# Patient Record
Sex: FEMALE | Race: WHITE | NOT HISPANIC OR LATINO | Employment: OTHER | URBAN - METROPOLITAN AREA
[De-identification: names, ages, dates, MRNs, and addresses within clinical notes are randomized per-mention and may not be internally consistent; named-entity substitution may affect disease eponyms.]

---

## 2017-03-13 ENCOUNTER — OFFICE VISIT (OUTPATIENT)
Dept: FAMILY MEDICINE | Facility: CLINIC | Age: 47
End: 2017-03-13
Payer: COMMERCIAL

## 2017-03-13 VITALS
SYSTOLIC BLOOD PRESSURE: 114 MMHG | HEIGHT: 66 IN | DIASTOLIC BLOOD PRESSURE: 68 MMHG | WEIGHT: 192.5 LBS | HEART RATE: 77 BPM | RESPIRATION RATE: 20 BRPM | BODY MASS INDEX: 30.94 KG/M2 | OXYGEN SATURATION: 98 % | TEMPERATURE: 98.5 F

## 2017-03-13 DIAGNOSIS — R10.13 ABDOMINAL PAIN, EPIGASTRIC: Primary | ICD-10-CM

## 2017-03-13 DIAGNOSIS — R14.2 FLATULENCE, ERUCTATION, AND GAS PAIN: ICD-10-CM

## 2017-03-13 DIAGNOSIS — R14.1 FLATULENCE, ERUCTATION, AND GAS PAIN: ICD-10-CM

## 2017-03-13 DIAGNOSIS — R14.3 FLATULENCE, ERUCTATION, AND GAS PAIN: ICD-10-CM

## 2017-03-13 LAB
BASOPHILS # BLD AUTO: 0 10E9/L (ref 0–0.2)
BASOPHILS NFR BLD AUTO: 0.3 %
DIFFERENTIAL METHOD BLD: NORMAL
EOSINOPHIL # BLD AUTO: 0.2 10E9/L (ref 0–0.7)
EOSINOPHIL NFR BLD AUTO: 2.3 %
ERYTHROCYTE [DISTWIDTH] IN BLOOD BY AUTOMATED COUNT: 12.8 % (ref 10–15)
HCT VFR BLD AUTO: 39.6 % (ref 35–47)
HGB BLD-MCNC: 13 G/DL (ref 11.7–15.7)
LYMPHOCYTES # BLD AUTO: 2.2 10E9/L (ref 0.8–5.3)
LYMPHOCYTES NFR BLD AUTO: 24.2 %
MCH RBC QN AUTO: 30.1 PG (ref 26.5–33)
MCHC RBC AUTO-ENTMCNC: 32.8 G/DL (ref 31.5–36.5)
MCV RBC AUTO: 92 FL (ref 78–100)
MONOCYTES # BLD AUTO: 0.9 10E9/L (ref 0–1.3)
MONOCYTES NFR BLD AUTO: 9.8 %
NEUTROPHILS # BLD AUTO: 5.7 10E9/L (ref 1.6–8.3)
NEUTROPHILS NFR BLD AUTO: 63.4 %
PLATELET # BLD AUTO: 270 10E9/L (ref 150–450)
RBC # BLD AUTO: 4.32 10E12/L (ref 3.8–5.2)
WBC # BLD AUTO: 9 10E9/L (ref 4–11)

## 2017-03-13 PROCEDURE — 85025 COMPLETE CBC W/AUTO DIFF WBC: CPT | Performed by: PHYSICIAN ASSISTANT

## 2017-03-13 PROCEDURE — 36415 COLL VENOUS BLD VENIPUNCTURE: CPT | Performed by: PHYSICIAN ASSISTANT

## 2017-03-13 PROCEDURE — 99213 OFFICE O/P EST LOW 20 MIN: CPT | Performed by: PHYSICIAN ASSISTANT

## 2017-03-13 PROCEDURE — 80053 COMPREHEN METABOLIC PANEL: CPT | Performed by: PHYSICIAN ASSISTANT

## 2017-03-13 RX ORDER — OMEPRAZOLE 40 MG/1
40 CAPSULE, DELAYED RELEASE ORAL DAILY
Qty: 30 CAPSULE | Refills: 1 | Status: SHIPPED | OUTPATIENT
Start: 2017-03-13 | End: 2017-11-08

## 2017-03-13 ASSESSMENT — ANXIETY QUESTIONNAIRES
7. FEELING AFRAID AS IF SOMETHING AWFUL MIGHT HAPPEN: SEVERAL DAYS
2. NOT BEING ABLE TO STOP OR CONTROL WORRYING: NEARLY EVERY DAY
3. WORRYING TOO MUCH ABOUT DIFFERENT THINGS: NEARLY EVERY DAY
GAD7 TOTAL SCORE: 8
6. BECOMING EASILY ANNOYED OR IRRITABLE: SEVERAL DAYS
5. BEING SO RESTLESS THAT IT IS HARD TO SIT STILL: NOT AT ALL
1. FEELING NERVOUS, ANXIOUS, OR ON EDGE: NOT AT ALL
IF YOU CHECKED OFF ANY PROBLEMS ON THIS QUESTIONNAIRE, HOW DIFFICULT HAVE THESE PROBLEMS MADE IT FOR YOU TO DO YOUR WORK, TAKE CARE OF THINGS AT HOME, OR GET ALONG WITH OTHER PEOPLE: SOMEWHAT DIFFICULT

## 2017-03-13 ASSESSMENT — PATIENT HEALTH QUESTIONNAIRE - PHQ9: 5. POOR APPETITE OR OVEREATING: NOT AT ALL

## 2017-03-13 NOTE — PATIENT INSTRUCTIONS
The 'BRAT' diet is suggested: Bread, rice, applesauce, toast, ie, bland carbohydrates.  Avoid high protein, high fat (ie, meat, cheese, dairy products) fried foods and spicy foods until you are able to tolerate carbohydrates.  Frequent, small amounts; don't overdo, go slowly! Progress diet as tolerated as symptoms alyssa.   Drink plenty of fluids!!!  Over the counter cold remedies that have zinc and Vitamin C may help you feel better sooner.    Recommend trial of a daily probiotic agent; some common options include: Align, Culturelle, Digestive Advantage, Florastor, Activia yogurt, or Kefir.  Choose one agent (or a comparable generic OTC formulation) that is affordable/palatable and use it daily for at least 3-6 months to determine its baseline effect.  Encouraged 20 billion units per day for probiotic dosage (5-10 billion units for children).  May use over the counter peptobismal if needed, don't be alarmed if it turns your stool black.    Trial of zantac (ranitidine), Prilosec (omeprazole), Nexium (esomeprazole) or other acid reflex type medicines encouraged as well.    Call if bloody stools, persistent diarrhea, vomiting, fever or abdominal pain.    Consider Gastroenterology referral for possible EGD (endoscopy) if no improvement with above or any worsening/changes in symptoms.

## 2017-03-13 NOTE — MR AVS SNAPSHOT
After Visit Summary   3/13/2017    Abi Mcnair    MRN: [id number]           Patient Information     Date Of Birth          1970        Visit Information        Provider Department      3/13/2017 2:40 PM Ree Guerrero PA-C [facility]        Today's Diagnoses     Abdominal pain, epigastric    -  1    Flatulence, eructation, and gas pain          Care Instructions    The 'BRAT' diet is suggested: Bread, rice, applesauce, toast, ie, bland carbohydrates.  Avoid high protein, high fat (ie, meat, cheese, dairy products) fried foods and spicy foods until you are able to tolerate carbohydrates.  Frequent, small amounts; don't overdo, go slowly! Progress diet as tolerated as symptoms alyssa.   Drink plenty of fluids!!!  Over the counter cold remedies that have zinc and Vitamin C may help you feel better sooner.    Recommend trial of a daily probiotic agent; some common options include: Align, Culturelle, Digestive Advantage, Florastor, Activia yogurt, or Kefir.  Choose one agent (or a comparable generic OTC formulation) that is affordable/palatable and use it daily for at least 3-6 months to determine its baseline effect.  Encouraged 20 billion units per day for probiotic dosage (5-10 billion units for children).  May use over the counter peptobismal if needed, don't be alarmed if it turns your stool black.    Trial of zantac (ranitidine), Prilosec (omeprazole), Nexium (esomeprazole) or other acid reflex type medicines encouraged as well.    Call if bloody stools, persistent diarrhea, vomiting, fever or abdominal pain.    Consider Gastroenterology referral for possible EGD (endoscopy) if no improvement with above or any worsening/changes in symptoms.          Follow-ups after your visit        Who to contact     If you have questions or need follow up information about today's clinic visit or your schedule please contact [facility] directly at  "[phone].  Normal or non-critical lab and imaging results will be communicated to you by MyChart, letter or phone within 4 business days after the clinic has received the results. If you do not hear from us within 7 days, please contact the clinic through [facility] or phone. If you have a critical or abnormal lab result, we will notify you by phone as soon as possible.  Submit refill requests through [facility] or call your pharmacy and they will forward the refill request to us. Please allow 3 business days for your refill to be completed.          Additional Information About Your Visit        [facility] Information     [facility] lets you send messages to your doctor, view your test results, renew your prescriptions, schedule appointments and more. To sign up, go to [email or web address] . Click on \"Log in\" on the left side of the screen, which will take you to the Welcome page. Then click on \"Sign up Now\" on the right side of the page.     You will be asked to enter the access code listed below, as well as some personal information. Please follow the directions to create your username and password.     Your access code is: [email or web address]  Expires: 2017  2:55 PM     Your access code will  in 90 days. If you need help or a new code, please call your [facility] clinic or [phone].        Care EveryWhere ID     This is your Care EveryWhere ID. This could be used by other organizations to access your [facility] medical records  [id number]        Your Vitals Were     Pulse Temperature Respirations Height Pulse Oximetry BMI (Body Mass Index)    77 98.5  F (36.9  C) (Oral) 20 5' 5.5\" (1.664 m) 98% 31.55 kg/m2       Blood Pressure from Last 3 Encounters:   17 114/68   10/26/15 120/70   03/01/15 121/82    Weight from Last 3 Encounters:   17 192 lb 8 oz (87.3 kg)   10/26/15 194 lb (88 kg)   03/01/15 185 lb (83.9 kg)              We Performed the Following     CBC with platelets differential     " Comprehensive metabolic panel     DEPRESSION ACTION PLAN (DAP)          Today's Medication Changes          These changes are accurate as of: 3/13/17  2:56 PM.  If you have any questions, ask your nurse or doctor.               Start taking these medicines.        Dose/Directions    omeprazole 40 MG capsule   Commonly known as:  priLOSEC   Used for:  Abdominal pain, epigastric, Flatulence, eructation, and gas pain   Started by:  Ree Guerrero PA-C        Dose:  40 mg   Take 1 capsule (40 mg) by mouth daily Take 30-60 minutes before a meal.   Quantity:  30 capsule   Refills:  1            Where to get your medicines      These medications were sent to [facility] [address] [place] [address] AT [address] [address]    Hours:  24-hours Phone:  [phone]     omeprazole 40 MG capsule                Primary Care Provider Office Phone # Fax #    Ree Guerrero PA-C [phone]       [address] [facility] [address]        Thank you!     Thank you for choosing [facility]  for your care. Our goal is always to provide you with excellent care. Hearing back from our patients is one way we can continue to improve our services. Please take a few minutes to complete the written survey that you may receive in the mail after your visit with us. Thank you!             Your Updated Medication List - Protect others around you: Learn how to safely use, store and throw away your medicines at www.disposemymeds.org.          This list is accurate as of: 3/13/17  2:56 PM.  Always use your most recent med list.                   Brand Name Dispense Instructions for use    ABILIFY PO      Reported on 3/13/2017       clonazePAM 1 MG tablet    klonoPIN    30 tablet    Take 0.5-1 tablets by mouth daily as needed for anxiety. Take only for severe panic attacks       omeprazole 40 MG capsule     priLOSEC    30 capsule    Take 1 capsule (40 mg) by mouth daily Take 30-60 minutes before a meal.       TRAZODONE HCL PO      Reported on 3/13/2017       venlafaxine 225 MG Tb24 24 hr tablet    EFFEXOR-ER     Reported on 3/13/2017

## 2017-03-13 NOTE — NURSING NOTE
"Chief Complaint   Patient presents with     Abdominal Pain       Initial /68 (BP Location: Left arm, Patient Position: Chair, Cuff Size: Adult Regular)  Pulse 77  Temp 98.5  F (36.9  C) (Oral)  Resp 20  Ht 5' 5.5\" (1.664 m)  Wt 192 lb 8 oz (87.3 kg)  SpO2 98%  BMI 31.55 kg/m2 Estimated body mass index is 31.55 kg/(m^2) as calculated from the following:    Height as of this encounter: 5' 5.5\" (1.664 m).    Weight as of this encounter: 192 lb 8 oz (87.3 kg).  Medication Reconciliation: complete     Mook Boss CMA  "

## 2017-03-13 NOTE — PROGRESS NOTES
"  SUBJECTIVE:                                                    Abi Mcnair is a 47 year old female who presents to clinic today for the following health issues:      Abdominal Pain      Duration: more noticable this past week     Description (location/character/radiation): top middle abdominal       Associated flank pain: None    Intensity:  moderate    Accompanying signs and symptoms:        Fever/Chills: no        Gas/Bloating: YES- bloating and burping feeling       Nausea/vomitting: YES- nausea       Diarrhea: no        Dysuria or Hematuria: no     History (previous similar pain/trauma/previous testing): none    Precipitating or alleviating factors:       Pain worse with eating/BM/urination: no       Pain relieved by BM: no     Therapies tried and outcome: antacid pill-helps a lilttle    LMP:  2 months ago      Felt like had a stomach flu with vomiting 2 months ago. Occurred again last month as well.  Now a few weeks ago, felt more full and gasey with stomach \"churning\" at night especially. Nausea noted but no vomiting.  Symptoms occurred a couple times a day for a few days.  Decreased appetite and gets full quickly.  Some tenderness and fullness in epigastric area.  No actually pain, but occasionally achey/crampy.    Regular bowel movements, daily, no diarrhea. NO blood in stools.  NO changes in diet, overall healthy.      Problem list and histories reviewed & adjusted, as indicated.  Additional history: as documented    Patient Active Problem List   Diagnosis     Insomnia     CARDIOVASCULAR SCREENING; LDL GOAL LESS THAN 160     Menorrhagia     Anxiety     Depression with anxiety     Past Surgical History   Procedure Laterality Date     Hc excis primary ganglion wrist       right ganglionic cyst removal-wrist       Social History   Substance Use Topics     Smoking status: Never Smoker     Smokeless tobacco: Never Used      Comment: tried it when she was younger     Alcohol use Yes      Comment: 0-5 " "per month     Family History   Problem Relation Age of Onset     Circulatory Mother      varicose veins     OSTEOPOROSIS Mother      osteopenia     Arthritis Father      Lipids Father      Depression Maternal Grandfather      Alcohol/Drug Paternal Grandfather      alcoholism     C.A.D. Other      paternal cousin, heart attack     DIABETES Paternal Uncle      Hypertension Paternal Uncle      Cancer - colorectal Paternal Aunt      Allergies Other      seasonal allergies, benadryl, mold, dust, self ,improves as she gets older     Lipids Sister      Neurologic Disorder Sister      seizures     Respiratory Maternal Uncle      chf, was on oxygen     Thyroid Disease Other      paternal cousin, thyroid cancer     CANCER No family hx of      skin cancer         Current Outpatient Prescriptions   Medication Sig Dispense Refill     omeprazole (PRILOSEC) 40 MG capsule Take 1 capsule (40 mg) by mouth daily Take 30-60 minutes before a meal. 30 capsule 1     Allergies   Allergen Reactions     Diphenhydramine Hcl      Throat started to close up       Reviewed and updated as needed this visit by clinical staff  Tobacco  Allergies  Med Hx  Surg Hx  Fam Hx  Soc Hx      Reviewed and updated as needed this visit by Provider         ROS:  Constitutional, HEENT, cardiovascular, pulmonary, gi and gu systems are negative, except as otherwise noted.    OBJECTIVE:                                                    /68 (BP Location: Left arm, Patient Position: Chair, Cuff Size: Adult Regular)  Pulse 77  Temp 98.5  F (36.9  C) (Oral)  Resp 20  Ht 5' 5.5\" (1.664 m)  Wt 192 lb 8 oz (87.3 kg)  SpO2 98%  BMI 31.55 kg/m2  Body mass index is 31.55 kg/(m^2).  GENERAL: healthy, alert and no distress  RESP: lungs clear to auscultation - no rales, rhonchi or wheezes  CV: regular rate and rhythm, normal S1 S2, no S3 or S4, no murmur, click or rub, no peripheral edema and peripheral pulses strong  ABDOMEN: soft, nontender, no hepatosplenomegaly, " no masses and bowel sounds normal  PSYCH: mentation appears normal, affect normal/bright    Diagnostic Test Results:  Pending at time of note     ASSESSMENT/PLAN:                                                        ICD-10-CM    1. Abdominal pain, epigastric R10.13 Comprehensive metabolic panel     CBC with platelets differential     omeprazole (PRILOSEC) 40 MG capsule   2. Flatulence, eructation, and gas pain R14.3 Comprehensive metabolic panel    R14.1 CBC with platelets differential    R14.2 omeprazole (PRILOSEC) 40 MG capsule       Patient Instructions   The 'BRAT' diet is suggested: Bread, rice, applesauce, toast, ie, bland carbohydrates.  Avoid high protein, high fat (ie, meat, cheese, dairy products) fried foods and spicy foods until you are able to tolerate carbohydrates.  Frequent, small amounts; don't overdo, go slowly! Progress diet as tolerated as symptoms alyssa.   Drink plenty of fluids!!!  Over the counter cold remedies that have zinc and Vitamin C may help you feel better sooner.    Recommend trial of a daily probiotic agent; some common options include: Align, Culturelle, Digestive Advantage, Florastor, Activia yogurt, or Kefir.  Choose one agent (or a comparable generic OTC formulation) that is affordable/palatable and use it daily for at least 3-6 months to determine its baseline effect.  Encouraged 20 billion units per day for probiotic dosage (5-10 billion units for children).  May use over the counter peptobismal if needed, don't be alarmed if it turns your stool black.    Trial of zantac (ranitidine), Prilosec (omeprazole), Nexium (esomeprazole) or other acid reflex type medicines encouraged as well.    Call if bloody stools, persistent diarrhea, vomiting, fever or abdominal pain.    Consider Gastroenterology referral for possible EGD (endoscopy) if no improvement with above or any worsening/changes in symptoms.    Ree Guerrero PA-C  [facility]

## 2017-03-13 NOTE — LETTER
My Depression Action Plan  Name: Abi Mcnair   Date of Birth 1970  Date: 3/13/2017    My doctor: Ree Guerrero   My clinic: [address] [address] [phone]          GREEN    ZONE   Good Control    What it looks like:     Things are going generally well. You have normal up s and down s. You may even feel depressed from time to time, but bad moods usually last less than a day.   What you need to do:  1. Continue to care for yourself (see self care plan)  2. Check your depression survival kit and update it as needed  3. Follow your physician s recommendations including any medication.  4. Do not stop taking medication unless you consult with your physician first.           YELLOW         ZONE Getting Worse    What it looks like:     Depression is starting to interfere with your life.     It may be hard to get out of bed; you may be starting to isolate yourself from others.    Symptoms of depression are starting to last most all day and this has happened for several days.     You may have suicidal thoughts but they are not constant.   What you need to do:     1. Call your care team, your response to treatment will improve if you keep your care team informed of your progress. Yellow periods are signs an adjustment may need to be made.     2. Continue your self-care, even if you have to fake it!    3. Talk to someone in your support network    4. Open up your depression survival kit           RED    ZONE Medical Alert - Get Help    What it looks like:     Depression is seriously interfering with your life.     You may experience these or other symptoms: You can t get out of bed most days, can t work or engage in other necessary activities, you have trouble taking care of basic hygiene, or basic responsibilities, thoughts of suicide or death that will not go away, self-injurious behavior.     What you need to do:  1. Call your  care team and request a same-day appointment. If they are not available (weekends or after hours) call your local crisis line, emergency room or 911.      Electronically signed by: Mook Boss, March 13, 2017    Depression Self Care Plan / Survival Kit    Self-Care for Depression  Here s the deal. Your body and mind are really not as separate as most people think.  What you do and think affects how you feel and how you feel influences what you do and think. This means if you do things that people who feel good do, it will help you feel better.  Sometimes this is all it takes.  There is also a place for medication and therapy depending on how severe your depression is, so be sure to consult with your medical provider and/ or Behavioral Health Consultant if your symptoms are worsening or not improving.     In order to better manage my stress, I will:    Exercise  Get some form of exercise, every day. This will help reduce pain and release endorphins, the  feel good  chemicals in your brain. This is almost as good as taking antidepressants!  This is not the same as joining a gym and then never going! (they count on that by the way ) It can be as simple as just going for a walk or doing some gardening, anything that will get you moving.      Hygiene   Maintain good hygiene (Get out of bed in the morning, Make your bed, Brush your teeth, Take a shower, and Get dressed like you were going to work, even if you are unemployed).  If your clothes don't fit try to get ones that do.    Diet  I will strive to eat foods that are good for me, drink plenty of water, and avoid excessive sugar, caffeine, alcohol, and other mood-altering substances.  Some foods that are helpful in depression are: complex carbohydrates, B vitamins, flaxseed, fish or fish oil, fresh fruits and vegetables.    Psychotherapy  I agree to participate in Individual Therapy (if recommended).    Medication  If prescribed medications, I agree to take them.   Missing doses can result in serious side effects.  I understand that drinking alcohol, or other illicit drug use, may cause potential side effects.  I will not stop my medication abruptly without first discussing it with my provider.    Staying Connected With Others  I will stay in touch with my friends, family members, and my primary care provider/team.    Use your imagination  Be creative.  We all have a creative side; it doesn t matter if it s oil painting, sand castles, or mud pies! This will also kick up the endorphins.    Witness Beauty  (AKA stop and smell the roses) Take a look outside, even in mid-winter. Notice colors, textures. Watch the squirrels and birds.     Service to others  Be of service to others.  There is always someone else in need.  By helping others we can  get out of ourselves  and remember the really important things.  This also provides opportunities for practicing all the other parts of the program.    Humor  Laugh and be silly!  Adjust your TV habits for less news and crime-drama and more comedy.    Control your stress  Try breathing deep, massage therapy, biofeedback, and meditation. Find time to relax each day.     My support system    Clinic Contact:  Phone number:    Contact 1:  Phone number:    Contact 2:  Phone number:    Anabaptism/:  Phone number:    Therapist:  Phone number:    Local crisis center:    Phone number:    Other community support:  Phone number:

## 2017-03-14 LAB
ALBUMIN SERPL-MCNC: 3.8 G/DL (ref 3.4–5)
ALP SERPL-CCNC: 109 U/L (ref 40–150)
ALT SERPL W P-5'-P-CCNC: 67 U/L (ref 0–50)
ANION GAP SERPL CALCULATED.3IONS-SCNC: 9 MMOL/L (ref 3–14)
AST SERPL W P-5'-P-CCNC: ABNORMAL U/L (ref 0–45)
BILIRUB SERPL-MCNC: 0.5 MG/DL (ref 0.2–1.3)
BUN SERPL-MCNC: 17 MG/DL (ref 7–30)
CALCIUM SERPL-MCNC: 9 MG/DL (ref 8.5–10.1)
CHLORIDE SERPL-SCNC: 107 MMOL/L (ref 94–109)
CO2 SERPL-SCNC: 21 MMOL/L (ref 20–32)
CREAT SERPL-MCNC: 0.72 MG/DL (ref 0.52–1.04)
GFR SERPL CREATININE-BSD FRML MDRD: 86 ML/MIN/1.7M2
GLUCOSE SERPL-MCNC: 95 MG/DL (ref 70–99)
POTASSIUM SERPL-SCNC: ABNORMAL MMOL/L (ref 3.4–5.3)
PROT SERPL-MCNC: 7.8 G/DL (ref 6.8–8.8)
SODIUM SERPL-SCNC: 137 MMOL/L (ref 133–144)

## 2017-03-14 ASSESSMENT — PATIENT HEALTH QUESTIONNAIRE - PHQ9: SUM OF ALL RESPONSES TO PHQ QUESTIONS 1-9: 0

## 2017-03-14 ASSESSMENT — ANXIETY QUESTIONNAIRES: GAD7 TOTAL SCORE: 8

## 2017-03-14 NOTE — PROGRESS NOTES
"Please send patient letter with the following:  This letter is sent to inform you of recent test results and to provide you with personal records of health information.    Your labs are overall within normal limits.  There is still an elevated liver enzyme (ALT).  This can be from dehydration, increased NSAIDS (ibuprofen, advil, aleve type products) or alcohol use. Please monitor the intake of these things and we can repeat these levels in 3-6 months. If they remain elevated you may need a ultrasound of your liver.     Thank you for allowing me to participate in your care. If you have any further questions or problems, please contact me at [phone].    Ree Figueroa \"Gopal\" BLANCA Guerrero"

## 2017-11-08 ENCOUNTER — HOSPITAL ENCOUNTER (INPATIENT)
Facility: CLINIC | Age: 47
LOS: 8 days | Discharge: HOME | DRG: 885 | End: 2017-11-16
Attending: EMERGENCY MEDICINE | Admitting: PSYCHIATRY & NEUROLOGY
Payer: COMMERCIAL

## 2017-11-08 DIAGNOSIS — R44.3 HALLUCINATIONS: ICD-10-CM

## 2017-11-08 DIAGNOSIS — R45.851 SUICIDAL THOUGHTS: ICD-10-CM

## 2017-11-08 DIAGNOSIS — F41.8 DEPRESSION WITH ANXIETY: Primary | ICD-10-CM

## 2017-11-08 PROBLEM — F29 PSYCHOSIS (H): Status: ACTIVE | Noted: 2017-11-08

## 2017-11-08 LAB
AMPHETAMINES UR QL SCN: NEGATIVE
BARBITURATES UR QL: NEGATIVE
BENZODIAZ UR QL: NEGATIVE
CANNABINOIDS UR QL SCN: NEGATIVE
COCAINE UR QL: NEGATIVE
OPIATES UR QL SCN: NEGATIVE
PCP UR QL SCN: NEGATIVE

## 2017-11-08 PROCEDURE — 12400006 ZZH R&B MH INTERMEDIATE

## 2017-11-08 PROCEDURE — 90791 PSYCH DIAGNOSTIC EVALUATION: CPT

## 2017-11-08 PROCEDURE — 80307 DRUG TEST PRSMV CHEM ANLYZR: CPT | Performed by: EMERGENCY MEDICINE

## 2017-11-08 PROCEDURE — 99285 EMERGENCY DEPT VISIT HI MDM: CPT | Mod: 25

## 2017-11-08 RX ORDER — OLANZAPINE 5 MG/1
5-10 TABLET, ORALLY DISINTEGRATING ORAL EVERY 6 HOURS PRN
Status: DISCONTINUED | OUTPATIENT
Start: 2017-11-08 | End: 2017-11-16 | Stop reason: HOSPADM

## 2017-11-08 RX ORDER — HYDROXYZINE HYDROCHLORIDE 25 MG/1
25-50 TABLET, FILM COATED ORAL EVERY 4 HOURS PRN
Status: DISCONTINUED | OUTPATIENT
Start: 2017-11-08 | End: 2017-11-08

## 2017-11-08 RX ORDER — CLONAZEPAM 0.5 MG/1
0.5 TABLET ORAL 3 TIMES DAILY
Status: DISCONTINUED | OUTPATIENT
Start: 2017-11-08 | End: 2017-11-16 | Stop reason: HOSPADM

## 2017-11-08 RX ORDER — OLANZAPINE 5 MG/1
5 TABLET ORAL AT BEDTIME
Status: DISCONTINUED | OUTPATIENT
Start: 2017-11-08 | End: 2017-11-09

## 2017-11-08 NOTE — IP AVS SNAPSHOT
[place] [address] [place] [address]    Phone:  [phone]                                       After Visit Summary   11/8/2017    Abi Mcnair    MRN: [id number]           After Visit Summary Signature Page     I have received my discharge instructions, and my questions have been answered. I have discussed any challenges I see with this plan with the nurse or doctor.    ..........................................................................................................................................  Patient/Patient Representative Signature      ..........................................................................................................................................  Patient Representative Print Name and Relationship to Patient    ..................................................               ................................................  Date                                            Time    ..........................................................................................................................................  Reviewed by Signature/Title    ...................................................              ..............................................  Date                                                            Time

## 2017-11-08 NOTE — IP AVS SNAPSHOT
MRN:[id number]                      After Visit Summary   11/8/2017    Abi Mcnair    MRN: [id number]           Thank you!     Thank you for choosing [facility] for your care. Our goal is always to provide you with excellent care.        Patient Information     Date Of Birth          1970        Designated Caregiver       Most Recent Value    Caregiver    Will someone help with your care after discharge? yes    Name of designated caregiver mom    Phone number of caregiver see FRANCISCO JAVIER    Caregiver address ask pt.      About your hospital stay     You were admitted on:  November 8, 2017 You last received care in the:  [facility]    You were discharged on:  November 16, 2017       Who to Call     For medical emergencies, please call 911.  For non-urgent questions about your medical care, please call your primary care provider or clinic, [phone]          Attending Provider     Provider Specialty    Tabitha Sanchez MD Emergency Medicine    [facility]       Primary Care Provider Office Phone # Fax #    Ree Guerrero PA-C [phone]      Further instructions from your care team       Behavioral Discharge Planning and Instructions    Summary: Admitted to hospital due to hearing voices plotting against her.    Main Diagnosis: Major depressive disorder;Anxiety NOS; eating disorder, inactive     Major Treatments, Procedures and Findings: psychiatric assessment; medication adjustment    Symptoms to Report: feeling more aggressive, increased confusion or mood getting worse    Lifestyle Adjustment: Follow up with therapy and medication management as recommended.    Psychiatry Follow-up:     Appointment with Smita Mchugh, medication management, on Thursday 11/30/17 @ 11:40 am.  DBT intake with Jamison Rinaldi on Wednesday 11/22/17 @ 2 pm- 2 hour appointment- at [facility] office@[address] in  [place], [place]  "[address] . Phone # [phone]  [facility]                                                                                            [facility] [facility] [id number]  [place]  [address]  Phone:  [phone]  Fax:  [phone]    Resources:   [facility] Service- [phone]  Crisis Intervention: 214.134.5139 or 118-059-9873 (TTY: [phone]).  Call anytime for help.  National Gates on Mental Illness (www.mn.bandar.org): 966.437.7537 or 019-475-2251.  National Suicide Prevention Line (www.mentalhealthmn.org): 904-259-BVDF (9311)    General Medication Instructions:   See your medication sheet(s) for instructions.   Take all medicines as directed.  Make no changes unless your doctor suggests them.   Go to all your doctor visits.  Be sure to have all your required lab tests. This way, your medicines can be refilled on time.  Do not use any drugs not prescribed by your doctor.  Avoid alcohol.      Pending Results     No orders found from 11/6/2017 to 11/9/2017.            Statement of Approval     Ordered          11/16/17 1404  I have reviewed and agree with all the recommendations and orders detailed in this document.  EFFECTIVE NOW     Approved and electronically signed by:  Mauri Fleming MD             Admission Information     Date & Time Department Dept. Phone    11/8/2017 [facility] [phone]      Your Vitals Were     Blood Pressure Pulse Temperature Respirations Height Weight    113/83 126 98  F (36.7  C) (Oral) 16 1.676 m (5' 5.98\") 85.9 kg (189 lb 4.8 oz)    Pulse Oximetry BMI (Body Mass Index)                98% 30.57 kg/m2          MyChart Information     [employer] gives you secure access to your electronic health record. If you see a primary care provider, you can also send messages to your care team and make appointments. If you have questions, please call your primary care clinic.  If you do not have a primary care provider, please call " [phone] and they will assist you.        Care EveryWhere ID     This is your Care EveryWhere ID. This could be used by other organizations to access your [place] medical records  [id number]        Equal Access to Services     ALEX PATEL AH: Hadii aad ku hadbrandinmelo Bharti, waaxda luqadaha, qaybta kaalmada adebhaskarda, angélica lancelaverne morrisonloreto miller karly ricardo. So [facility] [phone].    ATENCIÓN: Si habla español, tiene a rodrigues disposición servicios gratuitos de asistencia lingüística. Llame al [phone].    We comply with applicable federal civil rights laws and [place] laws. We do not discriminate on the basis of race, color, national origin, age, disability, sex, sexual orientation, or gender identity.               Review of your medicines      START taking        Dose / Directions    ARIPiprazole 10 MG tablet   Commonly known as:  ABILIFY   Used for:  Depression with anxiety        Dose:  10 mg   Start taking on:  11/17/2017   Take 1 tablet (10 mg) by mouth daily   Quantity:  30 tablet   Refills:  0       * QUEtiapine 400 MG tablet   Commonly known as:  SEROquel   Used for:  Depression with anxiety        Dose:  400 mg   Take 1 tablet (400 mg) by mouth At Bedtime   Quantity:  30 tablet   Refills:  0       * QUEtiapine 100 MG tablet   Commonly known as:  SEROquel   Used for:  Depression with anxiety        Dose:  100 mg   Take 1 tablet (100 mg) by mouth every 2 hours as needed (Anxiety, depression)   Quantity:  60 tablet   Refills:  0       * Notice:  This list has 2 medication(s) that are the same as other medications prescribed for you. Read the directions carefully, and ask your doctor or other care provider to review them with you.      CONTINUE these medicines which may have CHANGED, or have new prescriptions. If we are uncertain of the size of tablets/capsules you have at home, strength may be listed as something that might have changed.        Dose / Directions    clonazePAM 0.5 MG tablet   Commonly  known as:  klonoPIN   This may have changed:    - medication strength  - how much to take   Used for:  Depression with anxiety        Dose:  0.5 mg   Take 1 tablet (0.5 mg) by mouth 3 times daily   Quantity:  90 tablet   Refills:  0       OLANZapine zydis 10 MG ODT tab   Commonly known as:  zyPREXA   This may have changed:    - medication strength  - how much to take  - when to take this  - reasons to take this   Used for:  Depression with anxiety        Dose:  10 mg   Take 1 tablet (10 mg) by mouth every 6 hours as needed for agitation   Quantity:  60 tablet   Refills:  0       venlafaxine 150 MG 24 hr capsule   Commonly known as:  EFFEXOR-XR   This may have changed:    - medication strength  - how much to take  - additional instructions   Used for:  Depression with anxiety        Dose:  150 mg   Start taking on:  11/17/2017   Take 1 capsule (150 mg) by mouth every morning   Quantity:  30 capsule   Refills:  0         CONTINUE these medicines which have NOT CHANGED        Dose / Directions    HYDROXYZINE HCL PO        Dose:  25 mg   Take 25 mg by mouth 2 times daily as needed (sleep, anxiety)   Refills:  0       VITAMIN D (CHOLECALCIFEROL) PO        Dose:  1000 Units   Take 1,000 Units by mouth daily   Refills:  0            Where to get your medicines      These medications were sent to [facility] [address] [place] [address] AT [address] [address]    Hours:  24-hours Phone:  [phone]     ARIPiprazole 10 MG tablet    OLANZapine zydis 10 MG ODT tab    QUEtiapine 100 MG tablet    QUEtiapine 400 MG tablet    venlafaxine 150 MG 24 hr capsule         Some of these will need a paper prescription and others can be bought over the counter. Ask your nurse if you have questions.     You don't need a prescription for these medications     clonazePAM 0.5 MG tablet                Protect others around you: Learn how to safely use, store and throw away your medicines  at www.disposemymeds.org.             Medication List: This is a list of all your medications and when to take them. Check marks below indicate your daily home schedule. Keep this list as a reference.      Medications           Morning Afternoon Evening Bedtime As Needed    ARIPiprazole 10 MG tablet   Commonly known as:  ABILIFY   Take 1 tablet (10 mg) by mouth daily   Start taking on:  11/17/2017   Last time this was given:  10 mg on 11/16/2017  7:48 AM                                   clonazePAM 0.5 MG tablet   Commonly known as:  klonoPIN   Take 1 tablet (0.5 mg) by mouth 3 times daily   Last time this was given:  0.5 mg on 11/16/2017  7:48 AM                                         HYDROXYZINE HCL PO   Take 25 mg by mouth 2 times daily as needed (sleep, anxiety)                                   OLANZapine zydis 10 MG ODT tab   Commonly known as:  zyPREXA   Take 1 tablet (10 mg) by mouth every 6 hours as needed for agitation   Last time this was given:  10 mg on 11/16/2017 11:08 AM                                   * QUEtiapine 400 MG tablet   Commonly known as:  SEROquel   Take 1 tablet (400 mg) by mouth At Bedtime   Last time this was given:  400 mg on 11/15/2017  9:53 PM                                   * QUEtiapine 100 MG tablet   Commonly known as:  SEROquel   Take 1 tablet (100 mg) by mouth every 2 hours as needed (Anxiety, depression)   Last time this was given:  400 mg on 11/15/2017  9:53 PM                                   venlafaxine 150 MG 24 hr capsule   Commonly known as:  EFFEXOR-XR   Take 1 capsule (150 mg) by mouth every morning   Start taking on:  11/17/2017   Last time this was given:  150 mg on 11/16/2017  7:48 AM                                   VITAMIN D (CHOLECALCIFEROL) PO   Take 1,000 Units by mouth daily   Last time this was given:  1,000 Units on 11/16/2017  7:48 AM                                   * Notice:  This list has 2 medication(s) that are the same as other medications  prescribed for you. Read the directions carefully, and ask your doctor or other care provider to review them with you.

## 2017-11-09 LAB
ANION GAP SERPL CALCULATED.3IONS-SCNC: 7 MMOL/L (ref 3–14)
BUN SERPL-MCNC: 14 MG/DL (ref 7–30)
CALCIUM SERPL-MCNC: 9.1 MG/DL (ref 8.5–10.1)
CHLORIDE SERPL-SCNC: 107 MMOL/L (ref 94–109)
CO2 SERPL-SCNC: 28 MMOL/L (ref 20–32)
CREAT SERPL-MCNC: 0.7 MG/DL (ref 0.52–1.04)
ERYTHROCYTE [DISTWIDTH] IN BLOOD BY AUTOMATED COUNT: 13 % (ref 10–15)
GFR SERPL CREATININE-BSD FRML MDRD: 89 ML/MIN/1.7M2
GLUCOSE SERPL-MCNC: 136 MG/DL (ref 70–99)
HCT VFR BLD AUTO: 42.7 % (ref 35–47)
HGB BLD-MCNC: 14.5 G/DL (ref 11.7–15.7)
MCH RBC QN AUTO: 30.7 PG (ref 26.5–33)
MCHC RBC AUTO-ENTMCNC: 34 G/DL (ref 31.5–36.5)
MCV RBC AUTO: 90 FL (ref 78–100)
PLATELET # BLD AUTO: 294 10E9/L (ref 150–450)
POTASSIUM SERPL-SCNC: 3.8 MMOL/L (ref 3.4–5.3)
RBC # BLD AUTO: 4.73 10E12/L (ref 3.8–5.2)
SODIUM SERPL-SCNC: 142 MMOL/L (ref 133–144)
TSH SERPL DL<=0.005 MIU/L-ACNC: 1.37 MU/L (ref 0.4–4)
WBC # BLD AUTO: 5.9 10E9/L (ref 4–11)

## 2017-11-09 PROCEDURE — 99207 ZZC CDG-MDM COMPONENT: MEETS LOW - DOWN CODED: CPT | Performed by: NURSE PRACTITIONER

## 2017-11-09 PROCEDURE — 84443 ASSAY THYROID STIM HORMONE: CPT | Performed by: PSYCHIATRY & NEUROLOGY

## 2017-11-09 PROCEDURE — 85027 COMPLETE CBC AUTOMATED: CPT | Performed by: PSYCHIATRY & NEUROLOGY

## 2017-11-09 PROCEDURE — 99222 1ST HOSP IP/OBS MODERATE 55: CPT | Performed by: NURSE PRACTITIONER

## 2017-11-09 PROCEDURE — 80048 BASIC METABOLIC PNL TOTAL CA: CPT | Performed by: PSYCHIATRY & NEUROLOGY

## 2017-11-09 PROCEDURE — 25000132 ZZH RX MED GY IP 250 OP 250 PS 637: Performed by: PSYCHIATRY & NEUROLOGY

## 2017-11-09 PROCEDURE — 90853 GROUP PSYCHOTHERAPY: CPT

## 2017-11-09 PROCEDURE — 12400006 ZZH R&B MH INTERMEDIATE

## 2017-11-09 PROCEDURE — 36415 COLL VENOUS BLD VENIPUNCTURE: CPT | Performed by: PSYCHIATRY & NEUROLOGY

## 2017-11-09 PROCEDURE — 97150 GROUP THERAPEUTIC PROCEDURES: CPT | Mod: GO

## 2017-11-09 RX ORDER — QUETIAPINE FUMARATE 25 MG/1
25 TABLET, FILM COATED ORAL
Status: DISCONTINUED | OUTPATIENT
Start: 2017-11-09 | End: 2017-11-10

## 2017-11-09 RX ORDER — ARIPIPRAZOLE 5 MG/1
5 TABLET ORAL DAILY
Status: DISCONTINUED | OUTPATIENT
Start: 2017-11-09 | End: 2017-11-14

## 2017-11-09 RX ADMIN — VITAMIN D, TAB 1000IU (100/BT) 1000 UNITS: 25 TAB at 08:21

## 2017-11-09 RX ADMIN — CLONAZEPAM 0.5 MG: 0.5 TABLET ORAL at 08:21

## 2017-11-09 RX ADMIN — VENLAFAXINE HYDROCHLORIDE 225 MG: 75 CAPSULE, EXTENDED RELEASE ORAL at 12:06

## 2017-11-09 RX ADMIN — OLANZAPINE 5 MG: 5 TABLET, FILM COATED ORAL at 00:16

## 2017-11-09 RX ADMIN — ARIPIPRAZOLE 5 MG: 5 TABLET ORAL at 13:55

## 2017-11-09 RX ADMIN — QUETIAPINE FUMARATE 25 MG: 25 TABLET, FILM COATED ORAL at 20:12

## 2017-11-09 RX ADMIN — QUETIAPINE FUMARATE 25 MG: 25 TABLET, FILM COATED ORAL at 16:46

## 2017-11-09 RX ADMIN — CLONAZEPAM 0.5 MG: 0.5 TABLET ORAL at 00:16

## 2017-11-09 RX ADMIN — OLANZAPINE 5 MG: 5 TABLET, ORALLY DISINTEGRATING ORAL at 06:15

## 2017-11-09 RX ADMIN — CLONAZEPAM 0.5 MG: 0.5 TABLET ORAL at 20:11

## 2017-11-09 ASSESSMENT — ACTIVITIES OF DAILY LIVING (ADL)
DRESS: STREET CLOTHES
ORAL_HYGIENE: INDEPENDENT
GROOMING: INDEPENDENT
GROOMING: INDEPENDENT
ORAL_HYGIENE: INDEPENDENT
DRESS: INDEPENDENT

## 2017-11-09 ASSESSMENT — ENCOUNTER SYMPTOMS
DYSPHORIC MOOD: 1
HYPERACTIVE: 1
SLEEP DISTURBANCE: 1
HALLUCINATIONS: 1
NERVOUS/ANXIOUS: 1

## 2017-11-09 NOTE — H&P
[facility]    History and Physical  Hospitalist       Date of Admission:  11/8/2017    Assessment & Plan   Abi Mcnair is a 47 year old female who presented to [facility] Emergency Department for evaluation and treatment of progressively worsening auditory hallucinations, increasing paranoia, and thoughts of self-harm. She was admitted to Adult Mental Health for further evaluation. She is currently in ITC and has been medicated for continued auditory hallucinations.    Auditory hallucinations  Self-harm ideation  Ms. Mcnair presented to [facility] on 11/6/2017 for evaluation of above symptoms and then [facility] ED on 11/8/2017 for same. She was admitted to inpatient Adult Mental Health yesterday for worsening of auditory hallucinations and increasing paranoia. She has a history of cutting and had strong urges to cut, but did not. Urine drug screen was negative.  - full plan of care per Dr. Fleming    Personal history of bulimia   She endorses history of bulimia, however feels this is not an issue at this time.   - will follow electrolytes     DVT Prophylaxis: Ambulate every shift  Code Status: Full Code    Disposition: Per Dr. Fleming    The above assessment and plan has been discussed with Dr. Leung, who is in agreement with the above assessment and plan.     A CBC, BMP, and T4 have been ordered for this morning. I will review the results and sign-off if they are within expected limits. Please call us back if you have additional questions or concerns.    NESSA Levin, CNP  Hospitalist Service, House Officer  [facility]     Text Page  Pager: [phone]    Primary Care Physician   No current PCP    Chief Complaint   Auditory hallucinations, paranoia, thoughts of self-harm    History is obtained from the patient    History of Present Illness   Abi Mcnair is a 47 year old female who presented to [facility] Emergency Department for evaluation and treatment of progressively  worsening auditory hallucinations, increasing paranoia, and thoughts of self-injurious behavior. She relates the hallucinations and paranoia to recent medication changes. A urine drug screen was negative. She was admitted to Adult Mental Health for further evaluation. She is currently in [facility] and has been medicated for continued auditory hallucinations.    Past Medical History    I personally reviewed history with patient:  - depression, treated  - anxiety, treated   - self-harm, cutting  - bulimia  - schizotypal personality disorder with magical thinking     Past Surgical History   I personally reviewed history with patient:  - cyst removal right wrist  - wisdom teeth extraction    Prior to Admission Medications   Prior to Admission Medications   Prescriptions Last Dose Informant Patient Reported? Taking?   CLONAZEPAM PO 11/8/2017 at 0.5mg-AM  Yes Yes   Sig: Take 1 mg by mouth 3 times daily    HYDROXYZINE HCL PO 11/8/2017 at am  Yes Yes   Sig: Take 25 mg by mouth 2 times daily as needed (sleep, anxiety)    OLANZAPINE PO 11/7/2017 at 2.5mg  Yes Yes   Sig: Take 5 mg by mouth At Bedtime    VENLAFAXINE HCL ER PO 11/8/2017 at am  Yes Yes   Sig: Take 225 mg by mouth every morning 150mg capsule + 75mg capsule for total 225mg   VITAMIN D, CHOLECALCIFEROL, PO 11/8/2017 at am  Yes Yes   Sig: Take 1,000 Units by mouth daily      Facility-Administered Medications: None     Allergies   Allergies   Allergen Reactions     Diphenhydramine Hcl      Throat started to close up       Social History   I personally reviewed history with patient:  - lives locally, lives alone  - is currently employed  - does not currently smoke  - rarely uses alcohol  - denies other drugs    Family History   I personally reviewed history with patient:  - denies significant family history    Review of Systems   The 10 point Review of Systems is negative other than noted in the HPI or here.     Physical Exam   Temp: 97.8  F (36.6  C) Temp src: Oral BP: (!)  134/92   Heart Rate: 93 Resp: 18 SpO2: 98 % O2 Device: None (Room air)    Vital Signs with Ranges  Temp:  [97.1  F (36.2  C)-97.8  F (36.6  C)] 97.8  F (36.6  C)  Heart Rate:  [] 93  Resp:  [18] 18  BP: (134-137)/(82-92) 134/92  SpO2:  [98 %] 98 %  184 lbs 0 oz    General: Appears stated age, no acute distress.  Skin:  Warm, dry. No rashes or lesions on exposed skin.  HEENT:  Normocephalic, atraumatic.  Chest:  Bilateral anterior and posterior lung fields clear to auscultation. No increased work of breathing. Does not require supplemental oxygen.  Cardiovascular:  Regular rate and rhythm, without murmur, rub, or gallop. Bilateral upper and lower distal pulses palpable.   Abdomen:  Soft, non-tender, non-distended. Bowel sounds present.   Musculoskeletal:  Moves all four extremities.   Neurological:  Alert and oriented x 4. Cranial nerves II-XII grossly intact.   Psychiatric:  Pleasant and cooperative. Answers all questions asked. No obvious hallucinations.    Data   Data reviewed today:  I personally reviewed no images or EKG's today.  No lab results found in last 7 days.    Imaging:  No results found for this or any previous visit (from the past 24 hour(s)).

## 2017-11-09 NOTE — PLAN OF CARE
Problem: General Plan of Care (Inpatient Behavioral)  Goal: Discharge Planning  Patient attended Process Group and participated appropriately. Patient demonstrated good insight into the topic of discussion and was able to identify appropriate coping skills relevant to the group discussion.

## 2017-11-09 NOTE — ED PROVIDER NOTES
"  History     Chief Complaint:  Psychiatric Evaluation    HPI   Abi Mcnair is a 47 year old female with a history of depression who presents to the emergency department today for evaluation of psychiatric evaluation. The patient reports the voices in her head have progressively worsened and are now plotting against her with increased paranoia and fear she may hurt herself by going too far in cutting herself with a knife. The patient reports she has a \"strong urge\" to hurt herself to stop the voices in her head, but she is not suicidal and she came in for fear that she would kill herself. The patient has high anxiety and reports the voices in her head are also getting louder and the chatter will not stop, although she is unable to distinguish what the voices are saying. The patient went to [facility] [place] who prescribed her 2.5 mg of Zyprexa with no relief in auditory hallucinations and only made her sleepy. The patient reports Zyprexa has worsened the voices and recently when she went to get her medications filled, she believed the people at the pharmacy could hear the voices and the people started saying mean things about her. The patient's appetite is normal, but she has been having \"crazy dreams\" and has not been sleeping well for the past week. The patient has seen a therapist only 3 times in the past, but stopped going because she did not \"connect\" with them. The patient also had a lapse of medication (225 mg of Effexor, 0.5 mg of Klonopin twice daily, 25-50 mg of Vistaril daily, and 2.5 mg of Zyprexa), from January to June of 2017 due to believing she was okay and due to insurance. On June she was placed on a new medication that did not work so she was put back on 225 mg of Effexor and 25-50 mg of Vistaril daily as needed. Had zyprexa increased today. Denies ETOH or drug use.  The patient reports history of self-harm, but not in the last 10-12 years.    Allergies:  Diphenhydramine Hcl    Medications:  " "  Medications reviewed. No current medications.     Past Medical History:    Allergic rhinitis   Anxiety   Depression with anxiety   Schizotypal personality disorder with magical thinking  Insomnia    Past Surgical History:    Surgical history reviewed. No pertinent surgical history.     Family History:    Mother: Varicose veins, Osteoporosis  Father: Arthritis, Lipids  Paternal Grandfather: Alcoholism   Maternal Grandfather: Depression  Sister: Lipids, Seizures     Social History:  The patient was accompanied to the ED by mother and sister.  Smoking Status: Never Smoker  Smokeless Tobacco: Never Used  Alcohol Use: Positive  Marital Status:  Single [1]     Review of Systems   Psychiatric/Behavioral: Positive for dysphoric mood, hallucinations (auditory), self-injury (thoughts of self harm), sleep disturbance and suicidal ideas (with plan to cut herself with knife). The patient is nervous/anxious and is hyperactive.    All other systems reviewed and are negative.    Physical Exam     Patient Vitals for the past 24 hrs:   BP Temp Temp src Heart Rate Resp SpO2 Height Weight   11/08/17 2306 (!) 134/92 97.8  F (36.6  C) Oral 93 18 - 1.676 m (5' 5.98\") 83.5 kg (184 lb)   11/08/17 2044 137/82 97.1  F (36.2  C) Temporal 103 18 98 % 1.676 m (5' 6\") 81.6 kg (180 lb)     Physical Exam  General: Sitting up in bed  Eyes:  The pupils are equal and round  ENT:    Moist mucous membranes  Neck:  Normal range of motion  CV:  Tachycardic rate and rhythm    Skin warm and well perfused   Resp:  Lungs are clear    Non-labored    No rales    No wheezing   MS:  Normal muscular tone  Skin:  No rash or acute skin lesions noted  Neuro:   Awake, alert.      Speech is normal and fluent.    Face is symmetric.     Moves all extremities  Psych: Good eye contact, Pressured speech, laughing at inappropriate times.  Appropriate interactions.      Emergency Department Course     Laboratory:  Laboratory findings were communicated with the family who " voiced understanding of the findings.    Drug abuse screen 77 urine: Negative     Emergency Department Course:    2044 Nursing notes and vitals reviewed.    2140 I spoke with DEC.    2148 I performed an exam of the patient as documented above.     2214 I discussed the treatment plan with the patient. They expressed understanding of this plan and consented to admission. I personally reviewed the examination results with the family and answered all related questions prior to admission.    2240 I spoke with DEC.    2245 Dr. Fleming will admit the patient to a monitored bed for further evaluation and treatment.    Impression & Plan      Medical Decision Making:  Abi Mcnair is a 47 year old female who presents to the emergency department today for psychiatric evaluation. Vital signs normal. She appears hypomanic, laughing at inappropriate times with pressured speech. Endorsing hallucinations and intense thoughts of wanting to cut herself to relieve these voices. I discussed patient with DEC and DEC evaluated the patient. We both agree that the patient requires psychiatric stabilization. There is a bed available here in the psychiatric unit and will be admitted to Dr. Fleming.Doubt medical cause as the cause of her symptoms today. The patient is agreeable with this plan.     Diagnosis:    ICD-10-CM    1. Hallucinations R44.3 Drug abuse screen urine   2. Suicidal thoughts R45.851      Disposition:   The patient is admitted into the care of Dr. Fleming.    Scribe Disclosure:  I, Vincenzo Deluca, am serving as a scribe at 9:48 PM on 11/8/2017 to document services personally performed by Tabitha Sanchez MD based on my observations and the provider's statements to me.     EMERGENCY DEPARTMENT       Tabitha Sanchez MD  11/09/17 0235

## 2017-11-09 NOTE — PROGRESS NOTES
"Pt came to staff at 0550 stating that her voices were loud and continuous.  She said she could now understand what they were saying.  The voices were saying \"you are not good enough.\"  And she said they were getting \"progressively more evil.\"  Zydis 5mg was given and it gave pt some relief.   "

## 2017-11-09 NOTE — H&P
"[facility] Psychiatric H&P Note       Initial History     The patient's care was discussed with the treatment team and chart notes were reviewed.     Patient examined for psychiatric admission.     IDENTIFICATION    Abi Mcnair is a 47 year old female with a history of depression, anxiety, and an eating disorder who presented to [facility] ED for evaluation and treatment of progressively worsening auditory hallucinations, paranoia, and thoughts of self-harm. Pt sees PCP Dr. Guerrero, Ree Krueger. Pt's psychiatrist is Dr. Smita Mchugh at [facility].     HISTORY OF PRESENT ILLNESS    Pt has been depressed since she was ~16 years old, however she states her current depression feels different that it has in the past. Pt has been hearing whispering voices for the last several weeks. On admission she was not sure what the voices were saying but she knew \"they are evil.\" She felt like cutting or killing herself to escape the stress. Pt reports now she can hear what what the voices are saying, and they are asking her \"why are you still alive? You are a loser. You should have killed yourself when you had the chance. I can't believe you are still here... I am stronger than you, you won't be able to get any help.\" She reports she does not feel depressed currently, her main complaint is the voices. Pt states she did go to [facility] [place] for the above complaints and she states she was given olanzapine and discharged from the ED there. She states the medication makes her \"foggy\" and tired. She states that is has not helped with the voices. She had an episode like this ~12 years ago. She went on a medication that caused the voices to stay away, but she does not recall what it was. At some point she went on Effexor for depression, but it caused the voices to come back. She reports she is an anxious person, a worrier. Pt endorses consistent, pervasive sense of anxiety and worry. Pt finds this anxiety difficult to " control, often resulting in restlessness, feeling on edge, irritability, and sleep disturbance. Discussed her past use of Klonopin. Dr. Fleming discussed the immediate negative effects of benzodiazapine use including increased fall risk and lowered IQ. Discussed addiction risk. Also mentioned the long-term detrimental effects including increased risk of dementia. Pt verbalized understanding. Plan to begin Abilify 5mg qd, begin Seroquel 25mg [id number] PRN, and discontinue Zyprexa. She reports her family is supportive and they are aware of her hospitalization. Asked her to sign a release for her mother and sister so that we may call them for collateral history, she agreed.     Mother was called and confirmed the history of the present illness. She added that the anxiety started when the patient was in middle school. She also stated that the pt has a good support system with family and is very transparent with her mental health diagnoses with the family. Mother also added that the Pt had been off of her medications for about 8 weeks this summer but has been doing well since being back on them. She also confirms that there is no chemical dependency issues with Pt.     CHEMICAL DEPENDENCY HISTORY    Pt denies the use of illicit substances and tobacco. She admits to using alcohol once or twice a month.     PAST  PSYCHIATRIC HISTORY    Pt has used Abilify in the past but she does not recall if it helped. She has also been taking Klonopin for some time, she claims she was not aware that it was addictive. She is currently on Effexor ER 225mg qAM, she reports no side effects but endorses symptoms of discontinuation syndrome if she misses a dose. She has a history of an eating disorder but she is not currently practicing. She used to diet and exercise excessively, she does not recall why she stopped.     FAMILY HISTORY    Pt's family history is positive for depression in her in her maternal grandfather, brother and sister.  "There is a history of alcohol dependence in her paternal grandfather and her father.     SOCIAL HISTORY    Pt grew up in [place] and is the youngest of four children. She was raised by both parents and they were supportive. 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 She also works at [employer] as an , and does some \"alternative healing.\"       Hospital Course     On 11/9 started Abilify 5mg qd, started Seroquel 25mg [id number] PRN, and discontinued Zyprexa. Planned to call her mother and sister for collateral history.      Medications     Prescriptions Prior to Admission   Medication Sig Dispense Refill Last Dose     OLANZAPINE PO Take 5 mg by mouth At Bedtime    11/7/2017 at 2.5mg     HYDROXYZINE HCL PO Take 25 mg by mouth 2 times daily as needed (sleep, anxiety)    11/8/2017 at am     CLONAZEPAM PO Take 1 mg by mouth 3 times daily    11/8/2017 at 0.5mg-AM     VENLAFAXINE HCL ER PO Take 225 mg by mouth every morning 150mg capsule + 75mg capsule for total 225mg   11/8/2017 at am     VITAMIN D, CHOLECALCIFEROL, PO Take 1,000 Units by mouth daily   11/8/2017 at am       Scheduled Medications:    cholecalciferol  1,000 Units Oral Daily     venlafaxine  225 mg Oral QAM     OLANZapine (zyPREXA) tablet 5 mg  5 mg Oral At Bedtime     clonazePAM  0.5 mg Oral TID     PRNs:  OLANZapine zydis      Allergies      Allergies   Allergen Reactions     Diphenhydramine Hcl      Throat started to close up        Previous Medical History     Past Medical History:   Diagnosis Date     Allergic rhinitis      Anxiety      CARDIOVASCULAR SCREENING; LDL GOAL LESS THAN 160 5/9/2010     Depression with anxiety      Insomnia 5/8/2009        Medical Review of Systems     BP (!) 148/98  Temp 98  F (36.7  C) (Oral)  Resp 16  Ht 1.676 m (5' 5.98\")  Wt 83.5 kg (184 lb)  SpO2 98%  BMI 29.71 kg/m2  Body mass index is 29.71 " kg/(m^2).    Previous 10-point ROS completed by Tabitha Sanchez MD on 11/8/2017 reviewed by Mauri Fleming MD on November 9, 2017 and is unchanged except for those problems mentioned within the HPI.     Mental Status Examination     Appearance Sitting in chair, dressed in scrubs. Appears stated age.   Attitude Cooperative   Orientation Oriented to person, place, time   Eye Contact Poor   Speech Regular rate, rhythm, volume and tone   Language Normal   Psychomotor Behavior Normal   Mood Anxious   Affect Fair range   Thought Process Goal-Oriented, Intact   Associations Intact   Thought Content Patient is currently negative for suicide ideation, negative for plan or intent, able to contract no self harm and identify barriers to suicide. Positive for obsessions, compulsions or psychosis.      Fund of Knowledge Appropriate   Insight Fair   Judgement Impaired   Attention Span & Concentration Alert   Recent & Remote Memory Intact   Gait Normal   Muscle Tone Intact      Labs     Labs reviewed.  Recent Results (from the past 24 hour(s))   Drug abuse screen urine    Collection Time: 11/08/17 10:35 PM   Result Value Ref Range    Amphetamine Qual Urine Negative NEG^Negative    Barbiturates Qual Urine Negative NEG^Negative    Benzodiazepine Qual Urine Negative NEG^Negative    Cannabinoids Qual Urine Negative NEG^Negative    Cocaine Qual Urine Negative NEG^Negative    Opiates Qualitative Urine Negative NEG^Negative    PCP Qual Urine Negative NEG^Negative   CBC with platelets    Collection Time: 11/09/17  9:13 AM   Result Value Ref Range    WBC 5.9 4.0 - 11.0 10e9/L    RBC Count 4.73 3.8 - 5.2 10e12/L    Hemoglobin 14.5 11.7 - 15.7 g/dL    Hematocrit 42.7 35.0 - 47.0 %    MCV 90 78 - 100 fl    MCH 30.7 26.5 - 33.0 pg    MCHC 34.0 31.5 - 36.5 g/dL    RDW 13.0 10.0 - 15.0 %    Platelet Count 294 150 - 450 10e9/L   Basic metabolic panel    Collection Time: 11/09/17  9:13 AM   Result Value Ref Range    Sodium 142 133 - 144 mmol/L     Potassium 3.8 3.4 - 5.3 mmol/L    Chloride 107 94 - 109 mmol/L    Carbon Dioxide 28 20 - 32 mmol/L    Anion Gap 7 3 - 14 mmol/L    Glucose 136 (H) 70 - 99 mg/dL    Urea Nitrogen 14 7 - 30 mg/dL    Creatinine 0.70 0.52 - 1.04 mg/dL    GFR Estimate 89 >60 mL/min/1.7m2    GFR Estimate If Black >90 >60 mL/min/1.7m2    Calcium 9.1 8.5 - 10.1 mg/dL   TSH with free T4 reflex and/or T3 as indicated    Collection Time: 11/09/17  9:13 AM   Result Value Ref Range    TSH 1.37 0.40 - 4.00 mU/L          Impression     Abi Mcnair is a 47 year old female with a history of depression, anxiety, and an eating disorder who presented to [facility] ED for evaluation and treatment of progressively worsening auditory hallucinations, paranoia, and thoughts of self-harm. Pt has a long history of depression and anxiety, and has a currently inactive eating disorder. She is typically experiencing what appears to be a psychotic depression. She states she does not feel depressed but is hearing voices that are criticizing her and suggesting she kill herself. Plan to begin Abilify 5mg qd, Seroquel 25mg [id number] PRN, and discontinue Zyprexa. Will call her family for collateral history.      Diagnoses     1. Major depressive disorder, recurrent, severe with psychotic features  2. Anxiety, NOS   3. Eating disorder, inactive     Plan     1. Explained side effects, benefits, and complications of medications to the patient, Pt gave verbal consent.  2. Medication changes: begin Abilify 5mg qd, begin Seroquel 25mg [id number] PRN, discontinue Zyprexa  3. Discussed treatment plan with patient and team.  4. Projected length of stay: when Pt is stable  5. Pt to sign a FRANCISCO JAVIER for her mother Kirk so that we may call her (home: [phone], cell: [phone])  6. Pt to sign a FRANCISCO JAVIER for her sister Renee at [phone]  7. Discussed [employer]        Attestation:   Patient has been seen and evaluated by me, Mauri Fleming MD.    Patient ID:  Name: Abi  Gaby Mcnair    MRN: [id number]  Admission: 11/8/2017     YOB: 1970

## 2017-11-09 NOTE — PHARMACY-ADMISSION MEDICATION HISTORY
Admission medication history interview status for the 11/8/2017  admission is complete. See EPIC admission navigator for prior to admission medications     Medication history source reliability:Good- patient & Rx bottles    Actions taken by pharmacist (provider contacted, etc):None     Additional medication history information not noted on PTA med list :  -Multiple medication changes at appointment today prior to presentation:  -clonazepam increased 0.5mg BID to 1mg TID  -Stop hydroxyzine  -Olanzapine increased from 2.5mg hs to 5mg hs    Medication reconciliation/reorder completed by provider prior to medication history? No    Time spent in this activity: 15 min    Prior to Admission medications    Medication Sig Last Dose Taking? Auth Provider   OLANZAPINE PO Take 5 mg by mouth At Bedtime  11/7/2017 at 2.5mg Yes Reported, Patient   HYDROXYZINE HCL PO Take 25 mg by mouth 2 times daily as needed (sleep, anxiety)  11/8/2017 at am Yes Reported, Patient   CLONAZEPAM PO Take 1 mg by mouth 3 times daily  11/8/2017 at 0.5mg-AM Yes Reported, Patient   VENLAFAXINE HCL ER PO Take 225 mg by mouth every morning 150mg capsule + 75mg capsule for total 225mg 11/8/2017 at am Yes Unknown, Entered By History   VITAMIN D, CHOLECALCIFEROL, PO Take 1,000 Units by mouth daily 11/8/2017 at am Yes Unknown, Entered By History

## 2017-11-09 NOTE — PROGRESS NOTES
11/08/17 3951   Patient Belongings   Did you bring any home meds/supplements to the hospital?  No   Patient Belongings other (see comments)   Disposition of Belongings Other (see comment)   Belongings Search Yes   Clothing Search Yes   Second Staff Lenin Agrawal with laces  Pair of socks  Sweatshirt no strings  t-shirt  Jeans  cellphone        Admission:  I am responsible for any personal items that are not sent to the safe or pharmacy. [facility] is not responsible for loss, theft or damage of any property in my possession.        Patient Signature: ___________________________________________      Date/Time:__________________________     Staff Signature: __________________________________      Date/Time:__________________________     [facility] Staff person, if patient is unable/unwilling to sign:      __________________________________________________________      Date/Time: __________________________        Discharge:  [facility] has returned all of my personal belongings:     Patient Signature: ________________________________________     Date/Time: ____________________________________     Staff Signature: ______________________________________     Date/Time:_____________________________________

## 2017-11-09 NOTE — PROGRESS NOTES
"A 47 year old female came to  with auditory hallucinations, anxiety, paranoia and thoughts of SIB.  Pt said this started Monday after she had recently changed her medications.  She said the voices are chattering and she cannot understand what they are saying. Her paranoia is thinking people are talking about her.  Hx: this pt's 1st  admit. She has a hx of depression, anxiety, SIB and bulimia. Pt hasn't been sleeping well and she said her dreams have been \"crazy\".  During the admission pt was quite pleasant. She has a great support system and she wants help.    Welcome packet reviewed with patient. Information reviewed includes getting emergency help, preventing infections, understanding your care, using medication safely, reducing falls, preventing pressure ulcers, smoking cessation, powerful choices and Patients Bill of Rights. Pt. given tour of the unit and instruction on use of facility including emergency call light. Program schedule reviewed with patient. Questions regarding the unit addressed. Pt. Search completed and belongings inventoried.  Nursing assessment complete including patient and medication profiles. Risk assessments completed addressing suicide,fall,skin,nutrition and safety issues. Care plan initiated. Assessments reviewed with physician and admit orders received.     "

## 2017-11-09 NOTE — PLAN OF CARE
Problem: Psychotic Symptoms  Goal: Psychotic Symptoms  Signs and symptoms of listed problems will be absent or manageable.     1. Mood stability  2. Medication regiment established/compliance  3. Decreased paranoia  4. Increased insight  5. Increased boundaries  6. Psychotic s/sx resolved  7. Positive coping skills established/utilized  8. Adequate sleep  9. Housing/community support  10. F/u plan in place   Outcome: No Change  Pt has been isolative and bed resting in the ITC throughout the shift, but will come out at random times for certain requests, meals or phone calls; pt was on the phone for about 45 minutes this morning and ate most of her meals. Pt is respectful to peers and staff. No groups, but is interested. Pt presents a flat affect, but brightens upon approach and communication.

## 2017-11-10 PROCEDURE — 90853 GROUP PSYCHOTHERAPY: CPT

## 2017-11-10 PROCEDURE — 97150 GROUP THERAPEUTIC PROCEDURES: CPT | Mod: GO

## 2017-11-10 PROCEDURE — 12400006 ZZH R&B MH INTERMEDIATE

## 2017-11-10 PROCEDURE — 25000132 ZZH RX MED GY IP 250 OP 250 PS 637: Performed by: PSYCHIATRY & NEUROLOGY

## 2017-11-10 RX ORDER — ACETAMINOPHEN 325 MG/1
650 TABLET ORAL EVERY 4 HOURS PRN
Status: DISCONTINUED | OUTPATIENT
Start: 2017-11-10 | End: 2017-11-16 | Stop reason: HOSPADM

## 2017-11-10 RX ORDER — QUETIAPINE FUMARATE 50 MG/1
50 TABLET, FILM COATED ORAL
Status: DISCONTINUED | OUTPATIENT
Start: 2017-11-10 | End: 2017-11-13

## 2017-11-10 RX ORDER — QUETIAPINE FUMARATE 100 MG/1
100 TABLET, FILM COATED ORAL AT BEDTIME
Status: DISCONTINUED | OUTPATIENT
Start: 2017-11-10 | End: 2017-11-12

## 2017-11-10 RX ADMIN — ARIPIPRAZOLE 5 MG: 5 TABLET ORAL at 08:51

## 2017-11-10 RX ADMIN — ACETAMINOPHEN 650 MG: 325 TABLET, FILM COATED ORAL at 04:28

## 2017-11-10 RX ADMIN — CLONAZEPAM 0.5 MG: 0.5 TABLET ORAL at 16:06

## 2017-11-10 RX ADMIN — ACETAMINOPHEN 650 MG: 325 TABLET, FILM COATED ORAL at 08:52

## 2017-11-10 RX ADMIN — QUETIAPINE FUMARATE 100 MG: 100 TABLET ORAL at 21:09

## 2017-11-10 RX ADMIN — CLONAZEPAM 0.5 MG: 0.5 TABLET ORAL at 21:09

## 2017-11-10 RX ADMIN — VENLAFAXINE HYDROCHLORIDE 225 MG: 75 CAPSULE, EXTENDED RELEASE ORAL at 08:52

## 2017-11-10 RX ADMIN — OLANZAPINE 10 MG: 5 TABLET, ORALLY DISINTEGRATING ORAL at 04:18

## 2017-11-10 RX ADMIN — CLONAZEPAM 0.5 MG: 0.5 TABLET ORAL at 08:52

## 2017-11-10 RX ADMIN — QUETIAPINE FUMARATE 25 MG: 25 TABLET, FILM COATED ORAL at 04:15

## 2017-11-10 RX ADMIN — QUETIAPINE FUMARATE 50 MG: 50 TABLET, FILM COATED ORAL at 14:05

## 2017-11-10 RX ADMIN — QUETIAPINE FUMARATE 25 MG: 25 TABLET, FILM COATED ORAL at 06:18

## 2017-11-10 RX ADMIN — QUETIAPINE FUMARATE 50 MG: 50 TABLET, FILM COATED ORAL at 09:55

## 2017-11-10 RX ADMIN — VITAMIN D, TAB 1000IU (100/BT) 1000 UNITS: 25 TAB at 08:52

## 2017-11-10 RX ADMIN — QUETIAPINE FUMARATE 50 MG: 50 TABLET, FILM COATED ORAL at 12:11

## 2017-11-10 ASSESSMENT — ACTIVITIES OF DAILY LIVING (ADL)
DRESS: STREET CLOTHES
ORAL_HYGIENE: INDEPENDENT
GROOMING: INDEPENDENT

## 2017-11-10 NOTE — PLAN OF CARE
Problem: Psychotic Symptoms  Goal: Psychotic Symptoms  Signs and symptoms of listed problems will be absent or manageable.     1. Mood stability  2. Medication regiment established/compliance  3. Decreased paranoia  4. Increased insight  5. Increased boundaries  6. Psychotic s/sx resolved  7. Positive coping skills established/utilized  8. Adequate sleep  9. Housing/community support  10. F/u plan in place   Patient pleasant and cooperative . States her voices ate getting slightly worse.Pt took Seroquel at 0955 and at 1200 pt was reassessed and states her voices are getting worse and she now feels like she has an entity attached to her. Seroquel 50 mg given again at 1211. Pt eating well and going to groups. Will check in around 1300 to see if she is better

## 2017-11-10 NOTE — PLAN OF CARE
Problem: Psychotic Symptoms  Goal: Psychotic Symptoms  Signs and symptoms of listed problems will be absent or manageable.     1. Mood stability  2. Medication regiment established/compliance  3. Decreased paranoia  4. Increased insight  5. Increased boundaries  6. Psychotic s/sx resolved  7. Positive coping skills established/utilized  8. Adequate sleep  9. Housing/community support  10. F/u plan in place   Outcome: No Change  Pt transferred to SDU an is adapting well. Pt inquired about her meds and was given a copy of current med list which RN reviewed with pt; received prn Seroquel for anxiety. Pt was visited this evening by family. Pt states the voices are still present, and the activity on the unit is a good distraction rather than being alone too much.

## 2017-11-10 NOTE — PROGRESS NOTES
Pt has been up since 0400 complaining of anxiety and voices in her head. Writer administered Seroquel 25mg PRN twice as well as Zyprexa 10mg once PRN. Pt was still complaining of weird feelings inside her head and Writer advised her to discuss with MD this morning. Pt verbalized understanding and is sitting in the hallways, able to focus in reading a book. Will continue to monitor Pt's status and document.

## 2017-11-10 NOTE — PROGRESS NOTES
"[facility] Psychiatric Progress Note       Interim History     The patient's care was discussed with the treatment team and chart notes were reviewed. Pt seen on SDU. Tolerating medications without side effects. Side effects, risks, and benefits of medications reviewed with patient. Patient is currently negative for suicide ideation, negative for plan or intent, able to contract no self harm and identify barriers to suicide. Pt is not improving. She remains psychotic and is still hearing voices. Pt is having problems sleeping. She woke up at 4am and was hearing voices. She described vivid dream where \"the voices told me that I was not welcome here, so I went as saw my animals and they welcomed me, but then death came and swooped in and I got in a fight with him. He destroyed everything and killed all my helpers and friends and said 'I won.'\" Increased Seroquel to 100mg qhs. Increased Seroquel to 50mg [id number] PRN.      Hospital Course     On 11/9 started Abilify 5mg qd, started Seroquel 25mg [id number] PRN, and discontinued Zyprexa. Planned to call her mother and sister for collateral history. On 11/10 Pt remained psychotic. She was not sleeping well, so increased Seroquel to 100mg qhs and 50mg [id number] PRN.      Medications     Current Facility-Administered Medications Ordered in Epic   Medication Dose Route Frequency Last Rate Last Dose     acetaminophen (TYLENOL) tablet 650 mg  650 mg Oral Q4H PRN   650 mg at 11/10/17 0852     ARIPiprazole (ABILIFY) tablet 5 mg  5 mg Oral Daily   5 mg at 11/10/17 0851     QUEtiapine (SEROquel) tablet 25 mg  25 mg Oral Q2H PRN   25 mg at 11/10/17 0618     cholecalciferol (vitamin D3) tablet 1,000 Units  1,000 Units Oral Daily   1,000 Units at 11/10/17 0852     venlafaxine (EFFEXOR-XR) 24 hr capsule 225 mg  225 mg Oral QAM   225 mg at 11/10/17 0852     clonazePAM (klonoPIN) tablet 0.5 mg  0.5 mg Oral TID   0.5 mg at 11/10/17 0852     OLANZapine zydis (zyPREXA) ODT tab " "5-10 mg  5-10 mg Oral Q6H PRN   10 mg at 11/10/17 0418     No current Epic-ordered outpatient prescriptions on file.         Allergies      Allergies   Allergen Reactions     Diphenhydramine Hcl      Throat started to close up        Medical Review of Systems     /84  Pulse 81  Temp 98.5  F (36.9  C) (Oral)  Resp 16  Ht 1.676 m (5' 5.98\")  Wt 83.5 kg (184 lb)  SpO2 98%  BMI 29.71 kg/m2  Body mass index is 29.71 kg/(m^2).  A 10-point review of systems was performed by Mauri Fleming MD and is negative, no new findings.      Psychiatric Examination     Appearance Sitting in chair, dressed in casual clothes. Appears stated age.   Attitude Cooperative   Orientation Oriented to person, place, time   Eye Contact Poor   Speech Pressured   Language Normal   Psychomotor Behavior Normal   Mood Anxious, elevated, agitated   Affect Limited range   Thought Process Goal-Oriented, Intact   Associations Intact   Thought Content Patient is currently negative for suicide ideation, negative for plan or intent, able to contract no self harm and identify barriers to suicide. Positive for obsessions, compulsions or psychosis.      Fund of Knowledge Average   Insight Poor   Judgement Impaired   Attention Span & Concentration Impaired   Recent & Remote Memory Intact   Gait Normal   Muscle Tone Intact        Labs     Labs reviewed.  No results found for this or any previous visit (from the past 24 hour(s)).     Impression     Abi Mcnair is a 47 year old female with a history of depression, anxiety, and an eating disorder who presented to [facility] ED for evaluation and treatment of progressively worsening auditory hallucinations, paranoia, and thoughts of self-harm. Pt has a long history of depression and anxiety, and has a currently inactive eating disorder. She remains psychotic and is sleeping poorly. Increase Seroquel to 100mg qhs and 50mg [id number] PRN.      Diagnoses     1. Major depressive disorder, recurrent, " severe   2. Anxiety, NOS   3. Eating disorder, inactive      Plan     1. Explained side effects, benefits, and complications of medications to the patient, Pt gave verbal consent.  2. Medication changes: begin Seroquel 100mg qhs, increase PRN Seroquel to 50mg [id number]   3. Discussed treatment plan with patient and team.  4. Projected length of stay: until Pt is stabilized      Attestation:   Patient has been seen and evaluated by me, Mauri Fleming MD.    Patient ID:  Name: Abi Mcnair    MRN: [id number]  Admission: 11/8/2017     YOB: 1970

## 2017-11-10 NOTE — PROGRESS NOTES
[facility] HEALTH SERVICES  SPIRITUAL ASSESSMENT Progress Note  [facility]    PRIMARY FOCUS:     Symptom/pain management    Emotional/spiritual/Jain distress    Support for coping    ILLNESS CIRCUMSTANCES:   Reviewed documentation. Reflective conversation shared with pt which integrated elements of illness and personal narratives.     Context of Serious Illness/Symptom(s) - Pt has a history of depression and anxiety and has recently been hearing negative voices.    Resources for Support - Pt has strong Denominational jaz.    DISTRESS:     Emotional/Existential/Relational Distress - Pt has been hearing voices that are strongly negative. Pt feels like she needs to fight against and argue with these voices and pt feels tired from fighting.    Spiritual/Latter-day Distress - Pt used to pray by envisioning Yo and talking with him. Just last night pt tried to talk to Yo and Yo told her that she wasn't welcome here and then he shut a gate in her face. Pt stated that the voices she hears have been transforming her spiritual guides and turning them against her. Pt used to pray, but felt discouraged when God didn't give her what she was asking for. Due to this discouragement, she now rarely prays for herself but frequently prays for others.     Social/Cultural/Economic Distress - N/A    SPIRITUAL/Latter day COPING:     Advent/Jaz - Denominational jaz.    Spiritual Practice(s) - Prayer. Pt also practices shamanistic prayer, and considers Yo her shamanistic guide. Pt also practices Reiki.     Emotional/Existential/Relational Connections - Not discussed.    GOALS OF CARE:    Goals of Care -  talked with pt about [facility] and Rinku Davis, both of whom spoke about fighting oppressive spirits or voices. Ultimately, both of them discovered they could not by their own strength fight the spirits, but instead turned to God for help and protection. SH said that the hope would be that the hospital staff can address what is  causing the voices. In the meantime, SH talked about submitting the voices to God and letting God and Oy fight them for her. SH suggested that Yo could be her protector, filtering out all the negative voices. SH suggested that any voice that is not grounded in God's love for her is not God's voice and is not a voice worth listening to. SH led pt through a centering prayer and mindfulness meditation that focused on grounding herself in God's love and letting any unwanted thoughts or voices float past like a cloud, present but having little impact.    Meaning/Sense-Making - Pt hopes to practice letting Yo guard her from the negative voices and messages.    PLAN: Pt appreciates ongoing  support. SH will follow next week as available or over the weekend upon request.      Bertha Escalante  Chaplain Resident

## 2017-11-10 NOTE — PLAN OF CARE
Problem: General Rehab Plan of Care  Goal: Occupational Therapy Goals  The patient and/or their representative will achieve their patient-specific goals related to the plan of care.  The patient-specific goals include:  INITIAL O.T. ASSESSMENT   Details:  Pt participated in OT groups both yesterday and today. Affect was bright during interactions. In Life Skills group, pt was alert and attentive, engaged in the group conversation without difficulty. Comments were appropriate to topic and demonstrated good tracking. Behavior was appropriate to context. In Exercise group, pt engaged in the group activity well, following directions 100% of the time. Behavior was organized. Pt focused easily in the 30 minute, structured group.

## 2017-11-11 PROCEDURE — 25000132 ZZH RX MED GY IP 250 OP 250 PS 637: Performed by: PSYCHIATRY & NEUROLOGY

## 2017-11-11 PROCEDURE — 12400006 ZZH R&B MH INTERMEDIATE

## 2017-11-11 RX ORDER — SENNOSIDES 8.6 MG
8.6 TABLET ORAL 2 TIMES DAILY PRN
Status: DISCONTINUED | OUTPATIENT
Start: 2017-11-11 | End: 2017-11-16 | Stop reason: HOSPADM

## 2017-11-11 RX ADMIN — QUETIAPINE FUMARATE 50 MG: 50 TABLET, FILM COATED ORAL at 12:21

## 2017-11-11 RX ADMIN — QUETIAPINE FUMARATE 50 MG: 50 TABLET, FILM COATED ORAL at 19:48

## 2017-11-11 RX ADMIN — ACETAMINOPHEN 650 MG: 325 TABLET, FILM COATED ORAL at 07:23

## 2017-11-11 RX ADMIN — VITAMIN D, TAB 1000IU (100/BT) 1000 UNITS: 25 TAB at 07:23

## 2017-11-11 RX ADMIN — OLANZAPINE 5 MG: 5 TABLET, ORALLY DISINTEGRATING ORAL at 20:10

## 2017-11-11 RX ADMIN — ARIPIPRAZOLE 5 MG: 5 TABLET ORAL at 07:23

## 2017-11-11 RX ADMIN — QUETIAPINE FUMARATE 50 MG: 50 TABLET, FILM COATED ORAL at 08:52

## 2017-11-11 RX ADMIN — QUETIAPINE FUMARATE 100 MG: 100 TABLET ORAL at 20:52

## 2017-11-11 RX ADMIN — CLONAZEPAM 0.5 MG: 0.5 TABLET ORAL at 16:05

## 2017-11-11 RX ADMIN — VENLAFAXINE HYDROCHLORIDE 225 MG: 75 CAPSULE, EXTENDED RELEASE ORAL at 07:23

## 2017-11-11 RX ADMIN — QUETIAPINE FUMARATE 50 MG: 50 TABLET, FILM COATED ORAL at 14:18

## 2017-11-11 RX ADMIN — OLANZAPINE 10 MG: 5 TABLET, ORALLY DISINTEGRATING ORAL at 07:23

## 2017-11-11 RX ADMIN — CLONAZEPAM 0.5 MG: 0.5 TABLET ORAL at 07:23

## 2017-11-11 RX ADMIN — CLONAZEPAM 0.5 MG: 0.5 TABLET ORAL at 20:52

## 2017-11-11 RX ADMIN — SENNOSIDES 8.6 MG: 8.6 TABLET, FILM COATED ORAL at 22:04

## 2017-11-11 RX ADMIN — QUETIAPINE FUMARATE 50 MG: 50 TABLET, FILM COATED ORAL at 16:42

## 2017-11-11 RX ADMIN — OLANZAPINE 5 MG: 5 TABLET, ORALLY DISINTEGRATING ORAL at 22:04

## 2017-11-11 NOTE — PROGRESS NOTES
Patient complains of loud auditory hallucinations. This was slightly relieved by PRNS. Subsequently, she spent a large part of the shift bed resting. Had a visit from her mother. Seemed to brighten in mood as the shift progressed.

## 2017-11-11 NOTE — PLAN OF CARE
Problem: Psychotic Symptoms  Goal: Psychotic Symptoms  Signs and symptoms of listed problems will be absent or manageable.     1. Mood stability  2. Medication regiment established/compliance  3. Decreased paranoia  4. Increased insight  5. Increased boundaries  6. Psychotic s/sx resolved  7. Positive coping skills established/utilized  8. Adequate sleep  9. Housing/community support  10. F/u plan in place   Pt has worsening negative auditory hallucinations. Pt had a good talk with . She fears that her voices are trying to separate her from her [facility]. Social and bright. Family visited today and they are very supportive of her.

## 2017-11-12 PROCEDURE — 25000132 ZZH RX MED GY IP 250 OP 250 PS 637: Performed by: PSYCHIATRY & NEUROLOGY

## 2017-11-12 PROCEDURE — 12400006 ZZH R&B MH INTERMEDIATE

## 2017-11-12 RX ORDER — QUETIAPINE FUMARATE 200 MG/1
200 TABLET, FILM COATED ORAL AT BEDTIME
Status: DISCONTINUED | OUTPATIENT
Start: 2017-11-12 | End: 2017-11-13

## 2017-11-12 RX ORDER — VENLAFAXINE HYDROCHLORIDE 150 MG/1
150 CAPSULE, EXTENDED RELEASE ORAL EVERY MORNING
Status: DISCONTINUED | OUTPATIENT
Start: 2017-11-13 | End: 2017-11-16 | Stop reason: HOSPADM

## 2017-11-12 RX ADMIN — SENNOSIDES 8.6 MG: 8.6 TABLET, FILM COATED ORAL at 08:03

## 2017-11-12 RX ADMIN — QUETIAPINE FUMARATE 200 MG: 200 TABLET, FILM COATED ORAL at 21:17

## 2017-11-12 RX ADMIN — CLONAZEPAM 0.5 MG: 0.5 TABLET ORAL at 08:03

## 2017-11-12 RX ADMIN — VITAMIN D, TAB 1000IU (100/BT) 1000 UNITS: 25 TAB at 08:03

## 2017-11-12 RX ADMIN — QUETIAPINE FUMARATE 50 MG: 50 TABLET, FILM COATED ORAL at 10:28

## 2017-11-12 RX ADMIN — OLANZAPINE 10 MG: 5 TABLET, ORALLY DISINTEGRATING ORAL at 06:40

## 2017-11-12 RX ADMIN — QUETIAPINE FUMARATE 50 MG: 50 TABLET, FILM COATED ORAL at 08:03

## 2017-11-12 RX ADMIN — QUETIAPINE FUMARATE 50 MG: 50 TABLET, FILM COATED ORAL at 16:44

## 2017-11-12 RX ADMIN — ARIPIPRAZOLE 5 MG: 5 TABLET ORAL at 08:03

## 2017-11-12 RX ADMIN — OLANZAPINE 10 MG: 5 TABLET, ORALLY DISINTEGRATING ORAL at 14:44

## 2017-11-12 RX ADMIN — CLONAZEPAM 0.5 MG: 0.5 TABLET ORAL at 16:13

## 2017-11-12 RX ADMIN — VENLAFAXINE HYDROCHLORIDE 225 MG: 75 CAPSULE, EXTENDED RELEASE ORAL at 08:03

## 2017-11-12 RX ADMIN — CLONAZEPAM 0.5 MG: 0.5 TABLET ORAL at 21:17

## 2017-11-12 RX ADMIN — SENNOSIDES 8.6 MG: 8.6 TABLET, FILM COATED ORAL at 21:29

## 2017-11-12 NOTE — PROGRESS NOTES
"Pt woke up at 0640 and she came to the nurses station, looking scared.  She said her voices are causing \"fear\" in her.  Pt said she can see a duplicate of herself, \"all powerful and mean\", controlling multiple \"gollum looking\" beings, cowering below her.  Pt was given Zydis 10 mg.  Staff offered to talk with pt but she wanted to go back to sleep. Will continue to monitor.   "

## 2017-11-12 NOTE — PLAN OF CARE
Problem: Psychotic Symptoms  Goal: Social and Therapeutic (Psychotic Symptoms)  Signs and symptoms of listed problems will be absent or manageable.   Outcome: No Change  Patient was more isolative and withdrawn this shift. States that her depression is more prevalent today and feels more withdrawn. Reports that the voices are loud and negative. Seroquel helps to lower the volume. Feels that from her perspective, her condition is not improving. Realizes this takes time and is hopeful for the future.

## 2017-11-12 NOTE — PLAN OF CARE
"Problem: Psychotic Symptoms  Goal: Psychotic Symptoms  Signs and symptoms of listed problems will be absent or manageable.     1. Mood stability  2. Medication regiment established/compliance  3. Decreased paranoia  4. Increased insight  5. Increased boundaries  6. Psychotic s/sx resolved  7. Positive coping skills established/utilized  8. Adequate sleep  9. Housing/community support  10. F/u plan in place   Outcome: No Change  Pt came to staff and asked for a PRN for her anxiety.  Seroquel 50 mg was given with no relief.  Zydis 5mg was given and pt had slight relief. She said her anxiety was moderate to severe because the voices \"have worsened\".  She said they are negative and angry, saying \"we are here to make you crazy\" \"you will not reach your goals\" \"we are going to draw all the good out of you\".  Pt said she talks to them and they answer all of her questions with a negative response.   Pt said the positives about being here is that she is sleeping better and that she needed a break from her overly busy life.  Pt spent most of her evening with her visitors.  She said the her visitors are a great distraction from the voices.  Pt talked about having ECT  and is she is very concerned about memory loss.   Pt's affect was flat but she brightened during conversation.  Her mood appears depressed and anxious      "

## 2017-11-12 NOTE — PROGRESS NOTES
SPIRITUAL HEALTH SERVICES  Spiritual Assessment Progress Note  [facility]  Pt participated in the Spirituality Group this morning.  She shared in group the stigma of being mentally ill.      She requested additional conversation following group in which she processed her mental health concerns - specifically hearing voices.  She named that they never go away but sometimes are louder than other times. She talked about her needs changing - sometimes needing to be alone and sometimes needing people.     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    She named living alone and fears hurting herself.  Family has offered to have her stay with them but she doesn't want to burden them.     listened as pt processed the above experiences and concerns.  Provided support and prayer.       continues to be available for support as needed.                                                                                                                                                     Teressa Tomas M.Div., [facility]  Staff    Pager [phone]

## 2017-11-12 NOTE — PROGRESS NOTES
[facility] Psychiatric Progress Note       Interim History     The patient's care was discussed with the treatment team and chart notes were reviewed. Pt seen on SDU. Tolerating medications without side effects. Side effects, risks, and benefits of medications reviewed with patient. Patient is currently negative for suicide ideation, negative for plan or intent, able to contract no self harm and identify barriers to suicide. Pt is not improving. She remains psychotic and is still hearing voices. Pt is having problems sleeping. Staff report pt is complaining of hearing command hallucinations will Increased Seroquel to 200mg qhs and encourage pt to take prns of Seroquel to 50mg [id number] PRN. Decreased Effexor XR to 150mg qd.      Hospital Course     On 11/9 started Abilify 5mg qd, started Seroquel 25mg [id number] PRN, and discontinued Zyprexa. Planned to call her mother and sister for collateral history. On 11/10 Pt remained psychotic. She was not sleeping well, so increased Seroquel to 100mg qhs and 50mg [id number] PRN. On 11/12 Pt was not improving, she was still hearing voices. Increased Seroquel to 200mg qhs and decreased Effexor to 150mg qd.      Medications     Current Facility-Administered Medications Ordered in Epic   Medication Dose Route Frequency Last Rate Last Dose     QUEtiapine (SEROquel) tablet 200 mg  200 mg Oral At Bedtime         [START ON 11/13/2017] venlafaxine (EFFEXOR-XR) 24 hr capsule 150 mg  150 mg Oral QAM         sennosides (SENOKOT) tablet 8.6 mg  8.6 mg Oral BID PRN   8.6 mg at 11/12/17 0803     acetaminophen (TYLENOL) tablet 650 mg  650 mg Oral Q4H PRN   650 mg at 11/11/17 0723     QUEtiapine (SEROquel) tablet 50 mg  50 mg Oral Q2H PRN   50 mg at 11/12/17 0803     ARIPiprazole (ABILIFY) tablet 5 mg  5 mg Oral Daily   5 mg at 11/12/17 0803     cholecalciferol (vitamin D3) tablet 1,000 Units  1,000 Units Oral Daily   1,000 Units at 11/12/17 0803     clonazePAM (klonoPIN) tablet 0.5  "mg  0.5 mg Oral TID   0.5 mg at 11/12/17 0803     OLANZapine zydis (zyPREXA) ODT tab 5-10 mg  5-10 mg Oral Q6H PRN   10 mg at 11/12/17 0640     No current Epic-ordered outpatient prescriptions on file.         Allergies      Allergies   Allergen Reactions     Diphenhydramine Hcl      Throat started to close up        Medical Review of Systems     /87  Pulse 86  Temp 97.7  F (36.5  C) (Oral)  Resp 16  Ht 1.676 m (5' 5.98\")  Wt 83.5 kg (184 lb)  SpO2 98%  BMI 29.71 kg/m2  Body mass index is 29.71 kg/(m^2).  A 10-point review of systems was performed by Mauri Fleming MD and is negative, no new findings.      Psychiatric Examination     Appearance Sitting in chair, dressed in casual clothes. Appears stated age.   Attitude Cooperative   Orientation Oriented to person, place, time   Eye Contact Poor   Speech Pressured   Language Normal   Psychomotor Behavior Normal   Mood Remains anxious and elevated   Affect Limited range   Thought Process Goal-Oriented, Intact   Associations Intact   Thought Content Patient is currently negative for suicide ideation, negative for plan or intent, able to contract no self harm and identify barriers to suicide. Positive for obsessions, compulsions or psychosis.      Fund of Knowledge Average   Insight Poor   Judgement Impaired   Attention Span & Concentration Impaired   Recent & Remote Memory Intact   Gait Normal   Muscle Tone Intact        Labs     Labs reviewed.  No results found for this or any previous visit (from the past 24 hour(s)).     Jina Mcnair is a 47 year old female with a history of depression, anxiety, and an eating disorder who presented to [facility] ED for evaluation and treatment of progressively worsening auditory hallucinations, paranoia, and thoughts of self-harm. Pt has a long history of depression and anxiety, and has a currently inactive eating disorder. She remains psychotic and is sleeping poorly. Increase Seroquel to 200mg " qhs. Decrease Venlafaxine to 150mg qd.      Diagnoses     1. Major depressive disorder, recurrent, severe   2. Anxiety, NOS   3. Eating disorder, inactive      Plan     1. Explained side effects, benefits, and complications of medications to the patient, Pt gave verbal consent.  2. Medication changes: increase Seroquel to 200mg qhs, decrease Venlafaxine to 150mg qd  3. Discussed treatment plan with patient and team.  4. Projected length of stay: until Pt is stabilized      Attestation:   Patient has been seen and evaluated by me, Mauri Flmeing MD.    Patient ID:  Name: Abi Mcnair    MRN: [id number]  Admission: 11/8/2017     YOB: 1970

## 2017-11-13 PROCEDURE — 25000132 ZZH RX MED GY IP 250 OP 250 PS 637: Performed by: PSYCHIATRY & NEUROLOGY

## 2017-11-13 PROCEDURE — 12400006 ZZH R&B MH INTERMEDIATE

## 2017-11-13 PROCEDURE — 90791 PSYCH DIAGNOSTIC EVALUATION: CPT

## 2017-11-13 RX ORDER — QUETIAPINE FUMARATE 400 MG/1
400 TABLET, FILM COATED ORAL AT BEDTIME
Status: DISCONTINUED | OUTPATIENT
Start: 2017-11-13 | End: 2017-11-16 | Stop reason: HOSPADM

## 2017-11-13 RX ORDER — BISACODYL 10 MG
10 SUPPOSITORY, RECTAL RECTAL DAILY PRN
Status: DISCONTINUED | OUTPATIENT
Start: 2017-11-13 | End: 2017-11-16 | Stop reason: HOSPADM

## 2017-11-13 RX ORDER — QUETIAPINE FUMARATE 100 MG/1
100 TABLET, FILM COATED ORAL
Status: DISCONTINUED | OUTPATIENT
Start: 2017-11-13 | End: 2017-11-16 | Stop reason: HOSPADM

## 2017-11-13 RX ORDER — DOCUSATE SODIUM 100 MG/1
100 CAPSULE, LIQUID FILLED ORAL 2 TIMES DAILY PRN
Status: DISCONTINUED | OUTPATIENT
Start: 2017-11-13 | End: 2017-11-16 | Stop reason: HOSPADM

## 2017-11-13 RX ADMIN — QUETIAPINE FUMARATE 100 MG: 100 TABLET ORAL at 10:22

## 2017-11-13 RX ADMIN — DOCUSATE SODIUM 100 MG: 100 CAPSULE, LIQUID FILLED ORAL at 09:07

## 2017-11-13 RX ADMIN — CLONAZEPAM 0.5 MG: 0.5 TABLET ORAL at 20:24

## 2017-11-13 RX ADMIN — OLANZAPINE 10 MG: 5 TABLET, ORALLY DISINTEGRATING ORAL at 09:52

## 2017-11-13 RX ADMIN — BISACODYL 10 MG: 10 SUPPOSITORY RECTAL at 19:59

## 2017-11-13 RX ADMIN — OLANZAPINE 10 MG: 5 TABLET, ORALLY DISINTEGRATING ORAL at 16:35

## 2017-11-13 RX ADMIN — CLONAZEPAM 0.5 MG: 0.5 TABLET ORAL at 15:52

## 2017-11-13 RX ADMIN — QUETIAPINE FUMARATE 400 MG: 400 TABLET, FILM COATED ORAL at 20:24

## 2017-11-13 RX ADMIN — VENLAFAXINE HYDROCHLORIDE 150 MG: 150 CAPSULE, EXTENDED RELEASE ORAL at 07:50

## 2017-11-13 RX ADMIN — MAGNESIUM HYDROXIDE 30 ML: 400 SUSPENSION ORAL at 09:52

## 2017-11-13 RX ADMIN — ARIPIPRAZOLE 5 MG: 5 TABLET ORAL at 07:50

## 2017-11-13 RX ADMIN — SENNOSIDES 8.6 MG: 8.6 TABLET, FILM COATED ORAL at 03:02

## 2017-11-13 RX ADMIN — QUETIAPINE FUMARATE 50 MG: 50 TABLET, FILM COATED ORAL at 03:02

## 2017-11-13 RX ADMIN — VITAMIN D, TAB 1000IU (100/BT) 1000 UNITS: 25 TAB at 07:50

## 2017-11-13 RX ADMIN — QUETIAPINE FUMARATE 100 MG: 100 TABLET ORAL at 15:21

## 2017-11-13 RX ADMIN — CLONAZEPAM 0.5 MG: 0.5 TABLET ORAL at 07:50

## 2017-11-13 RX ADMIN — OLANZAPINE 10 MG: 5 TABLET, ORALLY DISINTEGRATING ORAL at 03:02

## 2017-11-13 RX ADMIN — QUETIAPINE FUMARATE 50 MG: 50 TABLET, FILM COATED ORAL at 07:53

## 2017-11-13 RX ADMIN — BISACODYL 10 MG: 10 SUPPOSITORY RECTAL at 19:51

## 2017-11-13 RX ADMIN — QUETIAPINE FUMARATE 100 MG: 100 TABLET ORAL at 13:15

## 2017-11-13 NOTE — PROGRESS NOTES
"[facility] Psychiatric Progress Note       Interim History     The patient's care was discussed with the treatment team and chart notes were reviewed. Pt seen on SDU. Tolerating medications without side effects. Side effects, risks, and benefits of medications reviewed with patient. Patient is currently negative for suicide ideation, negative for plan or intent, able to contract no self harm and identify barriers to suicide. Pt is better today, she is a lot calmer and her sleep is improving. She is still hearing voices. She states the voices are \"changing,\" they are arguing with each other, trying to \"strangle\" each other, and are verbally attacking her by telling her \"we are here to make sure you do not get better.\" Pt states \"there is an entity attached to me. A few days ago I was in my room and I could feel it climb on top of me, and now I cannot get it off of me. I can feel its hands on my shoulders.\" Seroquel 50mg is not making her tired or fatigued. Increase Seroquel to 400mg qhs, and increase PRN Seroquel to 100mg [id number]. Encouraged her to utilize PRN Seroquel at least 3x/day.      Hospital Course     On 11/9 started Abilify 5mg qd, started Seroquel 25mg [id number] PRN, and discontinued Zyprexa. Planned to call her mother and sister for collateral history. On 11/10 Pt remained psychotic. She was not sleeping well, so increased Seroquel to 100mg qhs and 50mg [id number] PRN. On 11/12 Pt was not improving, she was still hearing voices. Increased Seroquel to 200mg qhs and decreased Effexor to 150mg qd. On 11/13 Pt was calmer and sleeping better but still hallucinating. Increased Seroquel to 400mg qhs, and increased PRN Seroquel to 100mg [id number]. Encouraged her to utilize PRN Seroquel at least 3x/day.      Medications     Current Facility-Administered Medications Ordered in Epic   Medication Dose Route Frequency Last Rate Last Dose     docusate sodium (COLACE) capsule 100 mg  100 mg Oral BID PRN     " "    QUEtiapine (SEROquel) tablet 200 mg  200 mg Oral At Bedtime   200 mg at 11/12/17 2117     venlafaxine (EFFEXOR-XR) 24 hr capsule 150 mg  150 mg Oral QAM   150 mg at 11/13/17 0750     sennosides (SENOKOT) tablet 8.6 mg  8.6 mg Oral BID PRN   8.6 mg at 11/13/17 0302     acetaminophen (TYLENOL) tablet 650 mg  650 mg Oral Q4H PRN   650 mg at 11/11/17 0723     QUEtiapine (SEROquel) tablet 50 mg  50 mg Oral Q2H PRN   50 mg at 11/13/17 0753     ARIPiprazole (ABILIFY) tablet 5 mg  5 mg Oral Daily   5 mg at 11/13/17 0750     cholecalciferol (vitamin D3) tablet 1,000 Units  1,000 Units Oral Daily   1,000 Units at 11/13/17 0750     clonazePAM (klonoPIN) tablet 0.5 mg  0.5 mg Oral TID   0.5 mg at 11/13/17 0750     OLANZapine zydis (zyPREXA) ODT tab 5-10 mg  5-10 mg Oral Q6H PRN   10 mg at 11/13/17 0302     No current Epic-ordered outpatient prescriptions on file.         Allergies      Allergies   Allergen Reactions     Diphenhydramine Hcl      Throat started to close up        Medical Review of Systems     /78  Pulse 106  Temp 98.2  F (36.8  C) (Oral)  Resp 16  Ht 1.676 m (5' 5.98\")  Wt 83.5 kg (184 lb)  SpO2 98%  BMI 29.71 kg/m2  Body mass index is 29.71 kg/(m^2).  A 10-point review of systems was performed by Mauri Fleming MD and is negative, no new findings.      Psychiatric Examination     Appearance Sitting in chair, dressed in casual clothes. Appears stated age.   Attitude Cooperative   Orientation Oriented to person, place, time   Eye Contact Poor   Speech Normal   Language Normal   Psychomotor Behavior Normal   Mood Elevated, less anxious   Affect Fair range, calmer   Thought Process Goal-Oriented, Intact   Associations Intact   Thought Content Patient is currently negative for suicide ideation, negative for plan or intent, able to contract no self harm and identify barriers to suicide. Positive for obsessions, compulsions or psychosis.      Fund of Knowledge Average   Insight Fair   Judgement " Impaired   Attention Span & Concentration Alert   Recent & Remote Memory Intact   Gait Normal   Muscle Tone Intact        Labs     Labs reviewed.  No results found for this or any previous visit (from the past 24 hour(s)).     Impression     Abi Mcnair is a 47 year old female with a history of depression, anxiety, and an eating disorder who presented to [facility] ED for evaluation and treatment of progressively worsening auditory hallucinations, paranoia, and thoughts of self-harm. She is calmer today and her sleep is improving, but she is still hallucinating. Increase Seroquel to 400mg qhs and increase PRN Seroquel to 100mg [id number]. Encouraged her to use PRN Seroquel at least tid.      Diagnoses     1. Major depressive disorder, recurrent, severe   2. Anxiety, NOS   3. Eating disorder, inactive      Plan     1. Explained side effects, benefits, and complications of medications to the patient, Pt gave verbal consent.  2. Medication changes: increase Seroquel to 400mg qhs, increase Seroquel PRN to 100mg [id number]  3. Discussed treatment plan with patient and team.  4. Projected length of stay: until Pt is stabilized      Attestation:   Patient has been seen and evaluated by me, Mauri Fleming MD.    Patient ID:  Name: Abi Mcnair    MRN: [id number]  Admission: 11/8/2017     YOB: 1970

## 2017-11-13 NOTE — PLAN OF CARE
"Problem: General Plan of Care (Inpatient Behavioral)  Goal: Individualization/Patient Specific Goal (IP Behavioral)  The patient and/or their representative will achieve their patient-specific goals related to the plan of care.    The patient-specific goals include:   1. Medication compliance  2. Attend groups/socialize  3. Appropriate boundaries  4. Increased insight  5. Adequate sleep  6. F/U plan in place  7. Reality orientation   Outcome: No Change  Withdrawn and isolative this evening. Had visitors for part of the shift. C/o intermittently seeing small black warms in the corner of her vision and having auditory hallucinations. States \"at times I feel worse then when I first came into the hospital\" pt is hopefull that she will feel better with medication adjustments. Contracts for safety       "

## 2017-11-13 NOTE — PLAN OF CARE
Problem: Psychotic Symptoms  Goal: Psychotic Symptoms  Signs and symptoms of listed problems will be absent or manageable.     1. Mood stability  2. Medication regiment established/compliance  3. Decreased paranoia  4. Increased insight  5. Increased boundaries  6. Psychotic s/sx resolved  7. Positive coping skills established/utilized  8. Adequate sleep  9. Housing/community support  10. F/u plan in place   Outcome: No Change  Pt displays a superficially bright affect and calm mood. Pt did not attend any groups this shift. Spent the majority of the shift withdrawn and isolative to her room. She came in and out of her room multiple times, and would observe her surroundings appearing to be confused and then would go back inside her room. She made a few phone calls. Staff overheard pt's phone conversation complaining about her new roommate snoring loudly, but earplugs helped with this. Pt also mentioned not wanting to interact with anyone as she is not in the mood, but that she will read in her room and rest if tired.

## 2017-11-13 NOTE — PROGRESS NOTES
"Pt came up to the SDU window around 0300. She told staff that she was feeling really anxious and that she was seeing, \"moving things.\" Pt also stated that she, \"felt like there was something right behind me and it's like - tapping on my shoulders and following me around.\" She exhibited pressured speech and seemed to have some difficulty explaining what she was feeling. Staff suggested trying a PRN to help with anxiety and to help go back to sleep. PRN was given with relief.    "

## 2017-11-14 PROCEDURE — 25000132 ZZH RX MED GY IP 250 OP 250 PS 637: Performed by: PSYCHIATRY & NEUROLOGY

## 2017-11-14 PROCEDURE — 90853 GROUP PSYCHOTHERAPY: CPT

## 2017-11-14 PROCEDURE — 12400006 ZZH R&B MH INTERMEDIATE

## 2017-11-14 RX ORDER — CALCIUM CARBONATE 500 MG/1
500 TABLET, CHEWABLE ORAL DAILY PRN
Status: DISCONTINUED | OUTPATIENT
Start: 2017-11-14 | End: 2017-11-16 | Stop reason: HOSPADM

## 2017-11-14 RX ORDER — ARIPIPRAZOLE 5 MG/1
5 TABLET ORAL ONCE
Status: COMPLETED | OUTPATIENT
Start: 2017-11-14 | End: 2017-11-14

## 2017-11-14 RX ORDER — ARIPIPRAZOLE 10 MG/1
10 TABLET ORAL DAILY
Status: DISCONTINUED | OUTPATIENT
Start: 2017-11-15 | End: 2017-11-16 | Stop reason: HOSPADM

## 2017-11-14 RX ADMIN — OLANZAPINE 10 MG: 5 TABLET, ORALLY DISINTEGRATING ORAL at 12:09

## 2017-11-14 RX ADMIN — CLONAZEPAM 0.5 MG: 0.5 TABLET ORAL at 08:24

## 2017-11-14 RX ADMIN — DOCUSATE SODIUM 100 MG: 100 CAPSULE, LIQUID FILLED ORAL at 12:14

## 2017-11-14 RX ADMIN — ARIPIPRAZOLE 5 MG: 5 TABLET ORAL at 12:09

## 2017-11-14 RX ADMIN — CLONAZEPAM 0.5 MG: 0.5 TABLET ORAL at 20:21

## 2017-11-14 RX ADMIN — QUETIAPINE FUMARATE 100 MG: 100 TABLET ORAL at 17:34

## 2017-11-14 RX ADMIN — CLONAZEPAM 0.5 MG: 0.5 TABLET ORAL at 15:50

## 2017-11-14 RX ADMIN — CALCIUM CARBONATE (ANTACID) CHEW TAB 500 MG 500 MG: 500 CHEW TAB at 20:21

## 2017-11-14 RX ADMIN — ARIPIPRAZOLE 5 MG: 5 TABLET ORAL at 08:24

## 2017-11-14 RX ADMIN — QUETIAPINE FUMARATE 100 MG: 100 TABLET ORAL at 14:20

## 2017-11-14 RX ADMIN — VITAMIN D, TAB 1000IU (100/BT) 1000 UNITS: 25 TAB at 08:24

## 2017-11-14 RX ADMIN — BISACODYL 10 MG: 10 SUPPOSITORY RECTAL at 06:34

## 2017-11-14 RX ADMIN — VENLAFAXINE HYDROCHLORIDE 150 MG: 150 CAPSULE, EXTENDED RELEASE ORAL at 08:24

## 2017-11-14 RX ADMIN — QUETIAPINE FUMARATE 400 MG: 400 TABLET, FILM COATED ORAL at 20:21

## 2017-11-14 RX ADMIN — DOCUSATE SODIUM 100 MG: 100 CAPSULE, LIQUID FILLED ORAL at 20:21

## 2017-11-14 NOTE — PLAN OF CARE
Problem: Psychotic Symptoms  Goal: Psychotic Symptoms  Signs and symptoms of listed problems will be absent or manageable.     1. Mood stability  2. Medication regiment established/compliance  3. Decreased paranoia  4. Increased insight  5. Increased boundaries  6. Psychotic s/sx resolved  7. Positive coping skills established/utilized  8. Adequate sleep  9. Housing/community support  10. F/u plan in place   Outcome: No Change  Pt was isolative and withdrawn to room majority of shift. Pleasant and polite with staff and peers. Politely declined groups. Still c/o constipation, stated there was some movement but not a full BM. Endorsed hearing voices still, but they are conversing with each other about pt, not to pt directly.

## 2017-11-14 NOTE — PLAN OF CARE
"Problem: Psychotic Symptoms  Goal: Psychotic Symptoms  Signs and symptoms of listed problems will be absent or manageable.     1. Mood stability  2. Medication regiment established/compliance  3. Decreased paranoia  4. Increased insight  5. Increased boundaries  6. Psychotic s/sx resolved  7. Positive coping skills established/utilized  8. Adequate sleep  9. Housing/community support  10. F/u plan in place   Outcome: No Change  Pt presents as tense and anxious. Pt visible around unit; spent time in lounge watching tv and socializing with other patients. Pt's mother came to visit and it appeared to go well. Pt stated she still hears voices. During one-on-one pt stated the \"voices are still present, but acting differently than they had in the past.\" Pt was encouraged to go to group, but pt declined.       "

## 2017-11-14 NOTE — PROGRESS NOTES
SPIRITUAL HEALTH SERVICES Progress Note  [facility]    F/u visit with pt. Pt stated that she is having more success with her prayers by keeping her eyes open during prayer, which helps eliminate unwanted visualizations. Pt is struggling with feeling depressed and is hoping that her medication readjustment will help eliminate the voices. Pt requested prayer. SH prayed with pt. SH will continue to follow as available.      Bertha Escalante  Chaplain Resident

## 2017-11-14 NOTE — H&P
Case Management Psycho-Social Assessment    This information has been obtained from the patient's chart and from a personal interview with the patient.     Reason for Admission: Admitted to hospital with auditory hallucinations, paranoia and thoughts of self- harm.    Previous Mental & Chemical Health: Hospitalized at [facility] 11/6/17. Has avoided hospitalization in the past,due to limiting disclosure about her voices. She has a medication management provider at [facility] in [place] but no current therapist. Reports anxiety since middle school and history of eating disorder.  Denies use of drugs or tobacco and uses alcohol 1-2 x/ month. No history of chemical dependency treatment.    Family History:  Grew up in an intact family, the youngest of 4 daughters. Parents, Rosalinda and Anastacio, are living. Patient remains close to her family of origin. History of abuse or trauma denied.   Patient is single with no children.    Current Living Situation:   Lives by herself in an apartment in [place].    Education and Work History:  Graduated from [employer] in 1986. Works as a  and sitter. Graduated from [employer] in 2015 and also works as an .  No  service history.  Identifies as Confucianism- attends [employer].  Enjoys the outdoors, also reading, movies and plays, museums.     Insurance:  [employer]    Legal Issues :   Denies.    SS Assessment Needs & Plan:  Patient still fearful of the voices in her head plotting against her. Less affected by them than on admission, she reports. When voices gone or diminished and patient feeling more stable, anticipate she will return to outpatient care, either in group or individual setting. She is somewhat reluctant to commit to groups.

## 2017-11-14 NOTE — PROGRESS NOTES
"[facility] Psychiatric Progress Note       Interim History     The patient's care was discussed with the treatment team and chart notes were reviewed. Pt seen on SDU. Tolerating medications without side effects. Side effects, risks, and benefits of medications reviewed with patient. Patient is currently negative for suicide ideation, negative for plan or intent, able to contract no self harm and identify barriers to suicide. Pt is improving, she is calmer and less distressed by the day.Pt reports she is still hearing voices, and they continue to fight with each other. She still feels like there is \"an entity\" attached to her, and now she is seeing \"flashes of things that look like people, or bugs.\" She has been taking Zyprexa PRN because it \"makes the voices not so loud.\" Increase Abilify to 10mg qd.      Hospital Course     On 11/9 started Abilify 5mg qd, started Seroquel 25mg [id number] PRN, and discontinued Zyprexa. Planned to call her mother and sister for collateral history. On 11/10 Pt remained psychotic. She was not sleeping well, so increased Seroquel to 100mg qhs and 50mg [id number] PRN. On 11/12 Pt was not improving, she was still hearing voices. Increased Seroquel to 200mg qhs and decreased Effexor to 150mg qd. On 11/13 Pt was calmer and sleeping better but still hallucinating. Increased Seroquel to 400mg qhs, and increased PRN Seroquel to 100mg [id number]. Encouraged her to utilize PRN Seroquel at least 3x/day. On 11/14 Pt remained psychotic, she was still hearing voices and felt like there was \"an entity\" attached to her. She was also seeing \"flashes of things that look like people, or bugs.\" Increased Abilify to 10mg qd.      Medications     Current Facility-Administered Medications Ordered in Epic   Medication Dose Route Frequency Last Rate Last Dose     docusate sodium (COLACE) capsule 100 mg  100 mg Oral BID PRN   100 mg at 11/13/17 0907     QUEtiapine (SEROquel) tablet 100 mg  100 mg Oral " "Q2H PRN   100 mg at 11/13/17 1521     QUEtiapine (SEROquel) tablet 400 mg  400 mg Oral At Bedtime   400 mg at 11/13/17 2024     magnesium hydroxide (MILK OF MAGNESIA) suspension 30 mL  30 mL Oral Daily PRN   30 mL at 11/13/17 0952     bisacodyl (DULCOLAX) Suppository 10 mg  10 mg Rectal Daily PRN   10 mg at 11/14/17 0634     venlafaxine (EFFEXOR-XR) 24 hr capsule 150 mg  150 mg Oral QAM   150 mg at 11/14/17 0824     sennosides (SENOKOT) tablet 8.6 mg  8.6 mg Oral BID PRN   8.6 mg at 11/13/17 0302     acetaminophen (TYLENOL) tablet 650 mg  650 mg Oral Q4H PRN   650 mg at 11/11/17 0723     ARIPiprazole (ABILIFY) tablet 5 mg  5 mg Oral Daily   5 mg at 11/14/17 0824     cholecalciferol (vitamin D3) tablet 1,000 Units  1,000 Units Oral Daily   1,000 Units at 11/14/17 0824     clonazePAM (klonoPIN) tablet 0.5 mg  0.5 mg Oral TID   0.5 mg at 11/14/17 0824     OLANZapine zydis (zyPREXA) ODT tab 5-10 mg  5-10 mg Oral Q6H PRN   10 mg at 11/13/17 1635     No current Epic-ordered outpatient prescriptions on file.         Allergies      Allergies   Allergen Reactions     Diphenhydramine Hcl      Throat started to close up        Medical Review of Systems     /76  Pulse 126  Temp 99.1  F (37.3  C) (Oral)  Resp 16  Ht 1.676 m (5' 5.98\")  Wt 85.9 kg (189 lb 4.8 oz)  SpO2 98%  BMI 30.57 kg/m2  Body mass index is 30.57 kg/(m^2).  A 10-point review of systems was performed by Mauri Fleming MD and is negative, no new findings.      Psychiatric Examination     Appearance Sitting in chair, dressed in casual clothes. Appears stated age.   Attitude Cooperative   Orientation Oriented to person, place, time   Eye Contact Poor   Speech Pressured   Language Normal   Psychomotor Behavior Normal   Mood Calmer, less anxious   Affect Fair range, less distressed   Thought Process Goal-Oriented, Intact   Associations Intact   Thought Content Patient is currently negative for suicide ideation, negative for plan or intent, able to " contract no self harm and identify barriers to suicide. Positive for obsessions, compulsions or psychosis.      Fund of Knowledge Average   Insight Poor   Judgement Impaired   Attention Span & Concentration Alert   Recent & Remote Memory Intact   Gait Normal   Muscle Tone Intact        Labs     Labs reviewed.  No results found for this or any previous visit (from the past 24 hour(s)).     Impression     Abi Mcnair is a 47 year old female with a history of depression, anxiety, and an eating disorder who presented to [facility] ED for evaluation and treatment of progressively worsening auditory hallucinations, paranoia, and thoughts of self-harm. She is calmer today and her sleep is improving, but she is still hallucinating. Increase Abilify to 10mg qd.      Diagnoses     1. Major depressive disorder, recurrent, severe   2. Anxiety, NOS   3. Eating disorder, inactive      Plan     1. Explained side effects, benefits, and complications of medications to the patient, Pt gave verbal consent.  2. Medication changes: increase Abilify to 10mg qd  3. Discussed treatment plan with patient and team.  4. Projected length of stay: until Pt is stabilized  5. Pt will follow up at [facility] for psychiatric care  6. She will need a referral to a therapist      Attestation:   Patient has been seen and evaluated by me, Mauri Fleming MD.    Patient ID:  Name: Abi Mcnair    MRN: [id number]  Admission: 11/8/2017     YOB: 1970

## 2017-11-15 PROCEDURE — 97150 GROUP THERAPEUTIC PROCEDURES: CPT | Mod: GO

## 2017-11-15 PROCEDURE — 25000132 ZZH RX MED GY IP 250 OP 250 PS 637: Performed by: PSYCHIATRY & NEUROLOGY

## 2017-11-15 PROCEDURE — 90853 GROUP PSYCHOTHERAPY: CPT

## 2017-11-15 PROCEDURE — 12400006 ZZH R&B MH INTERMEDIATE

## 2017-11-15 RX ADMIN — ACETAMINOPHEN 650 MG: 325 TABLET, FILM COATED ORAL at 08:18

## 2017-11-15 RX ADMIN — CALCIUM CARBONATE (ANTACID) CHEW TAB 500 MG 500 MG: 500 CHEW TAB at 19:16

## 2017-11-15 RX ADMIN — ACETAMINOPHEN 650 MG: 325 TABLET, FILM COATED ORAL at 15:53

## 2017-11-15 RX ADMIN — ARIPIPRAZOLE 10 MG: 10 TABLET ORAL at 08:18

## 2017-11-15 RX ADMIN — VENLAFAXINE HYDROCHLORIDE 150 MG: 150 CAPSULE, EXTENDED RELEASE ORAL at 08:18

## 2017-11-15 RX ADMIN — CLONAZEPAM 0.5 MG: 0.5 TABLET ORAL at 08:18

## 2017-11-15 RX ADMIN — OLANZAPINE 10 MG: 5 TABLET, ORALLY DISINTEGRATING ORAL at 08:18

## 2017-11-15 RX ADMIN — DOCUSATE SODIUM 100 MG: 100 CAPSULE, LIQUID FILLED ORAL at 05:34

## 2017-11-15 RX ADMIN — CALCIUM CARBONATE (ANTACID) CHEW TAB 500 MG 500 MG: 500 CHEW TAB at 06:02

## 2017-11-15 RX ADMIN — CALCIUM CARBONATE (ANTACID) CHEW TAB 500 MG 500 MG: 500 CHEW TAB at 21:54

## 2017-11-15 RX ADMIN — ACETAMINOPHEN 650 MG: 325 TABLET, FILM COATED ORAL at 04:49

## 2017-11-15 RX ADMIN — ACETAMINOPHEN 650 MG: 325 TABLET, FILM COATED ORAL at 11:47

## 2017-11-15 RX ADMIN — VITAMIN D, TAB 1000IU (100/BT) 1000 UNITS: 25 TAB at 08:18

## 2017-11-15 RX ADMIN — CLONAZEPAM 0.5 MG: 0.5 TABLET ORAL at 15:53

## 2017-11-15 RX ADMIN — CLONAZEPAM 0.5 MG: 0.5 TABLET ORAL at 21:54

## 2017-11-15 RX ADMIN — QUETIAPINE FUMARATE 400 MG: 400 TABLET, FILM COATED ORAL at 21:53

## 2017-11-15 RX ADMIN — QUETIAPINE FUMARATE 100 MG: 100 TABLET ORAL at 05:35

## 2017-11-15 NOTE — PROGRESS NOTES
"[facility] Psychiatric Progress Note       Interim History     The patient's care was discussed with the treatment team and chart notes were reviewed. Pt seen on SDU. Tolerating medications without side effects. Side effects, risks, and benefits of medications reviewed with patient. Patient is currently negative for suicide ideation, negative for plan or intent, able to contract no self harm and identify barriers to suicide. Pt continued to improve, she is calmer today. Discussed discharge. We do not yet have aftercare in place so discharge is not possible yet. Pt reports she is still hearing voices, though they are changing. The voices are bringing up things from her past now, and they are saying stranger less coherent things. She states she has been having \"panic attacks after visiting hours.\" She has been living on her own independently. She states that she feels that she may be ready to leave the hospital, but still find the voices scary. Plan to refer her to a psychologist and DBT. The [facility] is a good option as it is close to [facility], where she lives. Pt will need to be discharged soon as it appears she is enjoying being in the hospitalization and there is concern she will become entrenched.      Hospital Course     On 11/9 started Abilify 5mg qd, started Seroquel 25mg [id number] PRN, and discontinued Zyprexa. Planned to call her mother and sister for collateral history. On 11/10 Pt remained psychotic. She was not sleeping well, so increased Seroquel to 100mg qhs and 50mg [id number] PRN. On 11/12 Pt was not improving, she was still hearing voices. Increased Seroquel to 200mg qhs and decreased Effexor to 150mg qd. On 11/13 Pt was calmer and sleeping better but still hallucinating. Increased Seroquel to 400mg qhs, and increased PRN Seroquel to 100mg [id number]. Encouraged her to utilize PRN Seroquel at least 3x/day. On 11/14 Pt remained psychotic, she was still hearing voices and felt like there was \"an " "entity\" attached to her. She was also seeing \"flashes of things that look like people, or bugs.\" Increased Abilify to 10mg qd. On 11/15 Pt was doing well. She continued to hear voices, but she was no longer distressed and appeared to be enjoying herself in the hospital. Planned to discharge her soon for fear of her becoming entrenched. Planned to refer her to a therapist and DBT at the [facility].      Medications     Current Facility-Administered Medications Ordered in Epic   Medication Dose Route Frequency Last Rate Last Dose     ARIPiprazole (ABILIFY) tablet 10 mg  10 mg Oral Daily   10 mg at 11/15/17 0818     calcium carbonate (TUMS) chewable tablet 500 mg  500 mg Oral Daily PRN   500 mg at 11/15/17 0602     docusate sodium (COLACE) capsule 100 mg  100 mg Oral BID PRN   100 mg at 11/15/17 0534     QUEtiapine (SEROquel) tablet 100 mg  100 mg Oral Q2H PRN   100 mg at 11/15/17 0535     QUEtiapine (SEROquel) tablet 400 mg  400 mg Oral At Bedtime   400 mg at 11/14/17 2021     magnesium hydroxide (MILK OF MAGNESIA) suspension 30 mL  30 mL Oral Daily PRN   30 mL at 11/13/17 0952     bisacodyl (DULCOLAX) Suppository 10 mg  10 mg Rectal Daily PRN   10 mg at 11/14/17 0634     venlafaxine (EFFEXOR-XR) 24 hr capsule 150 mg  150 mg Oral QAM   150 mg at 11/15/17 0818     sennosides (SENOKOT) tablet 8.6 mg  8.6 mg Oral BID PRN   8.6 mg at 11/13/17 0302     acetaminophen (TYLENOL) tablet 650 mg  650 mg Oral Q4H PRN   650 mg at 11/15/17 0818     cholecalciferol (vitamin D3) tablet 1,000 Units  1,000 Units Oral Daily   1,000 Units at 11/15/17 0818     clonazePAM (klonoPIN) tablet 0.5 mg  0.5 mg Oral TID   0.5 mg at 11/15/17 0818     OLANZapine zydis (zyPREXA) ODT tab 5-10 mg  5-10 mg Oral Q6H PRN   10 mg at 11/15/17 0818     No current Epic-ordered outpatient prescriptions on file.         Allergies      Allergies   Allergen Reactions     Diphenhydramine Hcl      Throat started to close up        Medical Review of Systems     /86 " " Pulse 126  Temp 97.6  F (36.4  C) (Oral)  Resp 16  Ht 1.676 m (5' 5.98\")  Wt 85.9 kg (189 lb 4.8 oz)  SpO2 98%  BMI 30.57 kg/m2  Body mass index is 30.57 kg/(m^2).  A 10-point review of systems was performed by Mauri Fleming MD and is negative, no new findings.      Psychiatric Examination     Appearance Sitting in chair, dressed in casual clothes. Appears stated age.   Attitude Cooperative   Orientation Oriented to person, place, time   Eye Contact Poor   Speech Normal   Language Normal   Psychomotor Behavior Normal   Mood Good   Affect Broad range   Thought Process Goal-Oriented, Intact   Associations Intact   Thought Content Patient is currently negative for suicide ideation, negative for plan or intent, able to contract no self harm and identify barriers to suicide. Positive for obsessions, compulsions or psychosis.      Fund of Knowledge Average   Insight Poor   Judgement Intact   Attention Span & Concentration Alert   Recent & Remote Memory Intact   Gait Normal   Muscle Tone Intact        Labs     Labs reviewed.  No results found for this or any previous visit (from the past 24 hour(s)).     Impression     Abi Mcnair is a 47 year old female with a history of depression, anxiety, and an eating disorder who presented to [facility] ED for evaluation and treatment of progressively worsening auditory hallucinations, paranoia, and thoughts of self-harm. Pt is doing well. She is still hearing voices but is in a good mood. Will discharge her when aftercare is in place, within 1-2 days. Plan to refer her to a therapist and DBT at the [facility].      Diagnoses     1. Major depressive disorder, recurrent, severe   2. Anxiety, NOS   3. Eating disorder, inactive      Plan     1. Explained side effects, benefits, and complications of medications to the patient, Pt gave verbal consent.  2. Medication changes: none  3. Discussed treatment plan with patient and team.  4. Projected length of stay: discharge Pt in " the next 1-2 days  5. Pt will follow up at [facility] for psychiatric care and DBT  6. She will need a referral to a therapist      Attestation:   Patient has been seen and evaluated by me, Mauri Fleming MD.    Patient ID:  Name: Abi Mcnair    MRN: [id number]  Admission: 11/8/2017     YOB: 1970

## 2017-11-15 NOTE — PLAN OF CARE
Problem: Psychotic Symptoms  Goal: Psychotic Symptoms  Signs and symptoms of listed problems will be absent or manageable.     1. Mood stability  2. Medication regiment established/compliance  3. Decreased paranoia  4. Increased insight  5. Increased boundaries  6. Psychotic s/sx resolved  7. Positive coping skills established/utilized  8. Adequate sleep  9. Housing/community support  10. F/u plan in place     Outcome: No Change  Flat affect, but brightens with conversation, mood calm, pleasant & cooperative. Still endorses hearing voices, states that voice is now her dad telling her all the stuff she did as a kid. Visible in lounge and in hallway. Attended focus group and was active participant.

## 2017-11-15 NOTE — PLAN OF CARE
Problem: Psychotic Symptoms  Goal: Psychotic Symptoms  Signs and symptoms of listed problems will be absent or manageable.     1. Mood stability  2. Medication regiment established/compliance  3. Decreased paranoia  4. Increased insight  5. Increased boundaries  6. Psychotic s/sx resolved  7. Positive coping skills established/utilized  8. Adequate sleep  9. Housing/community support  10. F/u plan in place   Outcome: No Change  Patient presents a flat affect, but brightens upon approach. Her moon appears calm. Spent most of shift in her room bed resting and listening to music with the headphones. Her 3 sisters came to visit and spent time in the lounge conversing. Pt is not social with peers or staff in the unit, but is pleasant and polite. She remains withdrawn and isolative. Declined groups this aram. Continues to endorse auditory hallucinations.

## 2017-11-16 VITALS
HEART RATE: 126 BPM | OXYGEN SATURATION: 98 % | TEMPERATURE: 98 F | BODY MASS INDEX: 30.42 KG/M2 | DIASTOLIC BLOOD PRESSURE: 83 MMHG | SYSTOLIC BLOOD PRESSURE: 113 MMHG | HEIGHT: 66 IN | WEIGHT: 189.3 LBS | RESPIRATION RATE: 16 BRPM

## 2017-11-16 PROCEDURE — 90853 GROUP PSYCHOTHERAPY: CPT

## 2017-11-16 PROCEDURE — 25000132 ZZH RX MED GY IP 250 OP 250 PS 637: Performed by: PSYCHIATRY & NEUROLOGY

## 2017-11-16 PROCEDURE — 97150 GROUP THERAPEUTIC PROCEDURES: CPT | Mod: GO

## 2017-11-16 RX ORDER — OLANZAPINE 10 MG/1
10 TABLET, ORALLY DISINTEGRATING ORAL EVERY 6 HOURS PRN
Qty: 60 TABLET | Refills: 0 | Status: ON HOLD | OUTPATIENT
Start: 2017-11-16 | End: 2017-12-27

## 2017-11-16 RX ORDER — VENLAFAXINE HYDROCHLORIDE 150 MG/1
150 CAPSULE, EXTENDED RELEASE ORAL EVERY MORNING
Qty: 30 CAPSULE | Refills: 0 | Status: SHIPPED | OUTPATIENT
Start: 2017-11-17 | End: 2017-12-13

## 2017-11-16 RX ORDER — QUETIAPINE FUMARATE 100 MG/1
100 TABLET, FILM COATED ORAL
Qty: 60 TABLET | Refills: 0 | Status: ON HOLD | OUTPATIENT
Start: 2017-11-16 | End: 2017-12-27

## 2017-11-16 RX ORDER — QUETIAPINE FUMARATE 400 MG/1
400 TABLET, FILM COATED ORAL AT BEDTIME
Qty: 30 TABLET | Refills: 0 | Status: ON HOLD | OUTPATIENT
Start: 2017-11-16 | End: 2017-12-27

## 2017-11-16 RX ORDER — ARIPIPRAZOLE 10 MG/1
10 TABLET ORAL DAILY
Qty: 30 TABLET | Refills: 0 | Status: ON HOLD | OUTPATIENT
Start: 2017-11-17 | End: 2017-12-27

## 2017-11-16 RX ORDER — CLONAZEPAM 0.5 MG/1
0.5 TABLET ORAL 3 TIMES DAILY
Qty: 90 TABLET | Refills: 0
Start: 2017-11-16 | End: 2017-12-13

## 2017-11-16 RX ADMIN — ACETAMINOPHEN 650 MG: 325 TABLET, FILM COATED ORAL at 02:23

## 2017-11-16 RX ADMIN — CLONAZEPAM 0.5 MG: 0.5 TABLET ORAL at 07:48

## 2017-11-16 RX ADMIN — ACETAMINOPHEN 650 MG: 325 TABLET, FILM COATED ORAL at 11:03

## 2017-11-16 RX ADMIN — ARIPIPRAZOLE 10 MG: 10 TABLET ORAL at 07:48

## 2017-11-16 RX ADMIN — VENLAFAXINE HYDROCHLORIDE 150 MG: 150 CAPSULE, EXTENDED RELEASE ORAL at 07:48

## 2017-11-16 RX ADMIN — OLANZAPINE 10 MG: 5 TABLET, ORALLY DISINTEGRATING ORAL at 11:08

## 2017-11-16 RX ADMIN — VITAMIN D, TAB 1000IU (100/BT) 1000 UNITS: 25 TAB at 07:48

## 2017-11-16 NOTE — PROGRESS NOTES
Patient discharged denies suicidal ideation and has stable mood. Instructions gone over with patient regarding medications and follow up plan.  . Copies of discharge instructions ( After Visit Summary) given to patient.Pt states voices are still there but they do not bother her and she is able to ignore them. She is excited to go home and left the unit with her mother at 1500

## 2017-11-16 NOTE — PLAN OF CARE
Problem: General Plan of Care (Inpatient Behavioral)  Goal: Discharge Planning  Patient atteded most of a process group on 11/14/17. She listened- did not actively participate. She declined group on 11/13 and 11/15.

## 2017-11-16 NOTE — PLAN OF CARE
Problem: Psychotic Symptoms  Goal: Psychotic Symptoms  Signs and symptoms of listed problems will be absent or manageable.     1. Mood stability  2. Medication regiment established/compliance  3. Decreased paranoia  4. Increased insight  5. Increased boundaries  6. Psychotic s/sx resolved  7. Positive coping skills established/utilized  8. Adequate sleep  9. Housing/community support  10. F/u plan in place       Pt displays a full range affect and brightens upon approach and conversation.  Pt is pleasant, cooperative, and social with staff and peers.  Had her parents visit this evening and reported that the visit went well.  Spent the majority of the shift socializing in the lounge and halls and appears less depressed. Pt still endorses hearing voices and states that the voice is her dad.  Pt said that she is experiencing dry mouth from meds

## 2017-11-16 NOTE — DISCHARGE INSTRUCTIONS
Behavioral Discharge Planning and Instructions    Summary: Admitted to hospital due to hearing voices plotting against her.    Main Diagnosis: Major depressive disorder;Anxiety NOS; eating disorder, inactive     Major Treatments, Procedures and Findings: psychiatric assessment; medication adjustment    Symptoms to Report: feeling more aggressive, increased confusion or mood getting worse    Lifestyle Adjustment: Follow up with therapy and medication management as recommended.    Psychiatry Follow-up:     Appointment with Smita Mchugh, medication management, on Thursday 11/30/17 @ 11:40 am.  DBT intake with Jamison Rinaldi on Wednesday 11/22/17 @ 2 pm- 2 hour appointment- at [facility] [email or web address] [email or web address] [address] . Phone # [phone]  [facility]                                                                                            [address]  [place] [address]  Phone:  [phone]  Fax:  [phone]    Resources:   [facility] Service- [phone]  Crisis Intervention: [phone] or [phone] (TTY: [phone]).  Call anytime for help.  [place] [employer] [place] (www.[place].bandar.org): 986.742.7902 or 378-491-1339.  National Suicide Prevention Line (www.mentalhealthmn.org): 448-970-BMIA (0355)    General Medication Instructions:   See your medication sheet(s) for instructions.   Take all medicines as directed.  Make no changes unless your doctor suggests them.   Go to all your doctor visits.  Be sure to have all your required lab tests. This way, your medicines can be refilled on time.  Do not use any drugs not prescribed by your doctor.  Avoid alcohol.

## 2017-11-16 NOTE — DISCHARGE SUMMARY
"[facility]iatric Discharge Summary      DATE OF ADMISSION: 11/8/2017     DATE OF DISCHARGE: 11/16/2017    PRIMARY CARE PHYSICIAN: Ree Guerrero    IDENTIFICATION: Abi Mcnair is a 47 year old female with a history of depression, anxiety, and an eating disorder who presented to [facility] ED for evaluation and treatment of progressively worsening auditory hallucinations, paranoia, and thoughts of self-harm. For history, see dictation by Dr. Fleming on 11/9/2017. For physical summary, see dictation by Tabitha Sanchez MD on 11/8/2017.     HOSPITAL COURSE: Pt has been depressed since she was ~16 years old, however she states her current depression feels different that it has in the past. Pt has been hearing whispering voices for the last several weeks. On admission she was not sure what the voices were saying but she knew \"they are evil.\" She felt like cutting or killing herself to escape the stress. Pt reports now she can hear what what the voices are saying, and they are asking her \"why are you still alive? You are a loser. You should have killed yourself when you had the chance. I can't believe you are still here... I am stronger than you, you won't be able to get any help.\" She reports she does not feel depressed currently, her main complaint is the voices. Pt states she did go to [facility] on 11/6/17 for the above complaints and she states she was given olanzapine and discharged from the ED there. She states the medication makes her \"foggy\" and tired. She states that is has not helped with the voices. She had an episode like this ~12 years ago. She went on a medication that caused the voices to stay away, but she does not recall what it was. At some point she went on Effexor for depression, but it caused the voices to come back. She reports she is an anxious person, a worrier. Pt endorses consistent, pervasive sense of anxiety and worry. Pt finds this anxiety difficult to control, " "often resulting in restlessness, feeling on edge, irritability, and sleep disturbance. Discussed her past use of Klonopin. Dr. Fleming discussed the immediate negative effects of benzodiazapine use including increased fall risk and lowered IQ. Discussed addiction risk. Also mentioned the long-term detrimental effects including increased risk of dementia. Pt verbalized understanding. Plan to begin Abilify 5mg qd, begin Seroquel 25mg [id number] PRN, and discontinue Zyprexa. She reports her family is supportive and they are aware of her hospitalization. Asked her to sign a release for her mother and sister so that we may call them for collateral history, she agreed. Mother was called and confirmed the history of the present illness. She added that the anxiety started when the patient was in middle school. She also stated that the pt has a good support system with family and is very transparent with her mental health diagnoses with the family. Mother also added that the Pt had been off of her medications for about 8 weeks this summer but has been doing well since being back on them. She also confirms that there is no chemical dependency issues with Pt.     On 11/9 started Abilify 5mg qd, started Seroquel 25mg [id number] PRN, and discontinued Zyprexa. Planned to call her mother and sister for collateral history. On 11/10 Pt remained psychotic. She was not sleeping well, so increased Seroquel to 100mg qhs and 50mg [id number] PRN. On 11/12 Pt was not improving, she was still hearing voices. Increased Seroquel to 200mg qhs and decreased Effexor to 150mg qd. On 11/13 Pt was calmer and sleeping better but still hallucinating. Increased Seroquel to 400mg qhs, and increased PRN Seroquel to 100mg [id number]. Encouraged her to utilize PRN Seroquel at least 3x/day. On 11/14 Pt remained psychotic, she was still hearing voices and felt like there was \"an entity\" attached to her. She was also seeing \"flashes of things that look like people, " "or bugs.\" Increased Abilify to 10mg qd. On 11/15 Pt was doing well. She continued to hear voices, but she was no longer distressed and appeared to be enjoying herself in the hospital. Planned to discharge her soon for fear of her becoming entrenched. Planned to refer her to a therapist and DBT at the [facility]. On 11/16 Pt was no longer hearing voices, though still \"felt a presence\" in her head. She was doing much better, though, calm and collected. She had follow up arranged at the [facility] in [facility] for DBT, therapy, and medication management. She was stable and appropriate for discharge. Patient is currently negative for suicide ideation, negative for plan or intent, able to contract no self harm and identify barriers to suicide.  Negative for obsessions, compulsions or psychosis.    DISCHARGE MENTAL STATUS EXAMINATION:       Appearance Sitting in chair, dressed in casual clothes. Appears stated age.   Attitude Cooperative   Orientation Oriented to person, place, time   Eye Contact Poor   Speech Normal   Language Normal   Psychomotor Behavior Normal   Mood Good   Affect Broad range   Thought Process Goal-Oriented, Intact   Associations Intact   Thought Content Patient is currently negative for suicide ideation, negative for plan or intent, able to contract no self harm and identify barriers to suicide.    Fund of Knowledge Average   Insight Fair   Judgement Intact   Attention Span & Concentration Alert   Recent & Remote Memory Intact   Gait Normal   Muscle Tone Intact          LABORATORY DATA:    Refer to hospitalist admission dictation.  No results found for this or any previous visit (from the past 24 hour(s)).     /83  Pulse 126  Temp 98  F (36.7  C) (Oral)  Resp 16  Ht 1.676 m (5' 5.98\")  Wt 85.9 kg (189 lb 4.8 oz)  SpO2 98%  BMI 30.57 kg/m2     DISCHARGE MEDICATIONS:      Review of your medicines      START taking       Dose / Directions    ARIPiprazole 10 MG tablet   Commonly known as:  ABILIFY   Used for:  " Depression with anxiety        Dose:  10 mg   Start taking on:  11/17/2017   Take 1 tablet (10 mg) by mouth daily   Quantity:  30 tablet   Refills:  0       * QUEtiapine 400 MG tablet   Commonly known as:  SEROquel   Used for:  Depression with anxiety        Dose:  400 mg   Take 1 tablet (400 mg) by mouth At Bedtime   Quantity:  30 tablet   Refills:  0       * QUEtiapine 100 MG tablet   Commonly known as:  SEROquel   Used for:  Depression with anxiety        Dose:  100 mg   Take 1 tablet (100 mg) by mouth every 2 hours as needed (Anxiety, depression)   Quantity:  60 tablet   Refills:  0       * Notice:  This list has 2 medication(s) that are the same as other medications prescribed for you. Read the directions carefully, and ask your doctor or other care provider to review them with you.      CONTINUE these medicines which may have CHANGED, or have new prescriptions. If we are uncertain of the size of tablets/capsules you have at home, strength may be listed as something that might have changed.       Dose / Directions    clonazePAM 0.5 MG tablet   Commonly known as:  klonoPIN   This may have changed:    - medication strength  - how much to take   Used for:  Depression with anxiety        Dose:  0.5 mg   Take 1 tablet (0.5 mg) by mouth 3 times daily   Quantity:  90 tablet   Refills:  0       OLANZapine zydis 10 MG ODT tab   Commonly known as:  zyPREXA   This may have changed:    - medication strength  - how much to take  - when to take this  - reasons to take this   Used for:  Depression with anxiety        Dose:  10 mg   Take 1 tablet (10 mg) by mouth every 6 hours as needed for agitation   Quantity:  60 tablet   Refills:  0       venlafaxine 150 MG 24 hr capsule   Commonly known as:  EFFEXOR-XR   This may have changed:    - medication strength  - how much to take  - additional instructions   Used for:  Depression with anxiety        Dose:  150 mg   Start taking on:  11/17/2017   Take 1 capsule (150 mg) by mouth  every morning   Quantity:  30 capsule   Refills:  0         CONTINUE these medicines which have NOT CHANGED       Dose / Directions    HYDROXYZINE HCL PO        Dose:  25 mg   Take 25 mg by mouth 2 times daily as needed (sleep, anxiety)   Refills:  0       VITAMIN D (CHOLECALCIFEROL) PO        Dose:  1000 Units   Take 1,000 Units by mouth daily   Refills:  0            Where to get your medicines      These medications were sent to [facility] [address] [place] [address] AT [address] [address]    Hours:  24-hours Phone:  [phone]      ARIPiprazole 10 MG tablet     OLANZapine zydis 10 MG ODT tab     QUEtiapine 100 MG tablet     QUEtiapine 400 MG tablet     venlafaxine 150 MG 24 hr capsule         Some of these will need a paper prescription and others can be bought over the counter. Ask your nurse if you have questions.     You don't need a prescription for these medications      clonazePAM 0.5 MG tablet             DISCHARGE DIAGNOSES:    1. Major depressive disorder, recurrent, severe   2. Anxiety, NOS   3. Eating disorder, inactive     DISCHARGE PLAN:     4. Explained side effects, benefits, and complications of medications to the patient, Pt gave verbal consent.  5. Medication changes: none  6. Discussed treatment plan with patient and team.  7. Projected length of stay: discharge today  8. Pt will follow up at [facility] for psychiatric care, DBT, and therapy     DISCHARGE FOLLOW-UP:    Appointment with Smita Mchugh, medication management, on Thursday 11/30/17 @ 11:40 am.  DBT intake with Jamison Rinaldi on Wednesday 11/22/17 @ 2 pm- 2 hour appointment- at [facility] office@[facility] [address] [address] . Phone # [phone]  [facility]                                                                                            [address] [place] [address] [address]  [place]  [address]  Phone:  [phone]  Fax:   [phone]    Attestation:   Patient has been seen and evaluated by me, Mauri Fleming MD.    Patient ID:    Name: Abi Mcnair    MRN: [id number]  Admission: 11/8/2017     YOB: 1970

## 2017-11-16 NOTE — PROGRESS NOTES
"[facility] Progress Note       Interim History     The patient's care was discussed with the treatment team and chart notes were reviewed. Pt seen on SDU. Tolerating medications without side effects. Side effects, risks, and benefits of medications reviewed with patient. Patient is currently negative for suicide ideation, negative for plan or intent, able to contract no self harm and identify barriers to suicide. Pt states that \"my dad was in my head all day yesterday, he was not saying anything, but I could tell he is there. I could feel his disappointment.\" She was not hearing voices, however.  In reality her father is supportive. Discussed DBT and CBT and that it can help people reduce tangential thought processes. Pt will be referred to DBT at [facility] in [facility]. Her parents will pick her up. She will live alone and she is confident in doing so. Pt was appropriate to discharge home.      Hospital Course     On 11/9 started Abilify 5mg qd, started Seroquel 25mg [id number] PRN, and discontinued Zyprexa. Planned to call her mother and sister for collateral history. On 11/10 Pt remained psychotic. She was not sleeping well, so increased Seroquel to 100mg qhs and 50mg [id number] PRN. On 11/12 Pt was not improving, she was still hearing voices. Increased Seroquel to 200mg qhs and decreased Effexor to 150mg qd. On 11/13 Pt was calmer and sleeping better but still hallucinating. Increased Seroquel to 400mg qhs, and increased PRN Seroquel to 100mg [id number]. Encouraged her to utilize PRN Seroquel at least 3x/day. On 11/14 Pt remained psychotic, she was still hearing voices and felt like there was \"an entity\" attached to her. She was also seeing \"flashes of things that look like people, or bugs.\" Increased Abilify to 10mg qd. On 11/15 Pt was doing well. She continued to hear voices, but she was no longer distressed and appeared to be enjoying herself in the hospital. Planned to discharge her soon for " "fear of her becoming entrenched. Planned to refer her to a therapist and DBT at the [facility]. On 11/16 Pt was no longer hearing voices, though still \"felt a presence\" in her head. She was doing much better, though, calm and collected. She had follow up arranged at the [facility] in [facility] for DBT, therapy, and medication management. She was stable and appropriate for discharge. Patient is currently negative for suicide ideation, negative for plan or intent, able to contract no self harm and identify barriers to suicide.  Negative for obsessions, compulsions or psychosis.        Medications     Current Facility-Administered Medications Ordered in Epic   Medication Dose Route Frequency Last Rate Last Dose     ARIPiprazole (ABILIFY) tablet 10 mg  10 mg Oral Daily   10 mg at 11/16/17 0748     calcium carbonate (TUMS) chewable tablet 500 mg  500 mg Oral Daily PRN   500 mg at 11/15/17 2154     docusate sodium (COLACE) capsule 100 mg  100 mg Oral BID PRN   100 mg at 11/15/17 0534     QUEtiapine (SEROquel) tablet 100 mg  100 mg Oral Q2H PRN   100 mg at 11/15/17 0535     QUEtiapine (SEROquel) tablet 400 mg  400 mg Oral At Bedtime   400 mg at 11/15/17 2153     magnesium hydroxide (MILK OF MAGNESIA) suspension 30 mL  30 mL Oral Daily PRN   30 mL at 11/13/17 0952     bisacodyl (DULCOLAX) Suppository 10 mg  10 mg Rectal Daily PRN   10 mg at 11/14/17 0634     venlafaxine (EFFEXOR-XR) 24 hr capsule 150 mg  150 mg Oral QAM   150 mg at 11/16/17 0748     sennosides (SENOKOT) tablet 8.6 mg  8.6 mg Oral BID PRN   8.6 mg at 11/13/17 0302     acetaminophen (TYLENOL) tablet 650 mg  650 mg Oral Q4H PRN   650 mg at 11/16/17 1103     cholecalciferol (vitamin D3) tablet 1,000 Units  1,000 Units Oral Daily   1,000 Units at 11/16/17 0748     clonazePAM (klonoPIN) tablet 0.5 mg  0.5 mg Oral TID   0.5 mg at 11/16/17 0748     OLANZapine zydis (zyPREXA) ODT tab 5-10 mg  5-10 mg Oral Q6H PRN   10 mg at 11/16/17 1108     No current Epic-ordered outpatient " "prescriptions on file.         Allergies      Allergies   Allergen Reactions     Diphenhydramine Hcl      Throat started to close up        Medical Review of Systems     /83  Pulse 126  Temp 98  F (36.7  C) (Oral)  Resp 16  Ht 1.676 m (5' 5.98\")  Wt 85.9 kg (189 lb 4.8 oz)  SpO2 98%  BMI 30.57 kg/m2  Body mass index is 30.57 kg/(m^2).  A 10-point review of systems was performed by Mauri Fleming MD and is negative, no new findings.      Psychiatric Examination     Appearance Sitting in chair, dressed in casual clothes. Appears stated age.   Attitude Cooperative   Orientation Oriented to person, place, time   Eye Contact Poor   Speech Normal   Language Normal   Psychomotor Behavior Normal   Mood Good   Affect Broad range   Thought Process Goal-Oriented, Intact   Associations Intact   Thought Content Patient is currently negative for suicide ideation, negative for plan or intent, able to contract no self harm and identify barriers to suicide.    Fund of Knowledge Average   Insight Fair   Judgement Intact   Attention Span & Concentration Alert   Recent & Remote Memory Intact   Gait Normal   Muscle Tone Intact        Labs     Labs reviewed.  No results found for this or any previous visit (from the past 24 hour(s)).     Impression     Abi Mcnair is a 47 year old female with a history of depression, anxiety, and an eating disorder [facility] treatment of progressively worsening auditory hallucinations, paranoia, and thoughts of self-harm. Pt is doing well. She is no longer hearing voices though still \"feels a presence\" in her head. She is much calmer, however, and feels stable for discharge. She has follow up therapy and DBT at the [facility]. Patient is currently negative for suicide ideation, negative for plan or intent, able to contract no self harm and identify barriers to suicide.  She will discharge today.      Diagnoses     1. Major depressive disorder, " recurrent, severe   2. Anxiety, NOS   3. Eating disorder, inactive      Plan     1. Explained side effects, benefits, and complications of medications to the patient, Pt gave verbal consent.  2. Medication changes: none  3. Discussed treatment plan with patient and team.  4. Projected length of stay: discharge today  5. Pt will follow up at [facility] for psychiatric care, DBT, and therapy       Attestation:   Patient has been seen and evaluated by me, Mauri Fleming MD.    Patient ID:  Name: Abi Mcnair    MRN: [id number]  Admission: 11/8/2017     YOB: 1970

## 2017-11-17 ENCOUNTER — TELEPHONE (OUTPATIENT)
Dept: FAMILY MEDICINE | Facility: CLINIC | Age: 47
End: 2017-11-17

## 2017-11-17 NOTE — TELEPHONE ENCOUNTER
"Hospital/TCU/ED for chronic condition Discharge Protocol    \"Hi, my name is Karla Lyla, a registered nurse, and I am calling from [facility].  I am calling to follow up and see how things are going for you after your recent emergency visit/hospital/TCU stay.\"    Tell me how you are doing now that you are home?\" -Yesterday was good.  Had dinner with her parents    -Woke up this morning, and 10-15 minutes later \"things got bad.\"      -Mother called her this morning to check in on her    -Called a crisis line because her voices were laughing at her    -Crisis line helped her not feel suicidal and directed her attention to focusing on tasks needing to be completed today: getting ready for the day, feeding her cats and giving cats their medicine    -Took her daily medications and plans to take Seroquel 100 mg and then drive to her parents's house.  Did take Clonazepam this AM.    -Writer recommended patient not drive after taking Clonazepam or Seroquel as these medications can cause drowsiness and delay reaction time.  Patient agreed to call parents to have one of them pick her up.    -Wondering if she can see a psychiatrist through [facility].  Writer did encourage patient to reach out to [facility] with this question and to let them know what she experienced this morning.    Patient verbalized understanding and in agreement with plan.          Discharge Instructions    \"Let's review your discharge instructions.  What is/are the follow-up recommendations?  Pt. Response: Follow up with medication management on 11/30/17 and Jamison Rinaldi for DBT intake on 11/22/17.  Has phone number for [facility]    \"Has an appointment with your primary care provider been scheduled?\"   No, only wishes to follow up with psych at this time    \"When you see the provider, I would recommend that you bring your medications with you.\"    Medications    \"Tell me what changed about your medicines when you discharged?\"    Changes to chronic meds?    More " "than 2, but patient already has medication management appt on 11/30/17.    \"What questions do you have about your medications?\"    None     New diagnoses of heart failure, COPD, diabetes, or MI?    No                  Medication reconciliation completed? Yes  Was MTM referral placed (*Make sure to put transitions as reason for referral)?   No    Call Summary    \"What questions or concerns do you have about your recent visit and your follow-up care?\"     none    \"If you have questions or things don't continue to improve, we encourage you contact us through the main clinic number (give number).  Even if the clinic is not open, triage nurses are available 24/7 to help you.     We would like you to know that our clinic has extended hours (provide information).  We also have urgent care (provide details on closest location and hours/contact info)\"      \"Thank you for your time and take care!\"         "

## 2017-11-25 ENCOUNTER — HEALTH MAINTENANCE LETTER (OUTPATIENT)
Age: 47
End: 2017-11-25

## 2017-12-13 ENCOUNTER — HOSPITAL ENCOUNTER (INPATIENT)
Facility: CLINIC | Age: 47
LOS: 14 days | Discharge: HOME | DRG: 885 | End: 2017-12-27
Attending: EMERGENCY MEDICINE | Admitting: PSYCHIATRY & NEUROLOGY
Payer: COMMERCIAL

## 2017-12-13 DIAGNOSIS — F29 PSYCHOSIS, UNSPECIFIED PSYCHOSIS TYPE (H): ICD-10-CM

## 2017-12-13 DIAGNOSIS — F33.3 SEVERE RECURRENT MAJOR DEPRESSION WITH PSYCHOTIC FEATURES (H): Primary | ICD-10-CM

## 2017-12-13 LAB
AMPHETAMINES UR QL SCN: NEGATIVE
ANION GAP SERPL CALCULATED.3IONS-SCNC: 8 MMOL/L (ref 3–14)
BARBITURATES UR QL: NEGATIVE
BASOPHILS # BLD AUTO: 0 10E9/L (ref 0–0.2)
BASOPHILS NFR BLD AUTO: 0.4 %
BENZODIAZ UR QL: NEGATIVE
BUN SERPL-MCNC: 17 MG/DL (ref 7–30)
CALCIUM SERPL-MCNC: 8.8 MG/DL (ref 8.5–10.1)
CANNABINOIDS UR QL SCN: NEGATIVE
CHLORIDE SERPL-SCNC: 107 MMOL/L (ref 94–109)
CO2 SERPL-SCNC: 25 MMOL/L (ref 20–32)
COCAINE UR QL: NEGATIVE
CREAT SERPL-MCNC: 0.69 MG/DL (ref 0.52–1.04)
DIFFERENTIAL METHOD BLD: NORMAL
EOSINOPHIL # BLD AUTO: 0.4 10E9/L (ref 0–0.7)
EOSINOPHIL NFR BLD AUTO: 4.6 %
ERYTHROCYTE [DISTWIDTH] IN BLOOD BY AUTOMATED COUNT: 13 % (ref 10–15)
GFR SERPL CREATININE-BSD FRML MDRD: >90 ML/MIN/1.7M2
GLUCOSE SERPL-MCNC: 166 MG/DL (ref 70–99)
HCG UR QL: NEGATIVE
HCT VFR BLD AUTO: 38.1 % (ref 35–47)
HGB BLD-MCNC: 13 G/DL (ref 11.7–15.7)
IMM GRANULOCYTES # BLD: 0.1 10E9/L (ref 0–0.4)
IMM GRANULOCYTES NFR BLD: 1.1 %
LYMPHOCYTES # BLD AUTO: 2.3 10E9/L (ref 0.8–5.3)
LYMPHOCYTES NFR BLD AUTO: 29.7 %
MCH RBC QN AUTO: 30.8 PG (ref 26.5–33)
MCHC RBC AUTO-ENTMCNC: 34.1 G/DL (ref 31.5–36.5)
MCV RBC AUTO: 90 FL (ref 78–100)
MONOCYTES # BLD AUTO: 0.6 10E9/L (ref 0–1.3)
MONOCYTES NFR BLD AUTO: 7.8 %
NEUTROPHILS # BLD AUTO: 4.4 10E9/L (ref 1.6–8.3)
NEUTROPHILS NFR BLD AUTO: 56.4 %
NRBC # BLD AUTO: 0 10*3/UL
NRBC BLD AUTO-RTO: 0 /100
OPIATES UR QL SCN: NEGATIVE
PCP UR QL SCN: NEGATIVE
PLATELET # BLD AUTO: 254 10E9/L (ref 150–450)
POTASSIUM SERPL-SCNC: 3.5 MMOL/L (ref 3.4–5.3)
RBC # BLD AUTO: 4.22 10E12/L (ref 3.8–5.2)
SODIUM SERPL-SCNC: 140 MMOL/L (ref 133–144)
WBC # BLD AUTO: 7.9 10E9/L (ref 4–11)

## 2017-12-13 PROCEDURE — 81025 URINE PREGNANCY TEST: CPT | Performed by: EMERGENCY MEDICINE

## 2017-12-13 PROCEDURE — 25000132 ZZH RX MED GY IP 250 OP 250 PS 637: Performed by: PSYCHIATRY & NEUROLOGY

## 2017-12-13 PROCEDURE — 36415 COLL VENOUS BLD VENIPUNCTURE: CPT | Performed by: PSYCHIATRY & NEUROLOGY

## 2017-12-13 PROCEDURE — 90791 PSYCH DIAGNOSTIC EVALUATION: CPT

## 2017-12-13 PROCEDURE — 85025 COMPLETE CBC W/AUTO DIFF WBC: CPT | Performed by: PSYCHIATRY & NEUROLOGY

## 2017-12-13 PROCEDURE — 99285 EMERGENCY DEPT VISIT HI MDM: CPT | Mod: 25

## 2017-12-13 PROCEDURE — 80307 DRUG TEST PRSMV CHEM ANLYZR: CPT | Performed by: EMERGENCY MEDICINE

## 2017-12-13 PROCEDURE — 80048 BASIC METABOLIC PNL TOTAL CA: CPT | Performed by: PSYCHIATRY & NEUROLOGY

## 2017-12-13 PROCEDURE — 12400006 ZZH R&B MH INTERMEDIATE

## 2017-12-13 RX ORDER — VENLAFAXINE HYDROCHLORIDE 150 MG/1
150 CAPSULE, EXTENDED RELEASE ORAL
Status: DISCONTINUED | OUTPATIENT
Start: 2017-12-14 | End: 2017-12-27 | Stop reason: HOSPADM

## 2017-12-13 RX ORDER — CLONAZEPAM 1 MG/1
1 TABLET ORAL 3 TIMES DAILY
Status: DISCONTINUED | OUTPATIENT
Start: 2017-12-13 | End: 2017-12-27 | Stop reason: HOSPADM

## 2017-12-13 RX ORDER — VENLAFAXINE HYDROCHLORIDE 150 MG/1
225 TABLET, EXTENDED RELEASE ORAL
COMMUNITY

## 2017-12-13 RX ORDER — OLANZAPINE 10 MG/1
10 TABLET, ORALLY DISINTEGRATING ORAL EVERY 6 HOURS PRN
Status: DISCONTINUED | OUTPATIENT
Start: 2017-12-13 | End: 2017-12-16

## 2017-12-13 RX ADMIN — CLONAZEPAM 1 MG: 1 TABLET ORAL at 20:54

## 2017-12-13 ASSESSMENT — ACTIVITIES OF DAILY LIVING (ADL): GROOMING: HANDWASHING

## 2017-12-13 ASSESSMENT — ENCOUNTER SYMPTOMS
ABDOMINAL PAIN: 0
HALLUCINATIONS: 1

## 2017-12-13 NOTE — IP AVS SNAPSHOT
[place] [address] [place] [address]    Phone:  [phone]                                       After Visit Summary   12/13/2017    Abi Mcnair    MRN: [id number]           After Visit Summary Signature Page     I have received my discharge instructions, and my questions have been answered. I have discussed any challenges I see with this plan with the nurse or doctor.    ..........................................................................................................................................  Patient/Patient Representative Signature      ..........................................................................................................................................  Patient Representative Print Name and Relationship to Patient    ..................................................               ................................................  Date                                            Time    ..........................................................................................................................................  Reviewed by Signature/Title    ...................................................              ..............................................  Date                                                            Time

## 2017-12-13 NOTE — ED PROVIDER NOTES
"  History     Chief Complaint:  Psychiatric Evaluation     HPI   Abi Mcnair is a 47 year old female with a history of psychosis, anxiety, depression, and auditory hallucinations whose mother presents her to the emergency department for a psychiatric evaluation. The patient was in the hospital here in November for a week for the same, and states that her voices, which began 3-4 weeks ago, had calmed at discharge but not gone away entirely. At that time, a \"loud chatter\" brought her in. Now, she can \"specifically pick out men's and women's voices\" in her head \"saying bad things\" about her. She has been taking her medications, and states that this \"chattering\" has become her baseline. However, she presents today because today she heard \"specifics about killing someone\", or killing herself, and voices telling her \"how great it would feel to watch life leave the body.\" She was unable to talk to her psychiatrist about this today because she is in the process of starting with a new one, and this new psychiatrist has not answered the phone and so she has only been able to leave messages. This frustrates both she and her mother. She states that she would like to continue to see Dr. Fleming, but he is not taking new patients right now and so she is unable to continue with him. Yesterday, the patient saw an NP who prescribed her Zyprexa for her agitation; she took one of these today along with her regular medications, but it did not alleviate her symptoms. Her medications have been changed several times since her discharge. She denies thoughts of hurting anyone else, but is afraid she may hurt herself in order to stop the voices and to \"block out\" what she is hearing. She does not deny suicidal ideation. She denies chest pain and abdominal pain.     The patient's mother states that she is concerned about the escalation of her daughter's hallucinations since her last admission. Moreover, she is frustrated by her " "daughter's inability to see Dr. Fleming, because the psychiatrist her daughter is starting at has been of little help to she or her daughter. She reports that they rarely answer the phone, and that there is no one to call for emergent situations. Rather, she has been told by them to bring her daughter back to the emergency department and this is frustrating to her.     Allergies:  Diphenhydramine Hcl      Medications:    Effexor  Abilify  Seroquel  Zyprexa  Hydroxyzine     Past Medical History:    Allergic rhinitis  Anxiety  Auditory hallucination  Depression with anxiety  Insomnia  Hyperlipidemia    Menorrhagia  Psychosis     Past Surgical History:    Right ganglionic cyst removal, wrist     Family History:    Varicose veins - Mother  Osteopenia - Mother  Arthritis - Father  Depression - Father   Lipids- Sister  Seizures - Sister    Social History:  The patient was accompanied to the ED by her mother.  Smoking Status: Never  Smokeless Tobacco: Never  Alcohol Use: 0-5 per month  Marital Status:  Single      Review of Systems   Cardiovascular: Negative for chest pain.   Gastrointestinal: Negative for abdominal pain.   Psychiatric/Behavioral: Positive for hallucinations and suicidal ideas.   All other systems reviewed and are negative.    Physical Exam     First Vitals:  BP: 115/83  Pulse: 91  Temp: 97.9  F (36.6  C)  Height: 167.6 cm (5' 6\")  Weight: 81.6 kg (180 lb)  SpO2: 97 %    Physical Exam  Constitutional: Heavyset white female supine  HENT: No signs of trauma.   Eyes: EOM are normal. Pupils are equal, round, and reactive to light.   Neck: Normal range of motion. No JVD present. No cervical adenopathy.  Cardiovascular: Regular rhythm.  Exam reveals no gallop and no friction rub.    No murmur heard.  Pulmonary/Chest: Bilateral breath sounds normal. No wheezes, rhonchi or rales.  Abdominal: Soft. No tenderness. No rebound or guarding.   Musculoskeletal: No edema. No tenderness.   Lymphadenopathy: No " lymphadenopathy.   Neurological: Alert and oriented to person, place, and time. Normal strength. Coordination normal.   Skin: Skin is warm and dry. No rash noted. No erythema.   Psych: Intense affect, admits to auditory hallucinations with thoughts of both suicidal and homicidal behavior. Reasonable insight, aware of hallucinations.      Emergency Department Course     Laboratory:  Laboratory findings were communicated with the patient and family who voiced understanding of the findings.    Drug abuse screen 77 urine: Pending    Emergency Department Course:  Nursing notes and vitals reviewed.  The patient provided a urine sample here in the emergency department. This was sent for laboratory testing, findings above.  1538: I performed an exam of the patient as documented above.   1646: I spoke to DEC about the patient.   1700: The patient was signed out to Dr. Barrera.  I personally reviewed the laboratory results with the Patient and her mother and answered all related questions prior to signing her out.     Impression & Plan      Medical Decision Making:  Abi Mcnair is a 47 year old female with a history of major depressive disorder and psychosis who was admitted in November from the 8th-16th. She was discharged with multiple medications including Abilify, Zyprexa, Seroquel, and Effexor. She states she was doing better and recently saw her new caregiver which I believe is a psych nurse practitioner who did add one medication and make some other changes. Today, the patient noted that she seemed to be hearing voices again and not just background chatter, but specific voices male and female suggesting she harm herself or what it would be like to harm somebody else. This is upsetting to her. She knows that these are voices, but is concerned and both she and her mother came her to be seen. On exam, the patient is intense but not inappropriate. She admits to the auditory hallucinations but seems to have decent  insight regarding these. She has no specific person she wants to harm. We will obtain a drug screen on her, and I have asked DEC to see and evaluate here to help determine whether patient requires admission or maybe discharge with appropriate follow up.     Patient will be signed out to Dr. Barrera     Diagnosis:    ICD-10-CM    1. Psychosis, unspecified psychosis type F29        Disposition:  Signed out to Dr. Barrera.   On [facility] awaiting bed and accepting MD    Scribe Disclosure:  I, Heather Frank, am serving as a scribe at 3:38 PM on 12/13/2017 to document services personally performed by Kenneth Gil MD based on my observations and the provider's statements to me.   12/13/2017    EMERGENCY DEPARTMENT       Kenneth Gil MD  12/13/17 6714

## 2017-12-13 NOTE — IP AVS SNAPSHOT
MRN:[id number]                      After Visit Summary   12/13/2017    Abi Mcnair    MRN: [id number]           Thank you!     Thank you for choosing [facility] for your care. Our goal is always to provide you with excellent care.        Patient Information     Date Of Birth          1970        Designated Caregiver       Most Recent Value    Caregiver    Will someone help with your care after discharge? no      About your hospital stay     You were admitted on:  December 13, 2017 You last received care in the:  [facility]    You were discharged on:  December 27, 2017       Who to Call     For medical emergencies, please call 911.  For non-urgent questions about your medical care, please call your primary care provider or clinic, None          Attending Provider     Provider Specialty    Kenneth Gil MD Emergency Medicine    Jennifer Barrera MD Emergency Medicine    Mauri Fleming MD Psychiatry       Primary Care Provider Fax #    Physician No Ref-Primary [phone]      Further instructions from your care team       Behavioral Discharge Planning and Instructions    Summary: Admitted to hospital for evaluation of worsening auditory hallucinations.    Main Diagnosis: Major depressive disorder with psychotic symptoms.     Major Treatments, Procedures and Findings: Psychiatric assessment.    Symptoms to Report: increased confusion or mood getting worse    Lifestyle Adjustment: Follow up with DBT, individual work and medication management. Work to attend to what you can do and need to do, rather than attending to your most distressing symptoms.    Psychiatry Follow-up:   Dr. Rodriguez medication management appointment on Friday 12/29/17 at 8 am.  DBT- Thursdays at 2:30 pm, so-Thursday 1/4/18 @2 pm  Therapy with Jamison Rinaldi- Fridays at 12 noon, so-Friday 1/5/18@12 noon.  Note: Jamison is not working on 12/28and 12/29- so no appointments those  "days.  [facility]  [address]  [place]  [address]  Phone:  [phone]  Fax:  [phone]    Resources:   Crisis Intervention: 459.261.5103 or 755-599-8159 (TTY: 266.361.4887).  Call anytime for help.  National [employer] Mental [employer]ness (www.[place].bandar.org): 845.268.3215 or 045-521-6822.  National Suicide Prevention Line (www.mentalhealthmn.org): 842-249-RFDH (9696)    General Medication Instructions:   See your medication sheet(s) for instructions.   Take all medicines as directed.  Make no changes unless your doctor suggests them.   Go to all your doctor visits.  Be sure to have all your required lab tests. This way, your medicines can be refilled on time.  Do not use any drugs not prescribed by your doctor.  Avoid alcohol.      Pending Results     No orders found from 12/11/2017 to 12/14/2017.            Statement of Approval     Ordered          12/27/17 1121  I have reviewed and agree with all the recommendations and orders detailed in this document.  EFFECTIVE NOW     Approved and electronically signed by:  Mauri Fleming MD             Admission Information     Date & Time Provider Department Dept. Phone    12/13/2017 Mauri Fleming MD [facility] [phone]      Your Vitals Were     Blood Pressure Pulse Temperature Respirations Height Weight    128/80 92 98  F (36.7  C) (Oral) 16 1.676 m (5' 6\") 89.6 kg (197 lb 9.6 oz)    Pulse Oximetry BMI (Body Mass Index)                97% 31.89 kg/m2          MyChart Information     [facility] gives you secure access to your electronic health record. If you see a primary care provider, you can also send messages to your care team and make appointments. If you have questions, please call your primary care clinic.  If you do not have a primary care provider, please call [phone] and they will assist you.        Care EveryWhere ID     This is your Care EveryWhere ID. This could be used by other " organizations to access your [facility] medical records  [id number]        Equal Access to Services     BOLACALLIE JORGE : Hadii carito Hay, franko watkins, angélica pelayo. So [facility] [phone].    ATENCIÓN: Si habla español, tiene a rodrigues disposición servicios gratuitos de asistencia lingüística. Llame al [phone].    We comply with applicable federal civil rights laws and [place] laws. We do not discriminate on the basis of race, color, national origin, age, disability, sex, sexual orientation, or gender identity.               Review of your medicines      START taking        Dose / Directions    lurasidone 120 MG Tabs tablet   Commonly known as:  LATUDA   Used for:  Severe recurrent major depression with psychotic features (H)        Dose:  120 mg   Take 1 tablet (120 mg) by mouth daily   Quantity:  30 tablet   Refills:  0         CONTINUE these medicines which may have CHANGED, or have new prescriptions. If we are uncertain of the size of tablets/capsules you have at home, strength may be listed as something that might have changed.        Dose / Directions    QUEtiapine 200 MG tablet   Commonly known as:  SEROquel   This may have changed:    - medication strength  - how much to take  - when to take this  - Another medication with the same name was removed. Continue taking this medication, and follow the directions you see here.   Used for:  Severe recurrent major depression with psychotic features (H)   Notes to Patient:  Spread doses out during the day        Dose:  200 mg   Take 1 tablet (200 mg) by mouth 3 times daily   Quantity:  90 tablet   Refills:  0         CONTINUE these medicines which have NOT CHANGED        Dose / Directions    CLONAZEPAM PO        Dose:  1 mg   Take 1 mg by mouth 3 times daily   Refills:  0       venlafaxine 150 MG Tb24 24 hr tablet   Commonly known as:  EFFEXOR-ER        Dose:  150 mg   Take 150 mg by mouth daily  (with breakfast)   Refills:  0       VITAMIN D (CHOLECALCIFEROL) PO        Dose:  1000 Units   Take 1,000 Units by mouth daily   Refills:  0         STOP taking     ALPRAZOLAM PO           ARIPiprazole 10 MG tablet   Commonly known as:  ABILIFY           HYDROXYZINE HCL PO           OLANZapine zydis 10 MG ODT tab   Commonly known as:  zyPREXA                Where to get your medicines      These medications were sent to [facility] [address] [place] [address] AT [address] [address]    Hours:  24-hours Phone:  [phone]     lurasidone 120 MG Tabs tablet    QUEtiapine 200 MG tablet                Protect others around you: Learn how to safely use, store and throw away your medicines at www.disposemymeds.org.             Medication List: This is a list of all your medications and when to take them. Check marks below indicate your daily home schedule. Keep this list as a reference.      Medications           Morning Afternoon Evening Bedtime As Needed    CLONAZEPAM PO   Take 1 mg by mouth 3 times daily   Last time this was given:  1 mg on 12/27/2017  7:32 AM                                         lurasidone 120 MG Tabs tablet   Commonly known as:  LATUDA   Take 1 tablet (120 mg) by mouth daily   Last time this was given:  120 mg on 12/26/2017  6:30 PM                    With food               QUEtiapine 200 MG tablet   Commonly known as:  SEROquel   Take 1 tablet (200 mg) by mouth 3 times daily   Last time this was given:  200 mg on 12/27/2017  7:32 AM   Notes to Patient:  Spread doses out during the day                                         venlafaxine 150 MG Tb24 24 hr tablet   Commonly known as:  EFFEXOR-ER   Take 150 mg by mouth daily (with breakfast)                                   VITAMIN D (CHOLECALCIFEROL) PO   Take 1,000 Units by mouth daily   Last time this was given:  1,000 Units on 12/27/2017  7:32 AM

## 2017-12-13 NOTE — ED PROVIDER NOTES
Endorsed to me by Dr. Gil pending placement.  Patient is voluntary admission for auditory hallucinations with reported suicidal and homicidal overtones.    1711: Notified by DEC that patient was accepted to [facility] under Dr. Fleming.     UPT negative.  UTox negative.    Patient remained calm and cooperative throughout her stay under my care in the emergency department and was transferred to psychiatric floor without issue.     Jennifer Barrera MD  12/13/17 1938

## 2017-12-13 NOTE — PHARMACY-ADMISSION MEDICATION HISTORY
"Admission medication history interview status for the 12/13/2017  admission is complete. See EPIC admission navigator for prior to admission medications     Medication history source reliability:Good    Actions taken by pharmacist (provider contacted, etc):None     Additional medication history information not noted on PTA med list :None    Medication reconciliation/reorder completed by provider prior to medication history? No    Time spent in this activity: 20\"    Prior to Admission medications    Medication Sig Last Dose Taking? Auth Provider   ALPRAZOLAM PO Take 0.5 mg by mouth daily as needed for anxiety 12/13/2017 at 13:15 Yes Reported, Patient   CLONAZEPAM PO Take 1 mg by mouth 3 times daily 12/13/2017 at x 2 doses Yes Unknown, Entered By History   venlafaxine (EFFEXOR-ER) 150 MG TB24 24 hr tablet Take 150 mg by mouth daily (with breakfast) 12/13/2017 at am Yes Unknown, Entered By History   ARIPiprazole (ABILIFY) 10 MG tablet Take 1 tablet (10 mg) by mouth daily 12/13/2017 at am Yes Mauri Fleming MD   QUEtiapine (SEROQUEL) 400 MG tablet Take 1 tablet (400 mg) by mouth At Bedtime 12/12/2017 at hs Yes Mauri Fleming MD   OLANZapine zydis (ZYPREXA) 10 MG ODT tab Take 1 tablet (10 mg) by mouth every 6 hours as needed for agitation 12/13/2017 at am Yes Mauri Fleming MD   QUEtiapine (SEROQUEL) 100 MG tablet Take 1 tablet (100 mg) by mouth every 2 hours as needed (Anxiety, depression) 12/13/2017 at 13:15 Yes Mauri Fleming MD   HYDROXYZINE HCL PO Take 25 mg by mouth daily as needed (anxiety, panic)  12/13/2017 at am Yes Reported, Patient   VITAMIN D, CHOLECALCIFEROL, PO Take 1,000 Units by mouth daily 12/13/2017 at am Yes Unknown, Entered By History          "

## 2017-12-14 PROCEDURE — 93005 ELECTROCARDIOGRAM TRACING: CPT

## 2017-12-14 PROCEDURE — 99222 1ST HOSP IP/OBS MODERATE 55: CPT | Performed by: NURSE PRACTITIONER

## 2017-12-14 PROCEDURE — 12400006 ZZH R&B MH INTERMEDIATE

## 2017-12-14 PROCEDURE — 93010 ELECTROCARDIOGRAM REPORT: CPT | Performed by: INTERNAL MEDICINE

## 2017-12-14 PROCEDURE — 25000132 ZZH RX MED GY IP 250 OP 250 PS 637: Performed by: PSYCHIATRY & NEUROLOGY

## 2017-12-14 PROCEDURE — 99207 ZZC CONSULT E&M CHANGED TO INITIAL LEVEL: CPT | Performed by: NURSE PRACTITIONER

## 2017-12-14 RX ORDER — POLYETHYLENE GLYCOL 3350 17 G/17G
17 POWDER, FOR SOLUTION ORAL DAILY
Status: DISCONTINUED | OUTPATIENT
Start: 2017-12-14 | End: 2017-12-27

## 2017-12-14 RX ORDER — LURASIDONE HYDROCHLORIDE 40 MG/1
40 TABLET, FILM COATED ORAL DAILY
Status: DISCONTINUED | OUTPATIENT
Start: 2017-12-14 | End: 2017-12-15

## 2017-12-14 RX ORDER — IBUPROFEN 400 MG/1
400 TABLET, FILM COATED ORAL EVERY 6 HOURS PRN
Status: DISCONTINUED | OUTPATIENT
Start: 2017-12-14 | End: 2017-12-27 | Stop reason: HOSPADM

## 2017-12-14 RX ORDER — ACETAMINOPHEN 500 MG
1000 TABLET ORAL EVERY 6 HOURS PRN
Status: DISCONTINUED | OUTPATIENT
Start: 2017-12-14 | End: 2017-12-27 | Stop reason: HOSPADM

## 2017-12-14 RX ORDER — DOCUSATE SODIUM 100 MG/1
100 CAPSULE, LIQUID FILLED ORAL 2 TIMES DAILY PRN
Status: DISCONTINUED | OUTPATIENT
Start: 2017-12-14 | End: 2017-12-27 | Stop reason: HOSPADM

## 2017-12-14 RX ADMIN — VENLAFAXINE HYDROCHLORIDE 150 MG: 150 CAPSULE, EXTENDED RELEASE ORAL at 08:00

## 2017-12-14 RX ADMIN — VITAMIN D, TAB 1000IU (100/BT) 1000 UNITS: 25 TAB at 08:00

## 2017-12-14 RX ADMIN — OLANZAPINE 10 MG: 10 TABLET, ORALLY DISINTEGRATING ORAL at 20:05

## 2017-12-14 RX ADMIN — OLANZAPINE 10 MG: 10 TABLET, ORALLY DISINTEGRATING ORAL at 06:03

## 2017-12-14 RX ADMIN — CLONAZEPAM 1 MG: 1 TABLET ORAL at 08:00

## 2017-12-14 RX ADMIN — LURASIDONE HYDROCHLORIDE 40 MG: 40 TABLET, FILM COATED ORAL at 17:34

## 2017-12-14 RX ADMIN — CLONAZEPAM 1 MG: 1 TABLET ORAL at 20:05

## 2017-12-14 RX ADMIN — CLONAZEPAM 1 MG: 1 TABLET ORAL at 15:38

## 2017-12-14 NOTE — PROGRESS NOTES
Welcome packet reviewed with patient. Information reviewed includes getting emergency help, preventing infections, understanding your care, using medication safely, reducing falls, preventing pressure ulcers, smoking cessation, powerful choices and Patients Bill of Rights. Pt. given tour of the unit and instruction on use of facility including emergency call light. Program schedule reviewed with patient. Questions regarding the unit addressed. Pt. Search completed and belongings inventoried.   Nursing assessment complete including patient and medication profiles. Risk assessments completed addressing suicide,fall,skin,nutrition and safety issues. Care plan initiated. Assessments reviewed with physician and admit orders received.

## 2017-12-14 NOTE — ED NOTES
"Abi presented to the ER with auditory hallucinations.  The voices got really loud this morning and the chattering is getting worse, voices are telling her \" how to hurt someone and how life will leave the body\" , she said \"men and women talk to her all the time.   She is frustrated because she has not been able to see a psychiatrist and would like to see Dr Fleming but he is not taking any patient.    Denies any suicidal ideations,  She said the voices are always present but they are getting meaner. Admission completed.  "

## 2017-12-14 NOTE — PLAN OF CARE
"Problem: Psychotic Symptoms  Goal: Psychotic Symptoms  Signs and symptoms of listed problems will be absent or manageable.   Outcome: No Change  Full range affect, polite, cooperative. Present in ITC lounge, minimally social but will engage in conversation when prompted. Endorses auditory hallucinations & \"sees dark figures\" when she closes her eyes. Pt stated that she has had \"chatter\" in her head for awhile but that it does not stop her from doing her daily things. However, last Thursday before her first DBT appt, the voices were loud and bothersome, telling her that \"if you want the voices to stop you will have to kill yourself.\" Denies SI or self harm intent. Med compliant.      "

## 2017-12-14 NOTE — H&P
[facility] Psychiatric H&P Note       Initial History     The patient's care was discussed with the treatment team and chart notes were reviewed.     Patient examined for psychiatric admission.     IDENTIFICATION    Abi Mcnair is a 47 year old female with a history of depression, anxiety, and an eating disorder who presented to [facility] ED for evaluation and treatment of progressively worsening auditory hallucinations, paranoia, and thoughts of self-harm. Pt sees PCP Ree Gusman. Pt's psychiatrist is Dr. Smita Mchugh at [facility].     HISTORY OF PRESENT ILLNESS    Ms. Abi Mcnair is a 47 year old  female with a PMHx of depression who presents as readmission due to worsening auditory and visual hallucinations. For additional history, please see Dr. Fleming's history on 11/19/17. She describes her voices as male and female. She reports that she can hear them  She also complains of constipation, taking Miralax, and also dry mouth. She also has issues concentrating mainly due to her hallucinations. She states that the voices lessen when she is engaged in activities. She states she does not feel too depressed while in the hospital.  However, she affirms feeling as if she is watched, which is not true when she is at home. She is more fearful. Denies suicidal or homicidal ideation.  She states she took one dose of Xanax which was not prescribed to her, and this was not beneficial. Per [place] , it appears her dose of Klonopin has been increased to 1mg tid. Pt is an anxious person, a worrier. Pt endorses consistent, pervasive sense of anxiety and worry. Pt finds this anxiety difficult to control, often resulting in restlessness, feeling on edge, irritability, and sleep disturbance.      CHEMICAL DEPENDENCY HISTORY    Pt denies the use of illicit substances and tobacco. She admits to using alcohol once or twice a month.     PAST  PSYCHIATRIC HISTORY    Pt has used Abilify in  "the past but she does not recall if it helped. She has also been taking Klonopin for some time, she claims she was not aware that it was addictive. She is currently on Effexor ER 150mg qAM, she reports no side effects but endorses symptoms of discontinuation syndrome if she misses a dose. She has a history of an eating disorder but she is not currently practicing. She used to diet and exercise excessively, she does not recall why she stopped. History of cutting as well.     FAMILY HISTORY    Pt's family history is positive for depression in her maternal grandfather, brother, father, and sister. There is a history of alcohol dependence in her paternal grandfather and her father.      SOCIAL HISTORY    Pt grew up in [place] and is the youngest of four children. She was raised by both parents and they were supportive. 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 She also works at [employer] as an , and does some \"alternative healing.\"       Hospital Course     On 12/14/17, pt was admitted to  and was started on Latuda 40mg q1800.      Medications     Prescriptions Prior to Admission   Medication Sig Dispense Refill Last Dose     ALPRAZOLAM PO Take 0.5 mg by mouth daily as needed for anxiety   12/13/2017 at 13:15     CLONAZEPAM PO Take 1 mg by mouth 3 times daily   12/13/2017 at x 2 doses     venlafaxine (EFFEXOR-ER) 150 MG TB24 24 hr tablet Take 150 mg by mouth daily (with breakfast)   12/13/2017 at am     ARIPiprazole (ABILIFY) 10 MG tablet Take 1 tablet (10 mg) by mouth daily 30 tablet 0 12/13/2017 at am     QUEtiapine (SEROQUEL) 400 MG tablet Take 1 tablet (400 mg) by mouth At Bedtime 30 tablet 0 12/12/2017 at hs     OLANZapine zydis (ZYPREXA) 10 MG ODT tab Take 1 tablet (10 mg) by mouth every 6 hours as needed for agitation 60 tablet 0 12/13/2017 at am     QUEtiapine (SEROQUEL) 100 MG tablet Take " "1 tablet (100 mg) by mouth every 2 hours as needed (Anxiety, depression) 60 tablet 0 12/13/2017 at 13:15     HYDROXYZINE HCL PO Take 25 mg by mouth daily as needed (anxiety, panic)    12/13/2017 at am     VITAMIN D, CHOLECALCIFEROL, PO Take 1,000 Units by mouth daily   12/13/2017 at am       Scheduled Medications:    polyethylene glycol  17 g Oral Daily     clonazePAM (klonoPIN) tablet 1 mg  1 mg Oral TID     venlafaxine  150 mg Oral Daily with breakfast     cholecalciferol  1,000 Units Oral Daily     PRNs:  acetaminophen, ibuprofen, docusate sodium, OLANZapine zydis      Allergies      Allergies   Allergen Reactions     Diphenhydramine Hcl      Throat started to close up        Previous Medical History     Past Medical History:   Diagnosis Date     Allergic rhinitis      Anxiety      Auditory hallucination      CARDIOVASCULAR SCREENING; LDL GOAL LESS THAN 160 5/9/2010     Depression with anxiety      Insomnia 5/8/2009        Medical Review of Systems     /82  Pulse 62  Temp 97.5  F (36.4  C) (Oral)  Resp 16  Ht 1.676 m (5' 6\")  Wt 85.1 kg (187 lb 11.2 oz)  SpO2 97%  BMI 30.3 kg/m2  Body mass index is 30.3 kg/(m^2).    Previous 10-point ROS completed by Tabitha Sanchez MD on 11/8/2017 reviewed by Mauri Fleming MD on November 9, 2017 and is unchanged except for those problems mentioned within the HPI.     Mental Status Examination     Appearance Sitting in chair, dressed in scrubs. Appears stated age.   Attitude Cooperative   Orientation Oriented to person, place, time   Eye Contact Poor   Speech Regular rate, rhythm, volume and tone   Language Normal   Psychomotor Behavior Normal   Mood Anxious   Affect Fair range   Thought Process Goal-Oriented, Intact   Associations Intact   Thought Content Patient is currently negative for suicide ideation, negative for plan or intent, able to contract no self harm and identify barriers to suicide. Positive for obsessions, compulsions or psychosis. Positive for " auditory hallucinations of homicidal/suicidal nature. Possibly persecutory delusions.   Fund of Knowledge Appropriate   Insight Fair   Judgement Impaired   Attention Span & Concentration Alert   Recent & Remote Memory Intact   Gait Normal   Muscle Tone Intact      Labs     Labs reviewed.  Recent Results (from the past 24 hour(s))   Drug abuse screen urine    Collection Time: 12/13/17  5:37 PM   Result Value Ref Range    Amphetamine Qual Urine Negative NEG^Negative    Barbiturates Qual Urine Negative NEG^Negative    Benzodiazepine Qual Urine Negative NEG^Negative    Cannabinoids Qual Urine Negative NEG^Negative    Cocaine Qual Urine Negative NEG^Negative    Opiates Qualitative Urine Negative NEG^Negative    PCP Qual Urine Negative NEG^Negative   HCG qualitative urine (UPT)    Collection Time: 12/13/17  5:37 PM   Result Value Ref Range    HCG Qual Urine Negative NEG^Negative   CBC with platelets differential    Collection Time: 12/13/17  8:05 PM   Result Value Ref Range    WBC 7.9 4.0 - 11.0 10e9/L    RBC Count 4.22 3.8 - 5.2 10e12/L    Hemoglobin 13.0 11.7 - 15.7 g/dL    Hematocrit 38.1 35.0 - 47.0 %    MCV 90 78 - 100 fl    MCH 30.8 26.5 - 33.0 pg    MCHC 34.1 31.5 - 36.5 g/dL    RDW 13.0 10.0 - 15.0 %    Platelet Count 254 150 - 450 10e9/L    Diff Method Automated Method     % Neutrophils 56.4 %    % Lymphocytes 29.7 %    % Monocytes 7.8 %    % Eosinophils 4.6 %    % Basophils 0.4 %    % Immature Granulocytes 1.1 %    Nucleated RBCs 0 0 /100    Absolute Neutrophil 4.4 1.6 - 8.3 10e9/L    Absolute Lymphocytes 2.3 0.8 - 5.3 10e9/L    Absolute Monocytes 0.6 0.0 - 1.3 10e9/L    Absolute Eosinophils 0.4 0.0 - 0.7 10e9/L    Absolute Basophils 0.0 0.0 - 0.2 10e9/L    Abs Immature Granulocytes 0.1 0 - 0.4 10e9/L    Absolute Nucleated RBC 0.0    Basic metabolic panel    Collection Time: 12/13/17  8:05 PM   Result Value Ref Range    Sodium 140 133 - 144 mmol/L    Potassium 3.5 3.4 - 5.3 mmol/L    Chloride 107 94 - 109 mmol/L     Carbon Dioxide 25 20 - 32 mmol/L    Anion Gap 8 3 - 14 mmol/L    Glucose 166 (H) 70 - 99 mg/dL    Urea Nitrogen 17 7 - 30 mg/dL    Creatinine 0.69 0.52 - 1.04 mg/dL    GFR Estimate >90 >60 mL/min/1.7m2    GFR Estimate If Black >90 >60 mL/min/1.7m2    Calcium 8.8 8.5 - 10.1 mg/dL   EKG 12-lead, tracing only    Collection Time: 12/14/17  8:36 AM   Result Value Ref Range    Interpretation ECG Click View Image link to view waveform and result           Impression     Abi Mcnair is a 47 year old female with a history of depression, anxiety, and an eating disorder who presented to [facility] beds at the [facility] ED for evaluation and treatment of progressively worsening auditory hallucinations, paranoia, and thoughts of self-harm. Pt has a long history of depression and anxiety, and has a currently inactive eating disorder. She is typically experiencing what appears to be a psychotic depression. She states she does not feel depressed but is hearing voices that are criticizing her and suggesting she kill herself or others. Will begin Latuda 40mg q1800.     Diagnoses     1. Major depressive disorder, recurrent, severe with psychotic features.  2. Anxiety, NOS.  3. Eating disorder, inactive.     Plan     1. Explained side effects, benefits, and complications of medications to the patient, Pt gave verbal consent.  2. Medication changes: Begin Latuda 40mg q1800.   3. Discussed treatment plan with patient and team.  4. Projected length of stay: When pt has been stabilized.        Attestation:   Patient has been seen and evaluated by me, Mauri Fleming MD.    Patient ID:  Name: Abi Mcnair    MRN: [id number]  Admission: 11/8/2017     YOB: 1970

## 2017-12-14 NOTE — PROGRESS NOTES
12/13/17 1948   Patient Belongings   Did you bring any home meds/supplements to the hospital?  No   Patient Belongings other (see comments)   Disposition of Belongings Other (see comment)   Belongings Search Yes   Clothing Search Yes   Second Staff Diana     Pair of Shoes 1  Pair of Socks1  Red Coat 1   Lip Balm 1   Bra 1  Black Long Sleeve T-Shirt 1  Sweat Pants w/Strings 1  Dark Grey Sweater 1  World Market Bag 1  Cell Phone 1  Book 1  Box Apple Juice 1  Glasses w/Case 1    A               Admission:  I am responsible for any personal items that are not sent to the safe or pharmacy.  [facility] is not responsible for loss, theft or damage of any property in my possession.    Signature:  _________________________________ Date: _______  Time: _____                                              Staff Signature:  ____________________________ Date: ________  Time: _____      2nd Staff person, if patient is unable/unwilling to sign:    Signature: ________________________________ Date: ________  Time: _____     Discharge:  [facility] has returned all of my personal belongings:    Signature: _________________________________ Date: ________  Time: _____                                          Staff Signature:  ____________________________ Date: ________  Time: _____

## 2017-12-14 NOTE — CONSULTS
[facility]    Hospitalist Consultation    Date of Admission:  12/13/2017    Assessment & Plan   Abi Mcnair is a 47 year old female who was admitted on 12/13/2017 for evaluation and management of auditory hallucinations with themes of killing or killing herself. She is currently admitted voluntarily to inpatient Adult Mental Health at [facility].    Hallucinations, auditory  Depression, treated  Anxiety, treated  Ms. Mcnair was admitted to [facility] from 11/8/2017 to 11/16/2017 for auditory hallucinations. Hallucinations improved and have now escalated to themes of killing or killing herself. At this time she is asking to speak with Psychiatry for relief. She is calm and cooperative.  - full plan of care per Psychiatry   - she has never had an EKG; QTc within normal limits for dosing of Zyprexa and Seroquel    Constipation, treated  Ms. Mcnair notes she was very constipated during her previous admission and after discharge. She has been taking Miralax daily.  - daily Miralax  - PRN colace  - highly encourage adequate water intake, balanced diet    DVT Prophylaxis: Ambulate every shift  Code Status: Full Code    Disposition: Per Psychiatry    The above assessment and plan has been discussed with Dr. Duque, who is in agreement with the above assessment and plan. The Hospitalist Service will sign-off. Please call us with questions or concerns.    NESSA Levin, CNP  Hospitalist Service, House Officer  [facility]     Text Page  Pager: [phone]    Reason for Consult   Reason for consult: I was asked by Dr. Pereira to evaluate this patient for medical management.    Primary Care Physician     Physician No Ref-Primary    Chief Complaint   Hallucinations, auditory    History is obtained from the patient    History of Present Illness   Abi Mcnair is a 47 year old female who presented to [facility] Emergency Department on 12/13/2017 for evaluation of escalating auditory  "hallucinations. Since discharge from [facility] in November she has continued to have hallucinations, now the hallucinations have escalated to themes of killing, killing herself, and \"watching life leave the body.\" Nothing has relieved her hallucinations yet and she is eager to speak with  Psychiatry this morning.     Emergency Department course included lab work, which was unremarkable. Urine toxicology and urine pregnancy were negative. TSH from November 2017 was normal.    Past Medical History    I personally reviewed history with patient:  - psychosis  - depression  - hallucinations  - anxiety  - insomnia  - menorrhagia, LMP June 2017    Past Surgical History    I personally reviewed history with patient:  - right wrist ganglionic cyst removal    Prior to Admission Medications   Prior to Admission Medications   Prescriptions Last Dose Informant Patient Reported? Taking?   ALPRAZOLAM PO 12/13/2017 at 13:15 Self Yes Yes   Sig: Take 0.5 mg by mouth daily as needed for anxiety   ARIPiprazole (ABILIFY) 10 MG tablet 12/13/2017 at am Self No Yes   Sig: Take 1 tablet (10 mg) by mouth daily   CLONAZEPAM PO 12/13/2017 at x 2 doses Self Yes Yes   Sig: Take 1 mg by mouth 3 times daily   HYDROXYZINE HCL PO 12/13/2017 at am Self Yes Yes   Sig: Take 25 mg by mouth daily as needed (anxiety, panic)    OLANZapine zydis (ZYPREXA) 10 MG ODT tab 12/13/2017 at am Self No Yes   Sig: Take 1 tablet (10 mg) by mouth every 6 hours as needed for agitation   QUEtiapine (SEROQUEL) 100 MG tablet 12/13/2017 at 13:15 Self No Yes   Sig: Take 1 tablet (100 mg) by mouth every 2 hours as needed (Anxiety, depression)   QUEtiapine (SEROQUEL) 400 MG tablet 12/12/2017 at hs Self No Yes   Sig: Take 1 tablet (400 mg) by mouth At Bedtime   VITAMIN D, CHOLECALCIFEROL, PO 12/13/2017 at am Self Yes Yes   Sig: Take 1,000 Units by mouth daily   venlafaxine (EFFEXOR-ER) 150 MG TB24 24 hr tablet 12/13/2017 at am Self Yes Yes   Sig: Take 150 mg by mouth daily (with " "breakfast)      Facility-Administered Medications: None     Allergies   Allergies   Allergen Reactions     Diphenhydramine Hcl      Throat started to close up       Social History   I personally reviewed history with patient:  - lives alone in a [place] apartment  - works as a ,   - lifelong non-smoker  - has 0-5 drinks/month  - denies other drug use    Family History   I personally reviewed history with patient:  - father with untreated depression     Review of Systems   The 10 point Review of Systems is negative other than noted in the HPI or here.     Physical Exam   Nursing Notes Reviewed.  /85  Pulse 88  Temp 97.9  F (36.6  C) (Temporal)  Resp 16  Ht 1.676 m (5' 6\")  Wt 85.1 kg (187 lb 11.2 oz)  SpO2 97%  BMI 30.3 kg/m2     General: Appears stated age, no acute distress.  Skin:  Warm, dry. No rashes or lesions on exposed skin.  HEENT:  Normocephalic, atraumatic.  Chest:  Bilateral anterior and posterior lung fields clear to auscultation. No increased work of breathing. Does not require supplemental oxygen.  Cardiovascular:  Regular rate and rhythm, without murmur, rub, or gallop. Bilateral upper and lower distal pulses palpable.   Abdomen:  Soft, non-tender, non-distended. Bowel sounds present.   Musculoskeletal:  Moves all four extremities.   Neurological:  Alert and oriented x 4. Cranial nerves II-XII grossly intact.   Psychiatric:  Affect and mood congruent. Pleasant and cooperative. No signs of hallucinations.       Data   -Data reviewed today: All pertinent laboratory and imaging results from this encounter were reviewed. I personally reviewed no images or EKG's today.    Recent Labs  Lab 12/13/17 2005   WBC 7.9   HGB 13.0   MCV 90         POTASSIUM 3.5   CHLORIDE 107   CO2 25   BUN 17   CR 0.69   ANIONGAP 8   EVERTON 8.8   *             "

## 2017-12-14 NOTE — PROGRESS NOTES
"Pt woke up around 0600 and she requested a PRN for auditory hallucinations.  Pt said \"theying are saying bad things\", \"they are loud\" and \"they are worse\".  Pt took Zydis 10mg. Staff offered headphones, reading material and a snack to help her distract from the voices, pt declined. Support and reassurance was given.   "

## 2017-12-15 PROCEDURE — 25000132 ZZH RX MED GY IP 250 OP 250 PS 637: Performed by: PSYCHIATRY & NEUROLOGY

## 2017-12-15 PROCEDURE — 25000132 ZZH RX MED GY IP 250 OP 250 PS 637: Performed by: NURSE PRACTITIONER

## 2017-12-15 PROCEDURE — 97150 GROUP THERAPEUTIC PROCEDURES: CPT | Mod: GO

## 2017-12-15 PROCEDURE — 12400006 ZZH R&B MH INTERMEDIATE

## 2017-12-15 RX ORDER — LURASIDONE HYDROCHLORIDE 60 MG/1
60 TABLET, FILM COATED ORAL DAILY
Status: DISCONTINUED | OUTPATIENT
Start: 2017-12-15 | End: 2017-12-18

## 2017-12-15 RX ADMIN — CLONAZEPAM 1 MG: 1 TABLET ORAL at 15:43

## 2017-12-15 RX ADMIN — ACETAMINOPHEN 1000 MG: 500 TABLET, FILM COATED ORAL at 13:39

## 2017-12-15 RX ADMIN — POLYETHYLENE GLYCOL 3350 17 G: 17 POWDER, FOR SOLUTION ORAL at 08:13

## 2017-12-15 RX ADMIN — VENLAFAXINE HYDROCHLORIDE 150 MG: 150 CAPSULE, EXTENDED RELEASE ORAL at 08:13

## 2017-12-15 RX ADMIN — CLONAZEPAM 1 MG: 1 TABLET ORAL at 08:13

## 2017-12-15 RX ADMIN — LURASIDONE HYDROCHLORIDE 60 MG: 60 TABLET, FILM COATED ORAL at 17:36

## 2017-12-15 RX ADMIN — CLONAZEPAM 1 MG: 1 TABLET ORAL at 20:57

## 2017-12-15 RX ADMIN — VITAMIN D, TAB 1000IU (100/BT) 1000 UNITS: 25 TAB at 08:13

## 2017-12-15 RX ADMIN — OLANZAPINE 10 MG: 10 TABLET, ORALLY DISINTEGRATING ORAL at 16:34

## 2017-12-15 RX ADMIN — OLANZAPINE 10 MG: 10 TABLET, ORALLY DISINTEGRATING ORAL at 05:46

## 2017-12-15 ASSESSMENT — ACTIVITIES OF DAILY LIVING (ADL)
GROOMING: HANDWASHING;INDEPENDENT
ORAL_HYGIENE: INDEPENDENT
DRESS: SCRUBS (BEHAVIORAL HEALTH);INDEPENDENT

## 2017-12-15 ASSESSMENT — PAIN DESCRIPTION - DESCRIPTORS: DESCRIPTORS: ACHING

## 2017-12-15 NOTE — PLAN OF CARE
"Problem: Psychotic Symptoms  Goal: Psychotic Symptoms  Signs and symptoms of listed problems will be absent or manageable.   Outcome: No Change  Pt was pleasant and cooperative throughout the afternoon. Attended groups and participated appropriately. Social with staff and peers and presents with a bright affect. Pt stated that she was \"well for about a month after last DC but things fell apart over the last week.\" Pt is hopeful to find a permanent solution to her mental health issues, but is understanding that it may take some time.      "

## 2017-12-15 NOTE — PLAN OF CARE
Problem: Psychotic Symptoms  Intervention: Social and Therapeutic Interv (Psychotic Symptoms)  Pt calm and cooperative. Visible on the unit. Took shower this evening. Pt stated she is hearing voices telling her to kill herself. PRN zyprexa given. Pt states keeping busy helps not focus on the voices. Pt spent time talking with staff then talking to sister on the phone. States to find staff if she feels overwhelmed by voices.

## 2017-12-15 NOTE — PLAN OF CARE
Problem: General Rehab Plan of Care  Goal: Occupational Therapy Goals  The patient and/or their representative will achieve their patient-specific goals related to the plan of care.  The patient-specific goals include:  INITIAL O.T. ASSESSMENT   Details:  Pt participated in two OT groups today. Affect was bright during interactions. In OT group, pt selected, planned, and initiated a moderately complex activity with minimal assistance for problem solving. Pt communicated her needs clearly. Speech was articulate and appropriate to context. Behavior was organized and goal directed. Attention was good during task performance. In Exercise group, pt followed directions easily. Attention was good in structured setting.

## 2017-12-15 NOTE — PROGRESS NOTES
"[facility] Psychiatric Progress Note       Interim History     The patient's care was discussed with the treatment team and chart notes were reviewed. Pt seen on SDU. Tolerating medications without side effects. Side effects, risks, and benefits of medications reviewed with patient. She states she \"could be better.\" She reports that she had \"strong urge to commit suicide\" after taking a shower last night. She did not engage in any SIB. She describes her voices as a \"omalley against myself,\" a sort of cacophony of sounds to her. She states that she has thoughts of herself being swept out to sea and drowning. Plan to increase Latuda to 60mg q1800.     Hospital Course     On 12/14/17, pt was admitted to  and was started on Latuda 40mg q1800. On 12/15/17, pt continued to report auditory hallucinations and suicidal ideation. Latuda was increased to 60mg q1800.     Medications     Current Facility-Administered Medications Ordered in Epic   Medication Dose Route Frequency Last Rate Last Dose     acetaminophen (TYLENOL) tablet 1,000 mg  1,000 mg Oral Q6H PRN         ibuprofen (ADVIL/MOTRIN) tablet 400 mg  400 mg Oral Q6H PRN         polyethylene glycol (MIRALAX/GLYCOLAX) Packet 17 g  17 g Oral Daily   17 g at 12/15/17 0813     docusate sodium (COLACE) capsule 100 mg  100 mg Oral BID PRN         lurasidone (LATUDA) tablet 40 mg  40 mg Oral Daily   40 mg at 12/14/17 1734     clonazePAM (klonoPIN) tablet 1 mg  1 mg Oral TID   1 mg at 12/15/17 0813     OLANZapine zydis (zyPREXA) ODT tab 10 mg  10 mg Oral Q6H PRN   10 mg at 12/15/17 0546     venlafaxine (EFFEXOR-XR) 24 hr capsule 150 mg  150 mg Oral Daily with breakfast   150 mg at 12/15/17 0813     cholecalciferol (vitamin D3) tablet 1,000 Units  1,000 Units Oral Daily   1,000 Units at 12/15/17 0813     No current [facility]-ordered outpatient prescriptions on file.         Allergies      Allergies   Allergen Reactions     Diphenhydramine Hcl      Throat started to " "close up        Medical Review of Systems     /78  Pulse 100  Temp 97.6  F (36.4  C) (Oral)  Resp 16  Ht 1.676 m (5' 6\")  Wt 85.1 kg (187 lb 11.2 oz)  SpO2 97%  BMI 30.3 kg/m2  Body mass index is 30.3 kg/(m^2).  A 10-point review of systems was performed by Mauri Fleming MD and is negative, no new findings.      Psychiatric Examination     Appearance Sitting in chair, dressed in casual clothes. Appears stated age.   Attitude Cooperative   Orientation Oriented to person, place, time   Eye Contact Poor   Speech Clear, coherent, normal prosody   Language Normal   Psychomotor Behavior Normal   Mood Depressed, anxious   Affect Flat   Thought Process Intact   Associations Intact   Thought Content Patient is currently positive for suicide ideation, negative for plan or intent, able to contract no self harm and identify barriers to suicide. Positive for auditory hallucinations.   Fund of Knowledge Average   Insight Poor   Judgement Impaired   Attention Span & Concentration Alert   Recent & Remote Memory Intact   Gait Normal   Muscle Tone Intact        Labs     Labs reviewed.  No results found for this or any previous visit (from the past 24 hour(s)).     Impression     Abi Mcnair is a 47 year old female with a history of depression, anxiety, and an eating disorder who presented to [facility] ED for evaluation and treatment of progressively worsening auditory hallucinations, paranoia, and thoughts of self-harm.      Diagnoses     1. Major depressive disorder, recurrent, severe   2. Anxiety, NOS   3. Eating disorder, inactive      Plan     1. Explained side effects, benefits, and complications of medications to the patient, Pt gave verbal consent.  2. Medication changes: Increase Latuda to 60mg 1800.  3. Discussed treatment plan with patient and team.  4. Projected length of stay: Until pt has been stabilized.      Attestation:   Patient has been seen and evaluated by me, Mauri Fleming, " MD.    Patient ID:  Name: Abi Mcnair    MRN: [id number]  Admission: 11/8/2017     YOB: 1970

## 2017-12-16 PROCEDURE — 12400006 ZZH R&B MH INTERMEDIATE

## 2017-12-16 PROCEDURE — 25000132 ZZH RX MED GY IP 250 OP 250 PS 637: Performed by: PSYCHIATRY & NEUROLOGY

## 2017-12-16 PROCEDURE — 25000132 ZZH RX MED GY IP 250 OP 250 PS 637: Performed by: NURSE PRACTITIONER

## 2017-12-16 RX ORDER — QUETIAPINE FUMARATE 50 MG/1
50-100 TABLET, FILM COATED ORAL EVERY 4 HOURS PRN
Status: DISCONTINUED | OUTPATIENT
Start: 2017-12-16 | End: 2017-12-17

## 2017-12-16 RX ADMIN — QUETIAPINE FUMARATE 100 MG: 50 TABLET, FILM COATED ORAL at 16:07

## 2017-12-16 RX ADMIN — CLONAZEPAM 1 MG: 1 TABLET ORAL at 20:28

## 2017-12-16 RX ADMIN — CLONAZEPAM 1 MG: 1 TABLET ORAL at 07:57

## 2017-12-16 RX ADMIN — POLYETHYLENE GLYCOL 3350 17 G: 17 POWDER, FOR SOLUTION ORAL at 07:57

## 2017-12-16 RX ADMIN — OLANZAPINE 10 MG: 10 TABLET, ORALLY DISINTEGRATING ORAL at 06:27

## 2017-12-16 RX ADMIN — ACETAMINOPHEN 1000 MG: 500 TABLET, FILM COATED ORAL at 16:19

## 2017-12-16 RX ADMIN — LURASIDONE HYDROCHLORIDE 60 MG: 60 TABLET, FILM COATED ORAL at 17:41

## 2017-12-16 RX ADMIN — QUETIAPINE FUMARATE 100 MG: 50 TABLET, FILM COATED ORAL at 20:30

## 2017-12-16 RX ADMIN — ACETAMINOPHEN 1000 MG: 500 TABLET, FILM COATED ORAL at 06:27

## 2017-12-16 RX ADMIN — VITAMIN D, TAB 1000IU (100/BT) 1000 UNITS: 25 TAB at 07:57

## 2017-12-16 RX ADMIN — VENLAFAXINE HYDROCHLORIDE 150 MG: 150 CAPSULE, EXTENDED RELEASE ORAL at 07:57

## 2017-12-16 RX ADMIN — CLONAZEPAM 1 MG: 1 TABLET ORAL at 15:45

## 2017-12-16 ASSESSMENT — ACTIVITIES OF DAILY LIVING (ADL)
DRESS: SCRUBS (BEHAVIORAL HEALTH);INDEPENDENT
GROOMING: HANDWASHING;INDEPENDENT
ORAL_HYGIENE: INDEPENDENT

## 2017-12-16 NOTE — PROGRESS NOTES
[facility] Psychiatric Progress Note       Interim History     On call psychiatrist was asked to see this patient on account of her report of not doing well.  She reports that Zyprexa has not been beneficial.  She states she continues to experience auditory hallucinations that are essentially make her feel unable to concentrate.  She states sometimes she tries to meditate to make her feel bad.  She was initially hoping for a change of medication but was advised to remain on the two doses of dose was just increased yesterday.  She verbalized understanding and agreed to stay her Latuda.  However she was advised that she will be provided with Seroquel in place of Zyprexa to address breakthrough psychosis in between her doses of Latuda.     Hospital Course     On 12/14/17, pt was admitted to  and was started on Latuda 40mg q1800. On 12/15/17, pt continued to report auditory hallucinations and suicidal ideation. Latuda was increased to 60mg q1800.  On 12/16/17 Zyprexa p.r.n. was discontinued and Seroquel 100 mg q.4 hours p.r.n. started.     Medications     Current Facility-Administered Medications Ordered in Epic   Medication Dose Route Frequency Last Rate Last Dose     QUEtiapine (SEROquel) tablet  mg   mg Oral Q4H PRN         lurasidone (LATUDA) tablet 60 mg  60 mg Oral Daily   60 mg at 12/15/17 1736     acetaminophen (TYLENOL) tablet 1,000 mg  1,000 mg Oral Q6H PRN   1,000 mg at 12/16/17 0627     ibuprofen (ADVIL/MOTRIN) tablet 400 mg  400 mg Oral Q6H PRN         polyethylene glycol (MIRALAX/GLYCOLAX) Packet 17 g  17 g Oral Daily   17 g at 12/16/17 0757     docusate sodium (COLACE) capsule 100 mg  100 mg Oral BID PRN         clonazePAM (klonoPIN) tablet 1 mg  1 mg Oral TID   1 mg at 12/16/17 0757     venlafaxine (EFFEXOR-XR) 24 hr capsule 150 mg  150 mg Oral Daily with breakfast   150 mg at 12/16/17 0757     cholecalciferol (vitamin D3) tablet 1,000 Units  1,000 Units Oral Daily   1,000  "Units at 12/16/17 0757     No current Epic-ordered outpatient prescriptions on file.         Allergies      Allergies   Allergen Reactions     Diphenhydramine Hcl      Throat started to close up        Medical Review of Systems     /70  Pulse 71  Temp 97.8  F (36.6  C) (Oral)  Resp 16  Ht 1.676 m (5' 6\")  Wt 85.1 kg (187 lb 11.2 oz)  SpO2 97%  BMI 30.3 kg/m2  Body mass index is 30.3 kg/(m^2).  A 10-point review of systems was performed by Dick Dalton MD and is negative, no new findings.      Psychiatric Examination     Appearance Sitting in chair, dressed in casual clothes. Appears stated age.   Attitude Cooperative   Orientation Oriented to person, place, time   Eye Contact Good    Speech Clear, coherent, normal prosody   Language Normal   Psychomotor Behavior Normal   Mood Depressed, anxious   Affect Flat   Thought Process Intact   Associations Intact   Thought Content Patient is currently positive for suicide ideation, negative for plan or intent, able to contract no self harm and identify barriers to suicide. Positive for auditory hallucinations.   Fund of Knowledge Average   Insight Poor   Judgement Impaired   Attention Span & Concentration Alert   Recent & Remote Memory Intact   Gait Normal   Muscle Tone Intact        Labs     Labs reviewed.  No results found for this or any previous visit (from the past 24 hour(s)).     Impression     Abi Mcnair is a 47 year old female with a history of depression, anxiety, and an eating disorder who presented to [facility] ED for evaluation and treatment of progressively worsening auditory hallucinations, paranoia, and thoughts of self-harm.      Diagnoses     1. Major depressive disorder, recurrent, severe with psychotic features  2. Anxiety, NOS   3. Eating disorder, inactive      Plan     1. Explained side effects, benefits, and complications of medications to the patient, Pt gave verbal consent.  2. Medication changes: Discontinue Zyprexa " and start p.r.n. Seroquel 100 mg q.4 hours p.r.n.  3. Discussed treatment plan with patient and team.  4. Projected length of stay: Until pt has been stabilized.      Attestation:   Patient has been seen and evaluated by Dick morales MD.    Patient ID:  Name: Abi Mcnair    MRN: [id number]  Admission: 11/8/2017     YOB: 1970

## 2017-12-16 NOTE — PLAN OF CARE
Problem: Psychotic Symptoms  Goal: Psychotic Symptoms  Signs and symptoms of listed problems will be absent or manageable.   Outcome: No Change  Patient complaining of hearing voices.  She states that they are loud and tell her the only way to get rid of them is to kill herself.  She stated that the zyprexa is not working. She finds it very frustrating. She spoke with Dr. Dalton and she agreed to let the latuda work.  He also did order seroquel prn which she has not had as of yet.  Visiting with visitors this afternoon.

## 2017-12-16 NOTE — PLAN OF CARE
Problem: Psychotic Symptoms  Goal: Psychotic Symptoms  Signs and symptoms of listed problems will be absent or manageable.   Outcome: No Change  Patient continues to have command auditory hallucinations to harm herself & others.  She is able, however, to contract for safety.  Zyprexa/zydis 10mg was given with some relief obtained.  She is pleasant & cooperative on approach & medication compliant.  Patient attended all milieu activities.

## 2017-12-17 PROCEDURE — 25000132 ZZH RX MED GY IP 250 OP 250 PS 637: Performed by: PSYCHIATRY & NEUROLOGY

## 2017-12-17 PROCEDURE — 25000132 ZZH RX MED GY IP 250 OP 250 PS 637: Performed by: NURSE PRACTITIONER

## 2017-12-17 PROCEDURE — 12400006 ZZH R&B MH INTERMEDIATE

## 2017-12-17 RX ORDER — QUETIAPINE FUMARATE 200 MG/1
200 TABLET, FILM COATED ORAL EVERY 6 HOURS PRN
Status: DISCONTINUED | OUTPATIENT
Start: 2017-12-17 | End: 2017-12-27 | Stop reason: HOSPADM

## 2017-12-17 RX ORDER — QUETIAPINE FUMARATE 200 MG/1
200 TABLET, FILM COATED ORAL EVERY 8 HOURS PRN
Status: DISCONTINUED | OUTPATIENT
Start: 2017-12-17 | End: 2017-12-17

## 2017-12-17 RX ADMIN — QUETIAPINE FUMARATE 200 MG: 200 TABLET, FILM COATED ORAL at 18:36

## 2017-12-17 RX ADMIN — QUETIAPINE FUMARATE 200 MG: 200 TABLET, FILM COATED ORAL at 12:53

## 2017-12-17 RX ADMIN — CLONAZEPAM 1 MG: 1 TABLET ORAL at 07:46

## 2017-12-17 RX ADMIN — CLONAZEPAM 1 MG: 1 TABLET ORAL at 20:21

## 2017-12-17 RX ADMIN — VITAMIN D, TAB 1000IU (100/BT) 1000 UNITS: 25 TAB at 07:47

## 2017-12-17 RX ADMIN — CLONAZEPAM 1 MG: 1 TABLET ORAL at 15:40

## 2017-12-17 RX ADMIN — LURASIDONE HYDROCHLORIDE 60 MG: 60 TABLET, FILM COATED ORAL at 17:28

## 2017-12-17 RX ADMIN — QUETIAPINE FUMARATE 100 MG: 50 TABLET, FILM COATED ORAL at 11:18

## 2017-12-17 RX ADMIN — VENLAFAXINE HYDROCHLORIDE 150 MG: 150 CAPSULE, EXTENDED RELEASE ORAL at 07:46

## 2017-12-17 RX ADMIN — ACETAMINOPHEN 1000 MG: 500 TABLET, FILM COATED ORAL at 09:05

## 2017-12-17 RX ADMIN — QUETIAPINE FUMARATE 100 MG: 50 TABLET, FILM COATED ORAL at 06:59

## 2017-12-17 ASSESSMENT — ACTIVITIES OF DAILY LIVING (ADL)
DRESS: SCRUBS (BEHAVIORAL HEALTH);INDEPENDENT
ORAL_HYGIENE: INDEPENDENT
ORAL_HYGIENE: INDEPENDENT
DRESS: STREET CLOTHES
GROOMING: INDEPENDENT
GROOMING: HANDWASHING;INDEPENDENT

## 2017-12-17 NOTE — PLAN OF CARE
Problem: Psychotic Symptoms  Goal: Psychotic Symptoms  Signs and symptoms of listed problems will be absent or manageable.   Outcome: No Change  Patient was given prn seroquel 100mg for complaint of auditory hallucinations with better relief obtained than from zyprexa/zydis.  She is able to contract for safety.  Patient attended both groups & had several visitors throughout the shift.

## 2017-12-17 NOTE — PLAN OF CARE
Problem: Psychotic Symptoms  Goal: Psychotic Symptoms  Signs and symptoms of listed problems will be absent or manageable.   Outcome: No Change  Patient reports hallucinations are worsening, even one hour after taking 100 mg seroquel this morning. MD increased prn seroquel to 200 mg.

## 2017-12-17 NOTE — PROGRESS NOTES
[facility] Psychiatric Progress Note       Interim History     Patient seen for follow-up on account of persistent intrusive auditory hallucinations.  She states she is able to sleep fairly well at night but experiences severe intrusive and loud auditory hallucinations during the day.  She appeared distraught when reporting the intensity of the voices.  She was offered at least 200 mg 3 times daily.  She slept after the first dose and reported marked reduction in the auditory hallucinations.  Unfortunately, this only lasted about 3 hours resulting in her request for increased frequency of the medication.  She does not endorse any self-harm thoughts or plans.  She is tolerating medications without any side effects.  She was encouraged to discuss these changes with her attending psychiatrist tomorrow.     Hospital Course     On 12/14/17, pt was admitted to  and was started on Latuda 40mg q1800. On 12/15/17, pt continued to report auditory hallucinations and suicidal ideation. Latuda was increased to 60mg q1800.  On 12/16/17 Zyprexa p.r.n. was discontinued and Seroquel 100 mg q.4 hours p.r.n. started.     Medications     Current Facility-Administered Medications Ordered in Epic   Medication Dose Route Frequency Last Rate Last Dose     QUEtiapine (SEROquel) tablet 200 mg  200 mg Oral Q8H PRN         lurasidone (LATUDA) tablet 60 mg  60 mg Oral Daily   60 mg at 12/16/17 1741     acetaminophen (TYLENOL) tablet 1,000 mg  1,000 mg Oral Q6H PRN   1,000 mg at 12/17/17 0905     ibuprofen (ADVIL/MOTRIN) tablet 400 mg  400 mg Oral Q6H PRN         polyethylene glycol (MIRALAX/GLYCOLAX) Packet 17 g  17 g Oral Daily   17 g at 12/16/17 0757     docusate sodium (COLACE) capsule 100 mg  100 mg Oral BID PRN         clonazePAM (klonoPIN) tablet 1 mg  1 mg Oral TID   1 mg at 12/17/17 0746     venlafaxine (EFFEXOR-XR) 24 hr capsule 150 mg  150 mg Oral Daily with breakfast   150 mg at 12/17/17 0746     cholecalciferol  "(vitamin D3) tablet 1,000 Units  1,000 Units Oral Daily   1,000 Units at 12/17/17 0747     No current [facility]-ordered outpatient prescriptions on file.         Allergies      Allergies   Allergen Reactions     Diphenhydramine Hcl      Throat started to close up        Medical Review of Systems     /80  Pulse 82  Temp 97.3  F (36.3  C) (Oral)  Resp 16  Ht 1.676 m (5' 6\")  Wt 85.1 kg (187 lb 11.2 oz)  SpO2 97%  BMI 30.3 kg/m2  Body mass index is 30.3 kg/(m^2).  A 10-point review of systems was performed by Dick Dalton MD and is negative, no new findings.      Psychiatric Examination     Appearance Sitting in chair, dressed in casual clothes. Appears stated age.   Attitude Cooperative   Orientation Oriented to person, place, time   Eye Contact Good    Speech Clear, coherent, normal prosody   Language Normal   Psychomotor Behavior Normal   Mood Distraught   Affect Tense    Thought Process Intact   Associations Intact   Thought Content Patient is currently positive for suicide ideation, negative for plan or intent, able to contract no self harm and identify barriers to suicide. Positive for auditory hallucinations.   Fund of Knowledge Average   Insight Poor   Judgement Impaired   Attention Span & Concentration Alert   Recent & Remote Memory Intact   Gait Normal   Muscle Tone Intact        Labs     Labs reviewed.  No results found for this or any previous visit (from the past 24 hour(s)).     Impression     Abi Mcnair is a 47 year old female with a history of depression, anxiety, and an eating disorder who presented to [facility] ED for evaluation and treatment of progressively worsening auditory hallucinations, paranoia, and thoughts of self-harm.      Diagnoses     1. Major depressive disorder, recurrent, severe with psychotic features  2. Anxiety, NOS   3. Eating disorder, inactive      Plan     1. Explained side effects, benefits, and complications of medications to the patient, Pt gave " verbal consent.  2. Medication changes: Increase Seroquel to 200 mg every 6 hours as needed for acute psychosis.  Continue other medications without changes.  3. Discussed treatment plan with patient and team.  4. Projected length of stay: Until pt has been stabilized.      Attestation:   Patient has been seen and evaluated by Dick morales MD.    Patient ID:  Name: Abi Mcnair    MRN: [id number]  Admission: 11/8/2017     YOB: 1970

## 2017-12-18 LAB — INTERPRETATION ECG - MUSE: NORMAL

## 2017-12-18 PROCEDURE — 25000132 ZZH RX MED GY IP 250 OP 250 PS 637: Performed by: PSYCHIATRY & NEUROLOGY

## 2017-12-18 PROCEDURE — 25000132 ZZH RX MED GY IP 250 OP 250 PS 637: Performed by: NURSE PRACTITIONER

## 2017-12-18 PROCEDURE — 90853 GROUP PSYCHOTHERAPY: CPT

## 2017-12-18 PROCEDURE — 12400006 ZZH R&B MH INTERMEDIATE

## 2017-12-18 PROCEDURE — 97150 GROUP THERAPEUTIC PROCEDURES: CPT | Mod: GO

## 2017-12-18 RX ORDER — LURASIDONE HYDROCHLORIDE 80 MG/1
80 TABLET, FILM COATED ORAL DAILY
Status: DISCONTINUED | OUTPATIENT
Start: 2017-12-18 | End: 2017-12-19

## 2017-12-18 RX ADMIN — VENLAFAXINE HYDROCHLORIDE 150 MG: 150 CAPSULE, EXTENDED RELEASE ORAL at 08:23

## 2017-12-18 RX ADMIN — CLONAZEPAM 1 MG: 1 TABLET ORAL at 15:58

## 2017-12-18 RX ADMIN — CLONAZEPAM 1 MG: 1 TABLET ORAL at 20:36

## 2017-12-18 RX ADMIN — ACETAMINOPHEN 1000 MG: 500 TABLET, FILM COATED ORAL at 21:21

## 2017-12-18 RX ADMIN — QUETIAPINE FUMARATE 200 MG: 200 TABLET, FILM COATED ORAL at 20:36

## 2017-12-18 RX ADMIN — QUETIAPINE FUMARATE 200 MG: 200 TABLET, FILM COATED ORAL at 05:42

## 2017-12-18 RX ADMIN — POLYETHYLENE GLYCOL 3350 17 G: 17 POWDER, FOR SOLUTION ORAL at 08:23

## 2017-12-18 RX ADMIN — LURASIDONE HYDROCHLORIDE 80 MG: 80 TABLET, FILM COATED ORAL at 17:42

## 2017-12-18 RX ADMIN — VITAMIN D, TAB 1000IU (100/BT) 1000 UNITS: 25 TAB at 08:23

## 2017-12-18 RX ADMIN — ACETAMINOPHEN 1000 MG: 500 TABLET, FILM COATED ORAL at 07:01

## 2017-12-18 RX ADMIN — CLONAZEPAM 1 MG: 1 TABLET ORAL at 08:23

## 2017-12-18 RX ADMIN — QUETIAPINE FUMARATE 200 MG: 200 TABLET, FILM COATED ORAL at 13:40

## 2017-12-18 ASSESSMENT — ACTIVITIES OF DAILY LIVING (ADL)
GROOMING: HANDWASHING;INDEPENDENT
DRESS: SCRUBS (BEHAVIORAL HEALTH);INDEPENDENT
ORAL_HYGIENE: INDEPENDENT

## 2017-12-18 NOTE — PLAN OF CARE
Problem: Psychotic Symptoms  Goal: Psychotic Symptoms  Signs and symptoms of listed problems will be absent or manageable.   Outcome: No Change  Patient requested that her 200mg of seroquel be increased to Q6 hours because her auditory hallucinations are 8/10, four hours after last dose.  Better relief was obtained.  Patient was isolative to her room most of shift with various visitors.  She declined pm wrap-up, but did attend OT.  Pleasant & cooperative.

## 2017-12-18 NOTE — PROGRESS NOTES
SPIRITUAL HEALTH SERVICES Progress Note  [facility]     recognized pt in [facility] and sat to talk. Pt had just put in a request to see SH. Pt reports that her medications stopped working again. Pt feels discouraged about this hospitalization, which comes so shortly after her previous one. Pt reported talking with her  about her experiences. Her  told her he could not explain them, but assured her that they are not punishments from God.  also affirmed this with pt. 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 Pt is worried about being hospitalized over [place] and hopes to be able to discharge before then.  provided compassionate listening.  will continue to follow as available.       Bertha Escalante  Chaplain Resident  Pager: [phone]  Office: [phone]

## 2017-12-18 NOTE — PLAN OF CARE
"Problem: Psychotic Symptoms  Goal: Psychotic Symptoms  Signs and symptoms of listed problems will be absent or manageable.   Outcome: Therapy, progress toward functional goals is gradual  Pt is med compliant, attending groups, brief 1:1 r/t voices. Pt said, \"I continue to have voices constantly.\" Pt denies command hallucinations currently. Pt denied SI. Pleasant. Affect blunted. Pt spent some time sitting by the Nursing station, isolative, not social with peers. Visits with staff when initiated by staff.      "

## 2017-12-18 NOTE — PROGRESS NOTES
"[facility] Psychiatric Progress Note       Interim History     The patient's care was discussed with the treatment team and chart notes were reviewed. Pt seen on SDU. Tolerating medications without side effects. Side effects, risks, and benefits of medications reviewed with patient. She was seen this weekend by on-call psychiatrist due to intrusive thoughts and persistent auditory hallucinations. She was started on Seroquel with some benefit. She states she experiences hallucinations when she is tired and \"don't give a crap.\" Discussed increasing Latuda to 80mg q1800.      Hospital Course     On 12/14/17, pt was admitted to  and was started on Latuda 40mg q1800. On 12/15/17, pt continued to report auditory hallucinations and suicidal ideation. Latuda was increased to 60mg q1800.  On 12/16/17 Zyprexa p.r.n. was discontinued and Seroquel 100 mg q.4 hours p.r.n. Started. On 12/17/17, pt continued to appear distraught and reported only temporary relief with Seroquel. On 12/18/17, pt appeared less upset and agitated. She continued to endorse intrusive auditory hallucinations. Latuda was increased to 80mg q1800.     Medications     Current Facility-Administered Medications Ordered in Epic   Medication Dose Route Frequency Last Rate Last Dose     QUEtiapine (SEROquel) tablet 200 mg  200 mg Oral Q6H PRN   200 mg at 12/18/17 0542     lurasidone (LATUDA) tablet 60 mg  60 mg Oral Daily   60 mg at 12/17/17 1728     acetaminophen (TYLENOL) tablet 1,000 mg  1,000 mg Oral Q6H PRN   1,000 mg at 12/18/17 0701     ibuprofen (ADVIL/MOTRIN) tablet 400 mg  400 mg Oral Q6H PRN         polyethylene glycol (MIRALAX/GLYCOLAX) Packet 17 g  17 g Oral Daily   17 g at 12/18/17 0823     docusate sodium (COLACE) capsule 100 mg  100 mg Oral BID PRN         clonazePAM (klonoPIN) tablet 1 mg  1 mg Oral TID   1 mg at 12/18/17 0823     venlafaxine (EFFEXOR-XR) 24 hr capsule 150 mg  150 mg Oral Daily with breakfast   150 mg at " "12/18/17 0823     cholecalciferol (vitamin D3) tablet 1,000 Units  1,000 Units Oral Daily   1,000 Units at 12/18/17 0823     No current [facility]-ordered outpatient prescriptions on file.         Allergies      Allergies   Allergen Reactions     Diphenhydramine Hcl      Throat started to close up        Medical Review of Systems     /74  Pulse 88  Temp 97.5  F (36.4  C) (Oral)  Resp 16  Ht 1.676 m (5' 6\")  Wt 85.1 kg (187 lb 11.2 oz)  SpO2 97%  BMI 30.3 kg/m2  Body mass index is 30.3 kg/(m^2).  A 10-point review of systems was performed by Mauri Fleming MD and is negative, no new findings.      Psychiatric Examination     Appearance Sitting in chair, dressed in casual clothes. Appears stated age.   Attitude Cooperative   Orientation Oriented to person, place, time   Eye Contact Fair   Speech Clear, coherent, normal prosody   Language Normal   Psychomotor Behavior Normal   Mood Less distraught   Affect Tense    Thought Process Intact   Associations Intact   Thought Content Patient is currently positive for suicide ideation, negative for plan or intent, able to contract no self harm and identify barriers to suicide. Positive for auditory hallucinations.   Fund of Knowledge Average   Insight Poor   Judgement Impaired   Attention Span & Concentration Alert   Recent & Remote Memory Intact   Gait Normal   Muscle Tone Intact        Labs     Labs reviewed.  No results found for this or any previous visit (from the past 24 hour(s)).     Impression     Abi Mcnair is a 47 year old female with a history of depression, anxiety, and an eating disorder who presented to [facility] ED for evaluation and treatment of progressively worsening auditory hallucinations, paranoia, and thoughts of self-harm.      Diagnoses     1. Major depressive disorder, recurrent, severe with psychotic features  2. Anxiety, NOS   3. Eating disorder, inactive      Plan     1. Explained side effects, benefits, and complications of " medications to the patient, Pt gave verbal consent.  2. Medication changes: Increase Latuda to 80mg q1800.  3. Discussed treatment plan with patient and team.  4. Projected length of stay: Until pt has been stabilized.      Attestation:   Patient has been seen and evaluated by me, Mauri Fleming MD.    Patient ID:  Name: Abi Mcnair    MRN: [id number]  Admission: 11/8/2017     YOB: 1970

## 2017-12-19 PROCEDURE — 25000132 ZZH RX MED GY IP 250 OP 250 PS 637: Performed by: PSYCHIATRY & NEUROLOGY

## 2017-12-19 PROCEDURE — 12400006 ZZH R&B MH INTERMEDIATE

## 2017-12-19 PROCEDURE — 25000132 ZZH RX MED GY IP 250 OP 250 PS 637: Performed by: NURSE PRACTITIONER

## 2017-12-19 RX ORDER — LURASIDONE HYDROCHLORIDE 120 MG/1
120 TABLET, FILM COATED ORAL DAILY
Status: DISCONTINUED | OUTPATIENT
Start: 2017-12-19 | End: 2017-12-27 | Stop reason: HOSPADM

## 2017-12-19 RX ADMIN — CLONAZEPAM 1 MG: 1 TABLET ORAL at 07:50

## 2017-12-19 RX ADMIN — QUETIAPINE FUMARATE 200 MG: 200 TABLET, FILM COATED ORAL at 07:18

## 2017-12-19 RX ADMIN — LURASIDONE HYDROCHLORIDE 120 MG: 120 TABLET, FILM COATED ORAL at 18:40

## 2017-12-19 RX ADMIN — CLONAZEPAM 1 MG: 1 TABLET ORAL at 16:05

## 2017-12-19 RX ADMIN — CLONAZEPAM 1 MG: 1 TABLET ORAL at 20:32

## 2017-12-19 RX ADMIN — VENLAFAXINE HYDROCHLORIDE 150 MG: 150 CAPSULE, EXTENDED RELEASE ORAL at 07:50

## 2017-12-19 RX ADMIN — VITAMIN D, TAB 1000IU (100/BT) 1000 UNITS: 25 TAB at 07:50

## 2017-12-19 RX ADMIN — QUETIAPINE FUMARATE 200 MG: 200 TABLET, FILM COATED ORAL at 20:33

## 2017-12-19 RX ADMIN — QUETIAPINE FUMARATE 200 MG: 200 TABLET, FILM COATED ORAL at 13:57

## 2017-12-19 RX ADMIN — ACETAMINOPHEN 1000 MG: 500 TABLET, FILM COATED ORAL at 10:15

## 2017-12-19 NOTE — PLAN OF CARE
"Problem: Psychotic Symptoms  Goal: Psychotic Symptoms  Signs and symptoms of listed problems will be absent or manageable.   Outcome: Improving  Patient states that the auditory hallucinations are becoming \"more muffled\".  She states that the increase in seroquel & latuda are helping, slowly\".  She has passive thoughts of suicidal ideation& contracts for safety.  She did not participate in any groups & preferred to isolate in her room with several visitors.      "

## 2017-12-19 NOTE — PLAN OF CARE
Problem: Psychotic Symptoms  Goal: Psychotic Symptoms  Signs and symptoms of listed problems will be absent or manageable.   Outcome: No Change  Pt has been present and pleasant. Attends groups and participates accordingly. Ate most of her meals and can be isolative throughout the day shift. Presents a flat affect, but can be bright upon approach and communication.

## 2017-12-19 NOTE — PROGRESS NOTES
[facility] Psychiatric Progress Note       Interim History     The patient's care was discussed with the treatment team and chart notes were reviewed. Pt seen on SDU. Tolerating medications without side effects. Side effects, risks, and benefits of medications reviewed with patient. She states she started experiencing more voices last night and this morning the voices persisted with a new onset of organ music. She has been taking 600mg of Seroquel PRN along with the increased dose of Latuda. She states she has been on Effexor for the past four months. Discussed increasing Latuda to 120mg q1800. She reported that she recently switched to another provider within [facility] and would like to continue DBT as well.     Hospital Course     On 12/14/17, pt was admitted to  and was started on Latuda 40mg q1800. On 12/15/17, pt continued to report auditory hallucinations and suicidal ideation. Latuda was increased to 60mg q1800.  On 12/16/17 Zyprexa p.r.n. was discontinued and Seroquel 100 mg q.4 hours p.r.n. Started. On 12/17/17, pt continued to appear distraught and reported only temporary relief with Seroquel. On 12/18/17, pt appeared less upset and agitated. She continued to endorse intrusive auditory hallucinations. Latuda was increased to 80mg q1800. On 12/19/17, pt continued to experience auditory hallucinations, voices and organ music. Increased Latuda to 120mg.     Medications     Current Facility-Administered Medications Ordered in Epic   Medication Dose Route Frequency Last Rate Last Dose     lurasidone (LATUDA) tablet 80 mg  80 mg Oral Daily   80 mg at 12/18/17 1742     QUEtiapine (SEROquel) tablet 200 mg  200 mg Oral Q6H PRN   200 mg at 12/19/17 0718     acetaminophen (TYLENOL) tablet 1,000 mg  1,000 mg Oral Q6H PRN   1,000 mg at 12/18/17 2121     ibuprofen (ADVIL/MOTRIN) tablet 400 mg  400 mg Oral Q6H PRN         polyethylene glycol (MIRALAX/GLYCOLAX) Packet 17 g  17 g Oral Daily   17 g at 12/18/17  "0823     docusate sodium (COLACE) capsule 100 mg  100 mg Oral BID PRN         clonazePAM (klonoPIN) tablet 1 mg  1 mg Oral TID   1 mg at 12/19/17 0750     venlafaxine (EFFEXOR-XR) 24 hr capsule 150 mg  150 mg Oral Daily with breakfast   150 mg at 12/19/17 0750     cholecalciferol (vitamin D3) tablet 1,000 Units  1,000 Units Oral Daily   1,000 Units at 12/19/17 0750     No current Epic-ordered outpatient prescriptions on file.         Allergies      Allergies   Allergen Reactions     Diphenhydramine Hcl      Throat started to close up        Medical Review of Systems     /75  Pulse 85  Temp 97.8  F (36.6  C) (Oral)  Resp 16  Ht 1.676 m (5' 6\")  Wt 85.1 kg (187 lb 11.2 oz)  SpO2 97%  BMI 30.3 kg/m2  Body mass index is 30.3 kg/(m^2).  A 10-point review of systems was performed by Mauri Fleming MD and is negative, no new findings.      Psychiatric Examination     Appearance Sitting in chair, dressed in casual clothes. Appears stated age.   Attitude Cooperative   Orientation Oriented to person, place, time   Eye Contact Fair   Speech Clear, coherent, normal prosody   Language Normal   Psychomotor Behavior Normal   Mood Less distraught   Affect Tense    Thought Process Intact   Associations Intact   Thought Content Patient is currently positive for suicide ideation, negative for plan or intent, able to contract no self harm and identify barriers to suicide. Positive for auditory hallucinations.   Fund of Knowledge Average   Insight Poor   Judgement Stagnant   Attention Span & Concentration Alert   Recent & Remote Memory Intact   Gait Normal   Muscle Tone Intact        Labs     Labs reviewed.  No results found for this or any previous visit (from the past 24 hour(s)).     Impression     Abi Mcnair is a 47 year old female with a history of depression, anxiety, and an eating disorder who presented to [facility] ED for evaluation and treatment of progressively worsening auditory hallucinations, " paranoia, and thoughts of self-harm.      Diagnoses     1. Major depressive disorder, recurrent, severe with psychotic features  2. Anxiety, NOS   3. Eating disorder, inactive      Plan     1. Explained side effects, benefits, and complications of medications to the patient, Pt gave verbal consent.  2. Medication changes: Increase Latuda to 120mg q1800.  3. Discussed treatment plan with patient and team.  4. Projected length of stay: Until pt has been stabilized with aftercare in place.      Attestation:   Patient has been seen and evaluated by me, Mauri Fleming MD.    Patient ID:  Name: Abi Mcnair    MRN: [id number]  Admission: 11/8/2017     YOB: 1970

## 2017-12-20 PROCEDURE — 90853 GROUP PSYCHOTHERAPY: CPT

## 2017-12-20 PROCEDURE — 25000132 ZZH RX MED GY IP 250 OP 250 PS 637: Performed by: PSYCHIATRY & NEUROLOGY

## 2017-12-20 PROCEDURE — 97150 GROUP THERAPEUTIC PROCEDURES: CPT | Mod: GO

## 2017-12-20 PROCEDURE — 12400006 ZZH R&B MH INTERMEDIATE

## 2017-12-20 PROCEDURE — 25000132 ZZH RX MED GY IP 250 OP 250 PS 637: Performed by: NURSE PRACTITIONER

## 2017-12-20 RX ADMIN — QUETIAPINE FUMARATE 200 MG: 200 TABLET, FILM COATED ORAL at 13:40

## 2017-12-20 RX ADMIN — LURASIDONE HYDROCHLORIDE 120 MG: 120 TABLET, FILM COATED ORAL at 17:33

## 2017-12-20 RX ADMIN — ACETAMINOPHEN 1000 MG: 500 TABLET, FILM COATED ORAL at 09:19

## 2017-12-20 RX ADMIN — CLONAZEPAM 1 MG: 1 TABLET ORAL at 07:01

## 2017-12-20 RX ADMIN — CLONAZEPAM 1 MG: 1 TABLET ORAL at 20:31

## 2017-12-20 RX ADMIN — QUETIAPINE FUMARATE 200 MG: 200 TABLET, FILM COATED ORAL at 20:32

## 2017-12-20 RX ADMIN — VITAMIN D, TAB 1000IU (100/BT) 1000 UNITS: 25 TAB at 07:01

## 2017-12-20 RX ADMIN — CLONAZEPAM 1 MG: 1 TABLET ORAL at 16:21

## 2017-12-20 RX ADMIN — QUETIAPINE FUMARATE 200 MG: 200 TABLET, FILM COATED ORAL at 07:03

## 2017-12-20 RX ADMIN — VENLAFAXINE HYDROCHLORIDE 150 MG: 150 CAPSULE, EXTENDED RELEASE ORAL at 07:01

## 2017-12-20 ASSESSMENT — PAIN DESCRIPTION - DESCRIPTORS: DESCRIPTORS: HEADACHE

## 2017-12-20 ASSESSMENT — ACTIVITIES OF DAILY LIVING (ADL)
LAUNDRY: WITH SUPERVISION
ORAL_HYGIENE: INDEPENDENT
DRESS: INDEPENDENT
GROOMING: INDEPENDENT

## 2017-12-20 NOTE — PROGRESS NOTES
[facility] Psychiatric Progress Note       Interim History     The patient's care was discussed with the treatment team and chart notes were reviewed. Pt seen on SDU. Tolerating medications without side effects. Side effects, risks, and benefits of medications reviewed with patient. She reports that her voices still persist and interfere with her sleep pattern. She has been hearing traffic noises and loud chatter. She did not tolerate Abilify or Zyprexa. Will continue on Latuda 120mg and observe how she tolerates the medication.     Hospital Course     On 12/14/17, pt was admitted to  and was started on Latuda 40mg q1800. On 12/15/17, pt continued to report auditory hallucinations and suicidal ideation. Latuda was increased to 60mg q1800.  On 12/16/17 Zyprexa p.r.n. was discontinued and Seroquel 100 mg q.4 hours p.r.n. Started. On 12/17/17, pt continued to appear distraught and reported only temporary relief with Seroquel. On 12/18/17, pt appeared less upset and agitated. She continued to endorse intrusive auditory hallucinations. Latuda was increased to 80mg q1800. On 12/19/17, pt continued to experience auditory hallucinations, voices and organ music. Increased Latuda to 120mg. On 12/20/17, pt exhibited auditory hallucinations. She was to continue her current medications.     Medications     Current Facility-Administered Medications Ordered in Epic   Medication Dose Route Frequency Last Rate Last Dose     lurasidone (LATUDA) tablet 120 mg  120 mg Oral Daily   120 mg at 12/19/17 1840     QUEtiapine (SEROquel) tablet 200 mg  200 mg Oral Q6H PRN   200 mg at 12/20/17 0703     acetaminophen (TYLENOL) tablet 1,000 mg  1,000 mg Oral Q6H PRN   1,000 mg at 12/19/17 1015     ibuprofen (ADVIL/MOTRIN) tablet 400 mg  400 mg Oral Q6H PRN         polyethylene glycol (MIRALAX/GLYCOLAX) Packet 17 g  17 g Oral Daily   17 g at 12/18/17 0823     docusate sodium (COLACE) capsule 100 mg  100 mg Oral BID PRN          "clonazePAM (klonoPIN) tablet 1 mg  1 mg Oral TID   1 mg at 12/20/17 0701     venlafaxine (EFFEXOR-XR) 24 hr capsule 150 mg  150 mg Oral Daily with breakfast   150 mg at 12/20/17 0701     cholecalciferol (vitamin D3) tablet 1,000 Units  1,000 Units Oral Daily   1,000 Units at 12/20/17 0701     No current Epic-ordered outpatient prescriptions on file.         Allergies      Allergies   Allergen Reactions     Diphenhydramine Hcl      Throat started to close up        Medical Review of Systems     /71  Pulse 86  Temp 97.5  F (36.4  C) (Oral)  Resp 16  Ht 1.676 m (5' 6\")  Wt 87.5 kg (192 lb 12.8 oz)  SpO2 97%  BMI 31.12 kg/m2  Body mass index is 31.12 kg/(m^2).  A 10-point review of systems was performed by Mauri Fleming MD and is negative, no new findings.      Psychiatric Examination     Appearance Sitting in chair, dressed in casual clothes. Appears stated age.   Attitude Cooperative   Orientation Oriented to person, place, time   Eye Contact Fair   Speech Clear, coherent, normal prosody   Language Normal   Psychomotor Behavior Normal   Mood Remains anxious   Affect Tense    Thought Process Intact   Associations Intact   Thought Content Patient is currently positive for suicide ideation, negative for plan or intent, able to contract no self harm and identify barriers to suicide. Positive for auditory hallucinations.   Fund of Knowledge Average   Insight Poor   Judgement Stagnant   Attention Span & Concentration Fair   Recent & Remote Memory Intact   Gait Normal   Muscle Tone Intact        Labs     Labs reviewed.  No results found for this or any previous visit (from the past 24 hour(s)).     Impression     Abi Mcnair is a 47 year old female with a history of depression, anxiety, and an eating disorder who presented to [facility] ED for evaluation and treatment of progressively worsening auditory hallucinations, paranoia, and thoughts of self-harm.      Diagnoses     1. Major depressive disorder, " recurrent, severe with psychotic features  2. Anxiety, NOS   3. Eating disorder, inactive      Plan     1. Explained side effects, benefits, and complications of medications to the patient, Pt gave verbal consent.  2. Medication changes: Continue current medications.  3. Discussed treatment plan with patient and team.  4. Projected length of stay: Until pt has been stabilized with aftercare in place.      Attestation:   Patient has been seen and evaluated by me, Mauri Fleming MD.    Patient ID:  Name: Abi Mcnair    MRN: [id number]  Admission: 11/8/2017     YOB: 1970

## 2017-12-20 NOTE — PLAN OF CARE
Problem: Psychotic Symptoms  Goal: Psychotic Symptoms  Signs and symptoms of listed problems will be absent or manageable.   Outcome: No Change  Pt presented with a flat affect, but brightened in conversation. Pleasant and cooperative. Spent most of the shift isolative to her room resting in bed, and visiting with family. She was more visible at the end of the shift watching a movie in the lounge with peers. She reported that she tried to meditate and was unsuccessful at first due to intrusive thoughts, sounds, and images, but was able to overcome this by thinking positively. She attended wrap up group and participated appropriately.

## 2017-12-20 NOTE — PLAN OF CARE
Problem: General Rehab Plan of Care  Goal: Occupational Therapy Goals  The patient and/or their representative will achieve their patient-specific goals related to the plan of care.  The patient-specific goals include:  Pt will explore and practice coping skills to reduce and manage stressors in daily life.    Pt participated in OT group today. Affect was appropriate to situation. Pt selected and planned an unfamiliar, complex task. Pt requested minimal assistance for problem solving. She otherwise performed the task independently. Pt socialized appropriately with staff and peers. Pt was reluctant but did attend Life Skills group. Pt was alert and engaged appropriately in the group discussion. She tracked the discussion well. Comments reflected comprehension of concepts discussed. Attention was good in both structured and unstructured settings.

## 2017-12-20 NOTE — PLAN OF CARE
Problem: Psychotic Symptoms  Goal: Psychotic Symptoms  Signs and symptoms of listed problems will be absent or manageable.   Outcome: No Change  Pt presents with a full range affect and calm mood. Attended groups and participated with fair insight. Has been social with peers. Continues to be pleasant and cooperative. Reports no complaints this shift.

## 2017-12-20 NOTE — PLAN OF CARE
Problem: General Plan of Care (Inpatient Behavioral)  Goal: Discharge Planning  Patient consistently declined Process Group on 12/18,12/19 and 12/20/17.

## 2017-12-20 NOTE — PROGRESS NOTES
SPIRITUAL HEALTH SERVICES Progress Note  [facility]    Follow-up visit with pt. Pt stated that she has been able to use visualizations to relax again but that her spiritual guides seem distant. 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 SH and pt talked about the value of self-care and SH suggested the action of holding one's hand over one's heart as a physical experience of self-compassion. Pt practiced this during prayer and found some comfort from it. SH prayed with pt. SH will continue to follow as available.      Bertha Escalante  Chaplain Resident  Pager: [phone]  Office: [phone]

## 2017-12-21 PROCEDURE — 25000132 ZZH RX MED GY IP 250 OP 250 PS 637: Performed by: PSYCHIATRY & NEUROLOGY

## 2017-12-21 PROCEDURE — 12400006 ZZH R&B MH INTERMEDIATE

## 2017-12-21 PROCEDURE — 97150 GROUP THERAPEUTIC PROCEDURES: CPT | Mod: GO

## 2017-12-21 RX ADMIN — VITAMIN D, TAB 1000IU (100/BT) 1000 UNITS: 25 TAB at 08:07

## 2017-12-21 RX ADMIN — CLONAZEPAM 1 MG: 1 TABLET ORAL at 20:51

## 2017-12-21 RX ADMIN — CLONAZEPAM 1 MG: 1 TABLET ORAL at 08:07

## 2017-12-21 RX ADMIN — CLONAZEPAM 1 MG: 1 TABLET ORAL at 15:56

## 2017-12-21 RX ADMIN — LURASIDONE HYDROCHLORIDE 120 MG: 120 TABLET, FILM COATED ORAL at 17:33

## 2017-12-21 RX ADMIN — QUETIAPINE FUMARATE 200 MG: 200 TABLET, FILM COATED ORAL at 10:25

## 2017-12-21 RX ADMIN — VENLAFAXINE HYDROCHLORIDE 150 MG: 150 CAPSULE, EXTENDED RELEASE ORAL at 08:07

## 2017-12-21 NOTE — PROGRESS NOTES
"[facility] Psychiatric Progress Note       Interim History     The patient's care was discussed with the treatment team and chart notes were reviewed. Pt seen on SDU. Tolerating medications without side effects. Side effects, risks, and benefits of medications reviewed with patient. She has been declining groups for the past several days, endorsed to staff that she is \"not in a group mood\" despite seen laughing with another peer. She even convinced another peer not to participate in groups. She continues to endorse auditory hallucinations and responding to them, claiming that the voices are approving of her care. She was encouraged not to respond to her voices as they will likely persist and to instead focus on managing her care. She has completed intake for DBT at [facility] and would like to continue with this once she has discharged.     Hospital Course     On 12/14/17, pt was admitted to  and was started on Latuda 40mg q1800. On 12/15/17, pt continued to report auditory hallucinations and suicidal ideation. Latuda was increased to 60mg q1800.  On 12/16/17 Zyprexa p.r.n. was discontinued and Seroquel 100 mg q.4 hours p.r.n. Started. On 12/17/17, pt continued to appear distraught and reported only temporary relief with Seroquel. On 12/18/17, pt appeared less upset and agitated. She continued to endorse intrusive auditory hallucinations. Latuda was increased to 80mg q1800. On 12/19/17, pt continued to experience auditory hallucinations, voices and organ music. Increased Latuda to 120mg. On 12/20/17, pt exhibited auditory hallucinations. She was to continue her current medications. On 12/21/17, pt had been observed not to be attending groups. She was informed that if she does not participate in programming, she will be discharged AMA. Patient care order placed.     Medications     Current Facility-Administered Medications Ordered in Epic   Medication Dose Route Frequency Last Rate Last Dose     " "lurasidone (LATUDA) tablet 120 mg  120 mg Oral Daily   120 mg at 12/20/17 1733     QUEtiapine (SEROquel) tablet 200 mg  200 mg Oral Q6H PRN   200 mg at 12/21/17 1025     acetaminophen (TYLENOL) tablet 1,000 mg  1,000 mg Oral Q6H PRN   1,000 mg at 12/20/17 0919     ibuprofen (ADVIL/MOTRIN) tablet 400 mg  400 mg Oral Q6H PRN         polyethylene glycol (MIRALAX/GLYCOLAX) Packet 17 g  17 g Oral Daily   17 g at 12/18/17 0823     docusate sodium (COLACE) capsule 100 mg  100 mg Oral BID PRN         clonazePAM (klonoPIN) tablet 1 mg  1 mg Oral TID   1 mg at 12/21/17 0807     venlafaxine (EFFEXOR-XR) 24 hr capsule 150 mg  150 mg Oral Daily with breakfast   150 mg at 12/21/17 0807     cholecalciferol (vitamin D3) tablet 1,000 Units  1,000 Units Oral Daily   1,000 Units at 12/21/17 0807     No current [facility]-ordered outpatient prescriptions on file.         Allergies      Allergies   Allergen Reactions     Diphenhydramine Hcl      Throat started to close up        Medical Review of Systems     /83  Pulse 81  Temp 97.8  F (36.6  C) (Oral)  Resp 16  Ht 1.676 m (5' 6\")  Wt 87.5 kg (192 lb 12.8 oz)  SpO2 97%  BMI 31.12 kg/m2  Body mass index is 31.12 kg/(m^2).  A 10-point review of systems was performed by Mauri Fleming MD and is negative, no new findings.      Psychiatric Examination     Appearance Sitting in chair, dressed in casual clothes. Appears stated age.   Attitude Cooperative, somewhat irritable   Orientation Oriented to person, place, time   Eye Contact Fair   Speech Clear, coherent, normal prosody   Language Normal   Psychomotor Behavior Normal   Mood Remains anxious   Affect Tense    Thought Process Intact   Associations Intact   Thought Content Patient is currently positive for suicide ideation, negative for plan or intent, able to contract no self harm and identify barriers to suicide. Positive for auditory hallucinations.   Fund of Knowledge Average   Insight Poor   Judgement Stagnant "   Attention Span & Concentration Fair   Recent & Remote Memory Intact   Gait Normal   Muscle Tone Intact        Labs     Labs reviewed.  No results found for this or any previous visit (from the past 24 hour(s)).     Impression     Abi Mcnair is a 47 year old female with a history of depression, anxiety, and an eating disorder who presented to [facility] ED for evaluation and treatment of progressively worsening auditory hallucinations, paranoia, and thoughts of self-harm.      Diagnoses     1. Major depressive disorder, recurrent, severe with psychotic features  2. Anxiety, NOS   3. Eating disorder, inactive      Plan     1. Explained side effects, benefits, and complications of medications to the patient, Pt gave verbal consent.  2. Medication changes: Continue current medications.  3. Discussed treatment plan with patient and team.  4. Projected length of stay: Until pt has been stabilized with aftercare in place.  5. Pt to attend all groups otherwise discharge AMA.      Attestation:   Patient has been seen and evaluated by me, Mauri Fleming MD.    Patient ID:  Name: Abi Mcnair    MRN: [id number]  Admission: 11/8/2017     YOB: 1970

## 2017-12-21 NOTE — PLAN OF CARE
"Problem: Psychotic Symptoms  Goal: Psychotic Symptoms  Signs and symptoms of listed problems will be absent or manageable.   Outcome: No Change  Pt reported more \"noise\" today in her head in addition to the \"normal voices\". Calm in the morning but became increasing more anxious as shift went on. Pt stated that she was feeling \"pissed and frustrated\" just about \"stuff in general\". Prn Seroquel given. Visible in lounge and hallway. Pleasant & cooperative when interacting with staff.  Needs to attend groups.      "

## 2017-12-21 NOTE — PLAN OF CARE
Problem: General Plan of Care (Inpatient Behavioral)  Goal: Team Discussion  Team Plan:    Outcome: Improving  BEHAVIORAL TEAM DISCUSSION    Participants: Dr. Fleming, nursing staff, Case Management, Psych associates  Progress: Patient is currently undergoing further stabilization on medications. Patient is required to attend groups and participate in milieu activities per physicians order.   Continued Stay Criteria/Rationale: Further stabilization on Medications  Medical/Physical: N/A  Precautions:   Behavioral Orders   Procedures     Code 1 - Restrict to Unit     Routine Programming     As clinically indicated     Status 15     Every 15 minutes.     Plan: further stabilize patient's mood on current medications  Rationale for change in precautions or plan: Need for further stabilization      Problem: Patient Care Overview  Goal: Team Discussion  Team Plan:    Outcome: Improving  BEHAVIORAL TEAM DISCUSSION    Participants: Dr. Fleming, nursing staff, Case Management, Psych associates  Progress: Patient is currently undergoing further stabilization on medications. Patient is required to attend groups and participate in milieu activities per physicians order.   Continued Stay Criteria/Rationale: Further stabilization on Medications  Medical/Physical: N/A  Precautions:   Behavioral Orders   Procedures     Code 1 - Restrict to Unit     Routine Programming     As clinically indicated     Status 15     Every 15 minutes.     Plan: further stabilize patient's mood on current medications  Rationale for change in precautions or plan: Need for further stabilization

## 2017-12-21 NOTE — PLAN OF CARE
Problem: Psychotic Symptoms  Goal: Psychotic Symptoms  Signs and symptoms of listed problems will be absent or manageable.   Outcome: Improving  Pt visible this shift, pleasant & cooperative. Parents visited, appeared to go well. During 1:1 Pt said her auditory hallucinations are at a 5/10. Sometimes they are at a 10/10 in the AM when she gets up. She is wondering if her effexor is contributing to her auditory hallucinations. Would like to discuss with MD. Also, Pt not sure she needs klonopin 1mg three times per day. Pt said she may skip 1600 klonopin tomorrow. Would like to discuss with MD. Pt feels latuda and seroquel are helpful.

## 2017-12-22 PROCEDURE — 25000132 ZZH RX MED GY IP 250 OP 250 PS 637: Performed by: PSYCHIATRY & NEUROLOGY

## 2017-12-22 PROCEDURE — 25000132 ZZH RX MED GY IP 250 OP 250 PS 637: Performed by: NURSE PRACTITIONER

## 2017-12-22 PROCEDURE — 97150 GROUP THERAPEUTIC PROCEDURES: CPT | Mod: GO

## 2017-12-22 PROCEDURE — 12400006 ZZH R&B MH INTERMEDIATE

## 2017-12-22 RX ADMIN — VITAMIN D, TAB 1000IU (100/BT) 1000 UNITS: 25 TAB at 08:19

## 2017-12-22 RX ADMIN — VENLAFAXINE HYDROCHLORIDE 150 MG: 150 CAPSULE, EXTENDED RELEASE ORAL at 08:19

## 2017-12-22 RX ADMIN — QUETIAPINE FUMARATE 200 MG: 200 TABLET, FILM COATED ORAL at 05:23

## 2017-12-22 RX ADMIN — CLONAZEPAM 1 MG: 1 TABLET ORAL at 20:47

## 2017-12-22 RX ADMIN — CLONAZEPAM 1 MG: 1 TABLET ORAL at 15:31

## 2017-12-22 RX ADMIN — ACETAMINOPHEN 1000 MG: 500 TABLET, FILM COATED ORAL at 13:25

## 2017-12-22 RX ADMIN — QUETIAPINE FUMARATE 200 MG: 200 TABLET, FILM COATED ORAL at 13:04

## 2017-12-22 RX ADMIN — QUETIAPINE FUMARATE 200 MG: 200 TABLET, FILM COATED ORAL at 19:18

## 2017-12-22 RX ADMIN — LURASIDONE HYDROCHLORIDE 120 MG: 120 TABLET, FILM COATED ORAL at 17:37

## 2017-12-22 RX ADMIN — CLONAZEPAM 1 MG: 1 TABLET ORAL at 08:19

## 2017-12-22 ASSESSMENT — ACTIVITIES OF DAILY LIVING (ADL)
GROOMING: INDEPENDENT
ORAL_HYGIENE: INDEPENDENT
LAUNDRY: WITH SUPERVISION
DRESS: INDEPENDENT

## 2017-12-22 NOTE — PLAN OF CARE
Problem: General Plan of Care (Inpatient Behavioral)  Goal: Discharge Planning  Patient declined Process Group on Thursday 12/21/17, but did attend Process Group today. She participated appropriately and exhibited fair insight into the topic of discussion today.

## 2017-12-22 NOTE — PROGRESS NOTES
[facility] Psychiatric Progress Note       Interim History     The patient's care was discussed with the treatment team and chart notes were reviewed. Pt seen on SDU. Tolerating medications without side effects. Side effects, risks, and benefits of medications reviewed with patient. Pt stated that she was initially upset from the visit with MD yesterday regarding her participation and responding to her hallucinations. However, she states she is now appreciative of the confrontation and looking forward to continuing her recovery. She will remain over the weekend as it would not be advisable for her to return home alone for [place]. She has been utilizing Seroquel PRN for her hallucinations.     Hospital Course     On 12/14/17, pt was admitted to  and was started on Latuda 40mg q1800. On 12/15/17, pt continued to report auditory hallucinations and suicidal ideation. Latuda was increased to 60mg q1800.  On 12/16/17 Zyprexa p.r.n. was discontinued and Seroquel 100 mg q.4 hours p.r.n. Started. On 12/17/17, pt continued to appear distraught and reported only temporary relief with Seroquel. On 12/18/17, pt appeared less upset and agitated. She continued to endorse intrusive auditory hallucinations. Latuda was increased to 80mg q1800. On 12/19/17, pt continued to experience auditory hallucinations, voices and organ music. Increased Latuda to 120mg. On 12/20/17, pt exhibited auditory hallucinations. She was to continue her current medications. On 12/21/17, pt had been observed not to be attending groups. She was informed that if she does not participate in programming, she will be discharged AMA. Patient care order placed. On 12/22/17, pt was observed to be attending groups and appreciated the conversation regarding her behavior.      Medications     Current Facility-Administered Medications Ordered in Epic   Medication Dose Route Frequency Last Rate Last Dose     lurasidone (LATUDA) tablet 120 mg  120  "mg Oral Daily   120 mg at 12/21/17 1733     QUEtiapine (SEROquel) tablet 200 mg  200 mg Oral Q6H PRN   200 mg at 12/22/17 0523     acetaminophen (TYLENOL) tablet 1,000 mg  1,000 mg Oral Q6H PRN   1,000 mg at 12/20/17 0919     ibuprofen (ADVIL/MOTRIN) tablet 400 mg  400 mg Oral Q6H PRN         polyethylene glycol (MIRALAX/GLYCOLAX) Packet 17 g  17 g Oral Daily   17 g at 12/18/17 0823     docusate sodium (COLACE) capsule 100 mg  100 mg Oral BID PRN         clonazePAM (klonoPIN) tablet 1 mg  1 mg Oral TID   1 mg at 12/22/17 0819     venlafaxine (EFFEXOR-XR) 24 hr capsule 150 mg  150 mg Oral Daily with breakfast   150 mg at 12/22/17 0819     cholecalciferol (vitamin D3) tablet 1,000 Units  1,000 Units Oral Daily   1,000 Units at 12/22/17 0819     No current Epic-ordered outpatient prescriptions on file.         Allergies      Allergies   Allergen Reactions     Diphenhydramine Hcl      Throat started to close up        Medical Review of Systems     /77  Pulse 88  Temp 97.5  F (36.4  C) (Oral)  Resp 16  Ht 1.676 m (5' 6\")  Wt 87.5 kg (192 lb 12.8 oz)  SpO2 97%  BMI 31.12 kg/m2  Body mass index is 31.12 kg/(m^2).  A 10-point review of systems was performed by Mauri Fleming MD and is negative, no new findings.      Psychiatric Examination     Appearance Sitting in chair, dressed in casual clothes. Appears stated age.   Attitude Cooperative   Orientation Oriented to person, place, time   Eye Contact Fair   Speech Clear, coherent, normal prosody   Language Normal   Psychomotor Behavior Normal   Mood Slightly less anxious   Affect Broader range   Thought Process Intact   Associations Intact   Thought Content Patient is currently positive for suicide ideation, negative for plan or intent, able to contract no self harm and identify barriers to suicide. Positive for auditory hallucinations.   Fund of Knowledge Average   Insight Poor   Judgement Slightly improved   Attention Span & Concentration Fair   Recent " & Remote Memory Intact   Gait Normal   Muscle Tone Intact        Labs     Labs reviewed.  No results found for this or any previous visit (from the past 24 hour(s)).     Impression     Abi Mcnair is a 47 year old female with a history of depression, anxiety, and an eating disorder who presented to [facility] ED for evaluation and treatment of progressively worsening auditory hallucinations, paranoia, and thoughts of self-harm.      Diagnoses     1. Major depressive disorder, recurrent, severe with psychotic features  2. Anxiety, NOS   3. Eating disorder, inactive      Plan     1. Explained side effects, benefits, and complications of medications to the patient, Pt gave verbal consent.  2. Medication changes: Continue current medications.  3. Discussed treatment plan with patient and team.  4. Projected length of stay: Until pt has been stabilized with aftercare in place.  5. Pt to attend all groups otherwise discharge AMA.      Attestation:   Patient has been seen and evaluated by me, Mauri Fleming MD.    Patient ID:  Name: Abi Mcnair    MRN: [id number]  Admission: 11/8/2017     YOB: 1970

## 2017-12-22 NOTE — PLAN OF CARE
"Problem: Psychotic Symptoms  Goal: Psychotic Symptoms  Signs and symptoms of listed problems will be absent or manageable.   Outcome: No Change  Flat, but brightens with conversation, reports less anxiety but at times feels agitated because she has \"all sorts of thoughts swimming in her head\". Gave pt. notebook to help put thoughts on paper. She attended all groups and participated appropriately.      "

## 2017-12-22 NOTE — PLAN OF CARE
"Problem: Psychotic Symptoms  Goal: Psychotic Symptoms  Signs and symptoms of listed problems will be absent or manageable.   Outcome: Improving  Patient states that she has felt tense & anxious today  because she got scolded by Dr. WILLIAMSON  \"I realize that I need to attend groups, but, sometimes the noise makes my voices worse\".  She was going to refuse scheduled clonazepam, but was encouraged to take it.  Good relief was obtained as a result.  She denies any suicidal ideation or thoughts of self-harm.  The auditory hallucinations remain present, but somewhat less.  Latuda  Was increased to 120mg today.  She made a good effort to attend all groups & requested all visitors for today.      "

## 2017-12-22 NOTE — PROGRESS NOTES
"SPIRITUAL HEALTH SERVICES Progress Note  [facility]    Brief follow-up visit with pt.  reported to pt that  requested Father Clark to bring her communion this evening, as pt had expressed a desire to receive communion but the [employer] volunteers were not able to visit her. Pt appreciated this.  had talked with pt about a book by Ronny Chaparro, \"[employer],\" and brought pt a quote. Pt expressed her sadness over being in the hospital over Christmas but was glad that her family would come to visit New Year's [place]. Pt stated her desire to have a  visit to pray with her over the weekend and on Christmas day.  told pt that she could request that support.  prayed with pt.  will continue to follow      Bertha Escalante  Chaplain Resident  Pager: [phone]  Office: [phone]  "

## 2017-12-23 PROCEDURE — 25000132 ZZH RX MED GY IP 250 OP 250 PS 637: Performed by: PSYCHIATRY & NEUROLOGY

## 2017-12-23 PROCEDURE — 12400006 ZZH R&B MH INTERMEDIATE

## 2017-12-23 RX ADMIN — CLONAZEPAM 1 MG: 1 TABLET ORAL at 07:47

## 2017-12-23 RX ADMIN — VENLAFAXINE HYDROCHLORIDE 150 MG: 150 CAPSULE, EXTENDED RELEASE ORAL at 07:47

## 2017-12-23 RX ADMIN — CLONAZEPAM 1 MG: 1 TABLET ORAL at 20:27

## 2017-12-23 RX ADMIN — CLONAZEPAM 1 MG: 1 TABLET ORAL at 16:14

## 2017-12-23 RX ADMIN — QUETIAPINE FUMARATE 200 MG: 200 TABLET, FILM COATED ORAL at 07:50

## 2017-12-23 RX ADMIN — VITAMIN D, TAB 1000IU (100/BT) 1000 UNITS: 25 TAB at 07:47

## 2017-12-23 RX ADMIN — LURASIDONE HYDROCHLORIDE 120 MG: 120 TABLET, FILM COATED ORAL at 17:39

## 2017-12-23 RX ADMIN — QUETIAPINE FUMARATE 200 MG: 200 TABLET, FILM COATED ORAL at 15:17

## 2017-12-23 NOTE — PLAN OF CARE
Problem: Psychotic Symptoms  Goal: Psychotic Symptoms  Signs and symptoms of listed problems will be absent or manageable.   Outcome: No Change  Patient presents with a flat affect, and brightens upon approach.  She attended groups and participated appropriately. At around 11 AM pt reported to a nurse that Seroquel is not helping her with her anxiety today. She also reported the voices are really loud. Nursing staff encourage her other ways to help cope with her anxiety including: aroma therapy and walking. 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 She took a shower today and has been visible in the lounge for most of the shift watching TV and social with her peers.

## 2017-12-23 NOTE — PLAN OF CARE
"Problem: Psychotic Symptoms  Goal: Psychotic Symptoms  Signs and symptoms of listed problems will be absent or manageable.     Pt presents with a flat affect but brightens upon approach.  Pt attended all groups and participated appropriately.  Her sister and parents visited today and pt reported that she enjoyed spending time with them.  Pt reports having a better mood today than yesterday, but stated that she felt \"agitated\" early in the shift and that Seroquel helped her to calm down. Pt still endorses hearing voices.  Denies SI.         "

## 2017-12-24 PROCEDURE — 25000132 ZZH RX MED GY IP 250 OP 250 PS 637: Performed by: PSYCHIATRY & NEUROLOGY

## 2017-12-24 PROCEDURE — 25000132 ZZH RX MED GY IP 250 OP 250 PS 637: Performed by: NURSE PRACTITIONER

## 2017-12-24 PROCEDURE — 12400006 ZZH R&B MH INTERMEDIATE

## 2017-12-24 RX ADMIN — QUETIAPINE FUMARATE 200 MG: 200 TABLET, FILM COATED ORAL at 08:28

## 2017-12-24 RX ADMIN — VENLAFAXINE HYDROCHLORIDE 150 MG: 150 CAPSULE, EXTENDED RELEASE ORAL at 08:25

## 2017-12-24 RX ADMIN — CLONAZEPAM 1 MG: 1 TABLET ORAL at 08:25

## 2017-12-24 RX ADMIN — POLYETHYLENE GLYCOL 3350 17 G: 17 POWDER, FOR SOLUTION ORAL at 08:26

## 2017-12-24 RX ADMIN — VITAMIN D, TAB 1000IU (100/BT) 1000 UNITS: 25 TAB at 08:25

## 2017-12-24 RX ADMIN — CLONAZEPAM 1 MG: 1 TABLET ORAL at 15:52

## 2017-12-24 RX ADMIN — LURASIDONE HYDROCHLORIDE 120 MG: 120 TABLET, FILM COATED ORAL at 17:23

## 2017-12-24 RX ADMIN — QUETIAPINE FUMARATE 200 MG: 200 TABLET, FILM COATED ORAL at 14:34

## 2017-12-24 RX ADMIN — ACETAMINOPHEN 1000 MG: 500 TABLET, FILM COATED ORAL at 15:52

## 2017-12-24 RX ADMIN — CLONAZEPAM 1 MG: 1 TABLET ORAL at 20:51

## 2017-12-24 RX ADMIN — ACETAMINOPHEN 1000 MG: 500 TABLET, FILM COATED ORAL at 08:57

## 2017-12-24 NOTE — PLAN OF CARE
Problem: Psychotic Symptoms  Goal: Psychotic Symptoms  Signs and symptoms of listed problems will be absent or manageable.   Outcome: No Change  Patient showered this AM, attended spirituality group and community meeting. Reports she is still having auditory umanzor that get really loud. Reports she is working on her anxiety by trying to be mindful today, using essential oils and was observed walking with a peer up and down the hallway. Appears bright in affect and calm in mood. Visited with two friends and a dog.

## 2017-12-24 NOTE — PLAN OF CARE
Problem: Psychotic Symptoms  Goal: Psychotic Symptoms  Signs and symptoms of listed problems will be absent or manageable.   Outcome: No Change  Pt was present and social at beginning of shift. Family visited and it appeared to go well. Showered this shift. Full range affect. Declined groups and remained in room majority of shift. Pleasant and cooperative. Denies SI, still hearing voices but states they are less prevalent.

## 2017-12-25 PROCEDURE — 25000132 ZZH RX MED GY IP 250 OP 250 PS 637: Performed by: PSYCHIATRY & NEUROLOGY

## 2017-12-25 PROCEDURE — 12400006 ZZH R&B MH INTERMEDIATE

## 2017-12-25 PROCEDURE — 25000132 ZZH RX MED GY IP 250 OP 250 PS 637: Performed by: NURSE PRACTITIONER

## 2017-12-25 PROCEDURE — 90853 GROUP PSYCHOTHERAPY: CPT

## 2017-12-25 RX ADMIN — VITAMIN D, TAB 1000IU (100/BT) 1000 UNITS: 25 TAB at 08:33

## 2017-12-25 RX ADMIN — ACETAMINOPHEN 1000 MG: 500 TABLET, FILM COATED ORAL at 21:41

## 2017-12-25 RX ADMIN — LURASIDONE HYDROCHLORIDE 120 MG: 120 TABLET, FILM COATED ORAL at 18:07

## 2017-12-25 RX ADMIN — CLONAZEPAM 1 MG: 1 TABLET ORAL at 15:48

## 2017-12-25 RX ADMIN — ACETAMINOPHEN 1000 MG: 500 TABLET, FILM COATED ORAL at 05:47

## 2017-12-25 RX ADMIN — CLONAZEPAM 1 MG: 1 TABLET ORAL at 08:33

## 2017-12-25 RX ADMIN — QUETIAPINE FUMARATE 200 MG: 200 TABLET, FILM COATED ORAL at 13:59

## 2017-12-25 RX ADMIN — CLONAZEPAM 1 MG: 1 TABLET ORAL at 21:41

## 2017-12-25 RX ADMIN — QUETIAPINE FUMARATE 200 MG: 200 TABLET, FILM COATED ORAL at 05:47

## 2017-12-25 RX ADMIN — VENLAFAXINE HYDROCHLORIDE 150 MG: 150 CAPSULE, EXTENDED RELEASE ORAL at 08:33

## 2017-12-25 ASSESSMENT — ACTIVITIES OF DAILY LIVING (ADL)
DRESS: INDEPENDENT
GROOMING: INDEPENDENT
ORAL_HYGIENE: INDEPENDENT
LAUNDRY: WITH SUPERVISION
GROOMING: INDEPENDENT
DRESS: INDEPENDENT
ORAL_HYGIENE: INDEPENDENT

## 2017-12-25 NOTE — PLAN OF CARE
Problem: Psychotic Symptoms  Goal: Psychotic Symptoms  Signs and symptoms of listed problems will be absent or manageable.   Outcome: No Change  Pt appears to be in a pleasant mood today. She stated she was having a lot of anxiety this morning but has gotten better throughout the day. Pt also stated that her hallucination and auditory has improved after her morning meds. She attended group this am and has been in the loundge watching tv all morning. Denies suicidal ideations.

## 2017-12-25 NOTE — PLAN OF CARE
Problem: General Plan of Care (Inpatient Behavioral)  Goal: Individualization/Patient Specific Goal (IP Behavioral)  The patient and/or their representative will achieve their patient-specific goals related to the plan of care.    The patient-specific goals include:     1. Reality orientation.  2. Effective medication regime with patient compliance.  3. Stabilization of mood and thought processes.  4. Improved insight into mental health issues.  5. Able to identify and utilize positive coping skills.  6. Plan in place for ongoing treatment and support.     Outcome: No Change  1-1 was done. Patient stated that still experience both visual and auditory hallucinations, more so a party chattering noises. She also said that feels more anxious this shift. She had her KlonoPin per order.   Reports: improvement in thought process. She states that when first started the meds, she was feeling foggy, but now says her mind feel clear. Practicing the mindful exercise in the group activity was very helpful per patient.   She has been visible in the lounge and socializes. Continue to monitor.

## 2017-12-25 NOTE — PLAN OF CARE
"Problem: Psychotic Symptoms  Goal: Psychotic Symptoms  Signs and symptoms of listed problems will be absent or manageable.   Outcome: Improving  Pleasant, cooperative, and social. Enjoyed having her family visit and bring dinner.  States she is \"pretty good.\"  She is concerned that she feels safe here but is unsure what it will be like when she does home.  Says she does not want to go home and come back again.  She also does not want to fall into old habits.  She has been working on mindfulness and meditation.  Cont to c/o voices ans unpleasant imagery; however, Seroquel has helped with this.  She is hoping to D/C next Tuesday or Wednesday.  She is looking forward to going to her home and resuming her life.  Feels she has gained the tools she needs to be successful.  Appearing to be improving.      "

## 2017-12-26 PROCEDURE — 97150 GROUP THERAPEUTIC PROCEDURES: CPT | Mod: GO

## 2017-12-26 PROCEDURE — 25000132 ZZH RX MED GY IP 250 OP 250 PS 637: Performed by: PSYCHIATRY & NEUROLOGY

## 2017-12-26 PROCEDURE — 25000132 ZZH RX MED GY IP 250 OP 250 PS 637: Performed by: NURSE PRACTITIONER

## 2017-12-26 PROCEDURE — 12400006 ZZH R&B MH INTERMEDIATE

## 2017-12-26 PROCEDURE — 90853 GROUP PSYCHOTHERAPY: CPT

## 2017-12-26 RX ADMIN — CLONAZEPAM 1 MG: 1 TABLET ORAL at 08:21

## 2017-12-26 RX ADMIN — VENLAFAXINE HYDROCHLORIDE 150 MG: 150 CAPSULE, EXTENDED RELEASE ORAL at 08:21

## 2017-12-26 RX ADMIN — CLONAZEPAM 1 MG: 1 TABLET ORAL at 20:09

## 2017-12-26 RX ADMIN — CLONAZEPAM 1 MG: 1 TABLET ORAL at 15:48

## 2017-12-26 RX ADMIN — QUETIAPINE FUMARATE 200 MG: 200 TABLET, FILM COATED ORAL at 20:09

## 2017-12-26 RX ADMIN — QUETIAPINE FUMARATE 200 MG: 200 TABLET, FILM COATED ORAL at 06:50

## 2017-12-26 RX ADMIN — QUETIAPINE FUMARATE 200 MG: 200 TABLET, FILM COATED ORAL at 13:05

## 2017-12-26 RX ADMIN — ACETAMINOPHEN 1000 MG: 500 TABLET, FILM COATED ORAL at 06:51

## 2017-12-26 RX ADMIN — LURASIDONE HYDROCHLORIDE 120 MG: 120 TABLET, FILM COATED ORAL at 18:30

## 2017-12-26 RX ADMIN — VITAMIN D, TAB 1000IU (100/BT) 1000 UNITS: 25 TAB at 08:21

## 2017-12-26 RX ADMIN — POLYETHYLENE GLYCOL 3350 17 G: 17 POWDER, FOR SOLUTION ORAL at 08:21

## 2017-12-26 ASSESSMENT — ACTIVITIES OF DAILY LIVING (ADL)
ORAL_HYGIENE: INDEPENDENT
GROOMING: HANDWASHING;SHOWER;INDEPENDENT
DRESS: SCRUBS (BEHAVIORAL HEALTH);INDEPENDENT

## 2017-12-26 NOTE — PLAN OF CARE
"Problem: General Plan of Care (Inpatient Behavioral)  Goal: Individualization/Patient Specific Goal (IP Behavioral)  The patient and/or their representative will achieve their patient-specific goals related to the plan of care.    The patient-specific goals include:     1. Reality orientation.  2. Effective medication regime with patient compliance.  3. Stabilization of mood and thought processes.  4. Improved insight into mental health issues.  5. Able to identify and utilize positive coping skills.  6. Plan in place for ongoing treatment and support.     Pt was present and social with peers and staff, continues to hear \"chattering voices\" and hallucinates while she's meditating but Pt believes that Seroquel is helping to diminish the perceptional alterations which she has received twice PRN at 0700 and 1300 in addition to scheduled Klonopin. Pt is currently in group and appears to be actively participating. Will continue to monitor the severity of the voices.      "

## 2017-12-26 NOTE — PLAN OF CARE
Problem: General Plan of Care (Inpatient Behavioral)  Goal: Team Discussion  Team Plan:    Outcome: Improving  BEHAVIORAL TEAM DISCUSSION    Participants: Dr. Fleming, , Nursing staff, Psych associates  Progress: Patient will be having a family meeting tomorrow at 8:30am and likely discharging tomorrow. Patient has DBT appointment set up for Thursday. Patient denies suicidal ideation or thoughts of self harm.   Continued Stay Criteria/Rationale: Family meeting tomorrow at 8:30am to discuss discharge plans  Medical/Physical: n/a  Precautions:   Behavioral Orders   Procedures     Code 1 - Restrict to Unit     Routine Programming     As clinically indicated     Status 15     Every 15 minutes.     Plan: Family meeting at 0830 and likely discharge tomorrow afternoon  Rationale for change in precautions or plan: patient stabilization has improved significantly. Family would requested a meeting to discharge safe discharge with the psychiatrist.      Problem: Patient Care Overview  Goal: Team Discussion  Team Plan:    Outcome: Improving  BEHAVIORAL TEAM DISCUSSION    Participants: Dr. Fleming, , Nursing staff, Psych associates  Progress: Patient will be having a family meeting tomorrow at 8:30am and likely discharging tomorrow. Patient has DBT appointment set up for Thursday. Patient denies suicidal ideation or thoughts of self harm.   Continued Stay Criteria/Rationale: Family meeting tomorrow at 8:30am to discuss discharge plans  Medical/Physical: n/a  Precautions:   Behavioral Orders   Procedures     Code 1 - Restrict to Unit     Routine Programming     As clinically indicated     Status 15     Every 15 minutes.     Plan: Family meeting at 0830 and likely discharge tomorrow afternoon  Rationale for change in precautions or plan: patient stabilization has improved significantly. Family would requested a meeting to discharge safe discharge with the psychiatrist.

## 2017-12-26 NOTE — PLAN OF CARE
Problem: General Rehab Plan of Care  Goal: Occupational Therapy Goals  The patient and/or their representative will achieve their patient-specific goals related to the plan of care.  The patient-specific goals include:  Pt will explore and practice coping skills to reduce and manage stressors in daily life.    Pt participated in two OT groups today. Affect was superficially bright. In OT group, pt engaged in a simple task independently. Behavior was organized and appropriate to context. Pt socialized with peers easily. In Exercise group, pt engaged in the group activity without difficulty. She followed directions and focused easily.

## 2017-12-26 NOTE — PROGRESS NOTES
[facility] Psychiatric Progress Note       Interim History     The patient's care was discussed with the treatment team and chart notes were reviewed. Pt seen on SDU. Tolerating medications without side effects. Side effects, risks, and benefits of medications reviewed with patient. She continues to experience loud voices when she wakes up in the morning. She states she attempts to practice mindfulness and meditate for 5-10 minutes until she is frustrated. She is unable to concentrate properly due to her hallucinations. She has been taking Seroquel in the morning and afternoon. She states that it allows her to decrease her agitation. Encouraged her to take Seroquel more often, up to tid. She has DBT scheduled for Thursday.     Hospital Course     On 12/14/17, pt was admitted to  and was started on Latuda 40mg q1800. On 12/15/17, pt continued to report auditory hallucinations and suicidal ideation. Latuda was increased to 60mg q1800.  On 12/16/17 Zyprexa p.r.n. was discontinued and Seroquel 100 mg q.4 hours p.r.n. Started. On 12/17/17, pt continued to appear distraught and reported only temporary relief with Seroquel. On 12/18/17, pt appeared less upset and agitated. She continued to endorse intrusive auditory hallucinations. Latuda was increased to 80mg q1800. On 12/19/17, pt continued to experience auditory hallucinations, voices and organ music. Increased Latuda to 120mg. On 12/20/17, pt exhibited auditory hallucinations. She was to continue her current medications. On 12/21/17, pt had been observed not to be attending groups. She was informed that if she does not participate in programming, she will be discharged AMA. Patient care order placed. On 12/22/17, pt was observed to be attending groups and appreciated the conversation regarding her behavior. On 12/26/17, pt continued to experience visual and auditory hallucinations. She was encouraged to take more Seroquel as it was beneficial.      "Medications     Current Facility-Administered Medications Ordered in Epic   Medication Dose Route Frequency Last Rate Last Dose     lurasidone (LATUDA) tablet 120 mg  120 mg Oral Daily   120 mg at 12/25/17 1807     QUEtiapine (SEROquel) tablet 200 mg  200 mg Oral Q6H PRN   200 mg at 12/26/17 0650     acetaminophen (TYLENOL) tablet 1,000 mg  1,000 mg Oral Q6H PRN   1,000 mg at 12/26/17 0651     ibuprofen (ADVIL/MOTRIN) tablet 400 mg  400 mg Oral Q6H PRN         polyethylene glycol (MIRALAX/GLYCOLAX) Packet 17 g  17 g Oral Daily   17 g at 12/26/17 0821     docusate sodium (COLACE) capsule 100 mg  100 mg Oral BID PRN         clonazePAM (klonoPIN) tablet 1 mg  1 mg Oral TID   1 mg at 12/26/17 0821     venlafaxine (EFFEXOR-XR) 24 hr capsule 150 mg  150 mg Oral Daily with breakfast   150 mg at 12/26/17 0821     cholecalciferol (vitamin D3) tablet 1,000 Units  1,000 Units Oral Daily   1,000 Units at 12/26/17 0821     No current [facility]-ordered outpatient prescriptions on file.         Allergies      Allergies   Allergen Reactions     Diphenhydramine Hcl      Throat started to close up        Medical Review of Systems     /76  Pulse 83  Temp 97.9  F (36.6  C) (Oral)  Resp 16  Ht 1.676 m (5' 6\")  Wt 89.6 kg (197 lb 9.6 oz)  SpO2 97%  BMI 31.89 kg/m2  Body mass index is 31.89 kg/(m^2).  A 10-point review of systems was performed by Mauri Fleming MD and is negative, no new findings.      Psychiatric Examination     Appearance Sitting in chair, dressed in casual clothes. Appears stated age.   Attitude Cooperative   Orientation Oriented to person, place, time   Eye Contact Fair   Speech Clear, coherent, normal prosody   Language Normal   Psychomotor Behavior Normal   Mood Slightly less anxious   Affect Broader range   Thought Process Intact   Associations Intact   Thought Content Patient is currently positive for suicide ideation, negative for plan or intent, able to contract no self harm and identify barriers " to suicide. Positive for auditory and visual hallucinations.   Fund of Knowledge Average   Insight Slightly improved   Judgement Slightly improved   Attention Span & Concentration Fair   Recent & Remote Memory Intact   Gait Normal   Muscle Tone Intact        Labs     Labs reviewed.  No results found for this or any previous visit (from the past 24 hour(s)).     Impression     Abi Mcnair is a 47 year old female with a history of depression, anxiety, and an eating disorder who presented to [facility] ED for evaluation and treatment of progressively worsening auditory hallucinations, paranoia, and thoughts of self-harm.      Diagnoses     1. Major depressive disorder, recurrent, severe with psychotic features  2. Anxiety, NOS   3. Eating disorder, inactive      Plan     1. Explained side effects, benefits, and complications of medications to the patient, Pt gave verbal consent.  2. Medication changes: Continue current medications, encouraged use of Seroquel prn.  3. Discussed treatment plan with patient and team.  4. Projected length of stay: Until pt has been stabilized with aftercare in place.  5. Pt to attend all groups otherwise discharge AMA.      Attestation:   Patient has been seen and evaluated by me, Mauri Fleming MD.    Patient ID:  Name: Abi Mcnair    MRN: [id number]  Admission: 11/8/2017     YOB: 1970

## 2017-12-26 NOTE — PROGRESS NOTES
SPIRITUAL HEALTH SERVICES Progress Note  [facility]    Brief follow-up visit with pt. Pt reflected a bit on [place] in the hospital and stated that all things considered, it went well. Pt does not yet know when she will be going home. SH prayed with pt. SH will continue to follow.      Bertha Escalante  Chaplain Resident  Pager: [phone]  Office: [phone]

## 2017-12-27 VITALS
HEIGHT: 66 IN | TEMPERATURE: 98 F | HEART RATE: 92 BPM | WEIGHT: 197.6 LBS | OXYGEN SATURATION: 97 % | DIASTOLIC BLOOD PRESSURE: 80 MMHG | SYSTOLIC BLOOD PRESSURE: 128 MMHG | RESPIRATION RATE: 16 BRPM | BODY MASS INDEX: 31.76 KG/M2

## 2017-12-27 PROCEDURE — 25000132 ZZH RX MED GY IP 250 OP 250 PS 637: Performed by: PSYCHIATRY & NEUROLOGY

## 2017-12-27 PROCEDURE — 97150 GROUP THERAPEUTIC PROCEDURES: CPT | Mod: GO

## 2017-12-27 PROCEDURE — 90853 GROUP PSYCHOTHERAPY: CPT

## 2017-12-27 RX ORDER — POLYETHYLENE GLYCOL 3350 17 G/17G
17 POWDER, FOR SOLUTION ORAL DAILY PRN
Status: DISCONTINUED | OUTPATIENT
Start: 2017-12-27 | End: 2017-12-27 | Stop reason: HOSPADM

## 2017-12-27 RX ORDER — LURASIDONE HYDROCHLORIDE 120 MG/1
120 TABLET, FILM COATED ORAL DAILY
Qty: 30 TABLET | Refills: 0 | Status: SHIPPED | OUTPATIENT
Start: 2017-12-27 | End: 2018-08-11

## 2017-12-27 RX ORDER — QUETIAPINE FUMARATE 200 MG/1
200 TABLET, FILM COATED ORAL 3 TIMES DAILY
Qty: 90 TABLET | Refills: 0 | Status: SHIPPED | OUTPATIENT
Start: 2017-12-27 | End: 2018-08-11

## 2017-12-27 RX ADMIN — CLONAZEPAM 1 MG: 1 TABLET ORAL at 07:32

## 2017-12-27 RX ADMIN — QUETIAPINE FUMARATE 200 MG: 200 TABLET, FILM COATED ORAL at 07:32

## 2017-12-27 RX ADMIN — VENLAFAXINE HYDROCHLORIDE 150 MG: 150 CAPSULE, EXTENDED RELEASE ORAL at 07:32

## 2017-12-27 RX ADMIN — VITAMIN D, TAB 1000IU (100/BT) 1000 UNITS: 25 TAB at 07:32

## 2017-12-27 NOTE — PLAN OF CARE
"Problem: Psychotic Symptoms  Goal: Psychotic Symptoms  Signs and symptoms of listed problems will be absent or manageable.   Outcome: Improving  Patient continues to have auditory hallucinations, but states that the meds are helping.  \"They are very loud in the morning & then get quieter throughout the day\".  Patient is scheduled for a family meeting tomorrow & hopes to be discharged soon thereafter.  She would like to attend DBT on Thursday.  The patient attended both groups & participated appropriately.  She denies any thoughts of self-harm.  Pleasant on approach.      "

## 2017-12-27 NOTE — PLAN OF CARE
Problem: General Plan of Care (Inpatient Behavioral)  Goal: Discharge Planning  Meeting held with patient's mother and 2 sisters and patient along with Dr. Fleming and domenic.  Patient has wrestled with depression and anxiety since age 13. For some time she made good progress learning and applying skills to manage. When she lost her therapist, she tried others, but found them not to be a good fit, so has not had a consistent therapist. Family said patient got into cutting at 19. Dr. Fleming discussed borderline personality disorder and how patient fit that diagnosis . More current difficulties have involved patient hearing voices. Though on large doses of antipsychotics and still disturbed by the voices. Discussed focusing more on what she does know  How to do and to look past the chatter to focus on the here and now in which she has  Tasks to complete that mean something to her, skills with which to accomplish what she has chosen to do. Patient thinks she is ready to discharge and has follow up in place at [facility]. Family members available for support and respite. She will discharge today.

## 2017-12-27 NOTE — PROGRESS NOTES
Patient denies suidicidal ideation.  Patient has a copy of discharge instructions and verbalizes understanding of them.  Copy also givent to mother.Discharged to home at 1210 with her mother.

## 2017-12-27 NOTE — DISCHARGE SUMMARY
[facility] Psychiatric Discharge Summary      DATE OF ADMISSION: 12/13/2017     DATE OF DISCHARGE: 12/27/2017    PRIMARY CARE PHYSICIAN: No Ref-Primary, Physician    IDENTIFICATION: Abi Mcnair is a 47 year old female with a history of depression, anxiety, and an eating disorder who presented to [facility] ED for evaluation and treatment of progressively worsening auditory hallucinations, paranoia, and thoughts of self-harm. Pt sees PCP Ree Gusman. Pt's psychiatrist is Dr. Smita Mchugh at [facility].  For history, see dictation by Dr. Fleming on 12/14/17. For physical summary, see dictation by NESSA Levin CNP on 12/14/17.     HOSPITAL COURSE: Ms. Abi Mcnair is a 47 year old  female with a PMHx of depression who presents as readmission due to worsening auditory and visual hallucinations. For additional history, please see Dr. Fleming's history on 11/19/17. She describes her voices as male and female. She reports that she can hear them  She also complains of constipation, taking Miralax, and also dry mouth. She also has issues concentrating mainly due to her hallucinations. She states that the voices lessen when she is engaged in activities. She states she does not feel too depressed while in the hospital.  However, she affirms feeling as if she is watched, which is not true when she is at home. She is more fearful. Denies suicidal or homicidal ideation.  She states she took one dose of Xanax which was not prescribed to her, and this was not beneficial. Per [place] , it appears her dose of Klonopin has been increased to 1mg tid. Pt is an anxious person, a worrier. Pt endorses consistent, pervasive sense of anxiety and worry. Pt finds this anxiety difficult to control, often resulting in restlessness, feeling on edge, irritability, and sleep disturbance.            On 12/14/17, pt was admitted to  and was started on Latuda 40mg q1800. On 12/15/17, pt continued  to report auditory hallucinations and suicidal ideation. Latuda was increased to 60mg q1800.  On 12/16/17 Zyprexa p.r.n. was discontinued and Seroquel 100 mg q.4 hours p.r.n. Started. On 12/17/17, pt continued to appear distraught and reported only temporary relief with Seroquel. On 12/18/17, pt appeared less upset and agitated. She continued to endorse intrusive auditory hallucinations. Latuda was increased to 80mg q1800. On 12/19/17, pt continued to experience auditory hallucinations, voices and organ music. Increased Latuda to 120mg. On 12/20/17, pt exhibited auditory hallucinations. She was to continue her current medications. On 12/21/17, pt had been observed not to be attending groups. She was informed that if she does not participate in programming, she will be discharged AMA. Patient care order placed. On 12/22/17, pt was observed to be attending groups and appreciated the conversation regarding her behavior. On 12/26/17, pt continued to experience visual and auditory hallucinations. She was encouraged to take more Seroquel as it was beneficial. On 12/27/17, family meeting was held to discuss discharge plan, they were in agreement with pt returning home and attending DBT. Denies suicidal or homicidal ideation. 30 day supply of medication provided at time of discharge.     DISCHARGE MENTAL STATUS EXAMINATION:       Appearance Sitting in chair, dressed in casual clothes. Appears stated age.   Attitude Cooperative   Orientation Oriented to person, place, time   Eye Contact Fair   Speech Clear, coherent, normal prosody   Language Normal   Psychomotor Behavior Normal   Mood Much less anxious   Affect Broad range   Thought Process Intact   Associations Intact   Thought Content Patient is currently negative for suicide ideation, negative for plan or intent, able to contract no self harm and identify barriers to suicide. Positive for auditory and visual hallucinations.   Fund of Knowledge Average   Insight Fair  "  Judgement Improving   Attention Span & Concentration Fair   Recent & Remote Memory Intact   Gait Normal   Muscle Tone Intact          LABORATORY DATA:    Refer to hospitalist admission dictation.  No results found for this or any previous visit (from the past 24 hour(s)).     /80  Pulse 92  Temp 98  F (36.7  C) (Oral)  Resp 16  Ht 1.676 m (5' 6\")  Wt 89.6 kg (197 lb 9.6 oz)  SpO2 97%  BMI 31.89 kg/m2     DISCHARGE MEDICATIONS:      Review of your medicines      START taking       Dose / Directions    lurasidone 120 MG Tabs tablet   Commonly known as:  LATUDA   Used for:  Severe recurrent major depression with psychotic features (H)        Dose:  120 mg   Take 1 tablet (120 mg) by mouth daily   Quantity:  30 tablet   Refills:  0         CONTINUE these medicines which may have CHANGED, or have new prescriptions. If we are uncertain of the size of tablets/capsules you have at home, strength may be listed as something that might have changed.       Dose / Directions    QUEtiapine 200 MG tablet   Commonly known as:  SEROquel   This may have changed:    - medication strength  - how much to take  - when to take this  - Another medication with the same name was removed. Continue taking this medication, and follow the directions you see here.   Used for:  Severe recurrent major depression with psychotic features (H)   Notes to Patient:  Spread doses out during the day        Dose:  200 mg   Take 1 tablet (200 mg) by mouth 3 times daily   Quantity:  90 tablet   Refills:  0         CONTINUE these medicines which have NOT CHANGED       Dose / Directions    CLONAZEPAM PO        Dose:  1 mg   Take 1 mg by mouth 3 times daily   Refills:  0       venlafaxine 150 MG Tb24 24 hr tablet   Commonly known as:  EFFEXOR-ER        Dose:  150 mg   Take 150 mg by mouth daily (with breakfast)   Refills:  0       VITAMIN D (CHOLECALCIFEROL) PO        Dose:  1000 Units   Take 1,000 Units by mouth daily   Refills:  0         STOP " taking          ALPRAZOLAM PO           ARIPiprazole 10 MG tablet   Commonly known as:  ABILIFY           HYDROXYZINE HCL PO           OLANZapine zydis 10 MG ODT tab   Commonly known as:  zyPREXA                Where to get your medicines      These medications were sent to [facility] [id number] [place] [address] AT [address] [address]    Hours:  24-hours Phone:  [phone]      lurasidone 120 MG Tabs tablet     QUEtiapine 200 MG tablet             DISCHARGE DIAGNOSES:    1. Bipolar Disorder, current episode mixed, with psychotic features.  2. Anxiety, NOS   3. Eating disorder, inactive   4. Personality Disorder with borderline traits.    DISCHARGE PLAN:     1. Explained side effects, benefits, and complications of medications to the patient, Pt gave verbal consent.  2. Medication changes: Continue current medications, encouraged use of Seroquel prn.  3. Discussed treatment plan with patient and team.  4. Projected length of stay: Pt to discharge today.  5. 30 day supply of medication provided at time of discharge.   6. DBT therapy next Friday.  7. Continue seeing therapist and psychiatric provider.    DISCHARGE FOLLOW-UP:    Psychiatry Follow-up:   Dr. Rodriguez medication management appointment on Friday 12/29/17 at 8 am.  DBT- Thursdays at 2:30 pm, so-Thursday 1/4/18 @2 pm  Therapy with Jamison Rinaldi- Fridays at 12 noon, so-Friday 1/5/18@12 noon.  Note: Jamison is not working on 12/28and 12/29- so no appointments those days.  [facility]  [address] [place] [address] [id number]  [place]  [address]  Phone:  [phone]  Fax:  [phone]    Attestation:   Patient has been seen and evaluated by me, Mauri Fleming MD.    Patient ID:    Name: Abi Mcnair   MRN: [id number]  Admission: 12/13/2017   YOB: 1970

## 2017-12-27 NOTE — PROGRESS NOTES
"[facility] Psychiatric Progress Note       Interim History     Family meeting held with pt's sisters, mother, MD, and . Her family would like to know how best to help pt and what to do with her behavior. Discussed a diagnosis of borderline personality disorder and the lability of her mood. Her mother states that her issues have started in middle school. She claims that that pt been \"very, very open with me\" until she sought professional help through [facility]. She did not properly follow up with her initial care, and so fell through the cracks. Her family believes that she is high functioning for a person with mental illness. She has been able to support herself independently, went to school and self-employed. However, her family states she lacerated her wrists starting at the age of 19, endorsed to her mother \"that's to relieve the pain.\" Informed her family that she is pleasant, but not participating in programming. She has engaged in splitting behavior amongst her peers and was disruptive to the dynamic in the milieu. Since she had been encouraged to attend groups, there have not been any issues. Suggested that pt's family seek out resources through [facility] to gain more insight into pt's diagnosis.     Pt later entered the room. Tolerating medications without side effects. Side effects, risks, and benefits of medications reviewed with patient. She continues to have auditory hallucinations, \"they just freak me out.\" Informed pt that she is already on high doses of antipsychotic medications and most likely her voices will persist for a long time. Encouraged her to reframe her perspective to focus on positive aspects rather than the cacophony of voices. She will eventually taper off of Klonopin on an outpatient basis when she has stabilized further. She states that she will be able to return to DBT therapy and sees an individual therapist as well. She states she has a new provider for " medication management. Pt to discharge today. 30 day supply of medication provided at time of discharge. Denies suicidal or homicidal ideation.    Hospital Course     On 12/14/17, pt was admitted to  and was started on Latuda 40mg q1800. On 12/15/17, pt continued to report auditory hallucinations and suicidal ideation. Latuda was increased to 60mg q1800.  On 12/16/17 Zyprexa p.r.n. was discontinued and Seroquel 100 mg q.4 hours p.r.n. Started. On 12/17/17, pt continued to appear distraught and reported only temporary relief with Seroquel. On 12/18/17, pt appeared less upset and agitated. She continued to endorse intrusive auditory hallucinations. Latuda was increased to 80mg q1800. On 12/19/17, pt continued to experience auditory hallucinations, voices and organ music. Increased Latuda to 120mg. On 12/20/17, pt exhibited auditory hallucinations. She was to continue her current medications. On 12/21/17, pt had been observed not to be attending groups. She was informed that if she does not participate in programming, she will be discharged AMA. Patient care order placed. On 12/22/17, pt was observed to be attending groups and appreciated the conversation regarding her behavior. On 12/26/17, pt continued to experience visual and auditory hallucinations. She was encouraged to take more Seroquel as it was beneficial. On 12/27/17, family meeting was held to discuss discharge plan, they were in agreement with pt returning home and attending DBT. Denies suicidal or homicidal ideation. 30 day supply of medication provided at time of discharge.      Medications     Current Facility-Administered Medications Ordered in Epic   Medication Dose Route Frequency Last Rate Last Dose     polyethylene glycol (MIRALAX/GLYCOLAX) Packet 17 g  17 g Oral Daily PRN         lurasidone (LATUDA) tablet 120 mg  120 mg Oral Daily   120 mg at 12/26/17 1830     QUEtiapine (SEROquel) tablet 200 mg  200 mg Oral Q6H PRN   200 mg at 12/27/17 0732      "acetaminophen (TYLENOL) tablet 1,000 mg  1,000 mg Oral Q6H PRN   1,000 mg at 12/26/17 0651     ibuprofen (ADVIL/MOTRIN) tablet 400 mg  400 mg Oral Q6H PRN         docusate sodium (COLACE) capsule 100 mg  100 mg Oral BID PRN         clonazePAM (klonoPIN) tablet 1 mg  1 mg Oral TID   1 mg at 12/27/17 0732     venlafaxine (EFFEXOR-XR) 24 hr capsule 150 mg  150 mg Oral Daily with breakfast   150 mg at 12/27/17 0732     cholecalciferol (vitamin D3) tablet 1,000 Units  1,000 Units Oral Daily   1,000 Units at 12/27/17 0732     No current Epic-ordered outpatient prescriptions on file.         Allergies      Allergies   Allergen Reactions     Diphenhydramine Hcl      Throat started to close up        Medical Review of Systems     /80  Pulse 92  Temp 98  F (36.7  C) (Oral)  Resp 16  Ht 1.676 m (5' 6\")  Wt 89.6 kg (197 lb 9.6 oz)  SpO2 97%  BMI 31.89 kg/m2  Body mass index is 31.89 kg/(m^2).  A 10-point review of systems was performed by Mauri Fleming MD and is negative, no new findings.      Psychiatric Examination     Appearance Sitting in chair, dressed in casual clothes. Appears stated age.   Attitude Cooperative   Orientation Oriented to person, place, time   Eye Contact Fair   Speech Clear, coherent, normal prosody   Language Normal   Psychomotor Behavior Normal   Mood Much less anxious   Affect Broad range   Thought Process Intact   Associations Intact   Thought Content Patient is currently negative for suicide ideation, negative for plan or intent, able to contract no self harm and identify barriers to suicide. Positive for auditory and visual hallucinations.   Fund of Knowledge Average   Insight Fair   Judgement Improving   Attention Span & Concentration Fair   Recent & Remote Memory Intact   Gait Normal   Muscle Tone Intact        Labs     Labs reviewed.  No results found for this or any previous visit (from the past 24 hour(s)).     Impression     Abi Mcnair is a 47 year old female with " a history of depression, anxiety, and an eating disorder who presented to [facility] for evaluation and treatment of progressively worsening auditory hallucinations, paranoia, and thoughts of self-harm.      Diagnoses     1. Bipolar Disorder, current episode mixed, with psychotic features.  2. Anxiety, NOS   3. Eating disorder, inactive   4. Personality Disorder with borderline traits.     Plan     1. Explained side effects, benefits, and complications of medications to the patient, Pt gave verbal consent.  2. Medication changes: Continue current medications, encouraged use of Seroquel prn.  3. Discussed treatment plan with patient and team.  4. Projected length of stay: Pt to discharge today.  5. 30 day supply of medication provided at time of discharge.   6. DBT therapy next Friday.  7. Continue seeing therapist and psychiatric provider.      Attestation:   Patient has been seen and evaluated by me, Mauri Fleming MD.    Patient ID:  Name: Abi Mcnair    MRN: [id number]  Admission: 11/8/2017     YOB: 1970

## 2017-12-27 NOTE — DISCHARGE INSTRUCTIONS
Behavioral Discharge Planning and Instructions    Summary: Admitted to hospital for evaluation of worsening auditory hallucinations.    Main Diagnosis: Major depressive disorder with psychotic symptoms.     Major Treatments, Procedures and Findings: Psychiatric assessment.    Symptoms to Report: increased confusion or mood getting worse    Lifestyle Adjustment: Follow up with DBT, individual work and medication management. Work to attend to what you can do and need to do, rather than attending to your most distressing symptoms.    Psychiatry Follow-up:   Dr. Rodriguez medication management appointment on Friday 12/29/17 at 8 am.  DBT- Thursdays at 2:30 pm, so-Thursday 1/4/18 @2 pm  Therapy with Jamison Rinaldi- Fridays at 12 noon, so-Friday 1/5/18@12 noon.  Note: Jamison is not working on 12/28and 12/29- so no appointments those days.  [facility]  [address] [place] [address] [address]  [place]  [address]  Phone:  [phone]  Fax:  [phone]    Resources:   Crisis Intervention: 160.632.4409 or 093-572-0493 (TTY: 676.620.8698).  Call anytime for help.  National [place] [employer] Mental Illness (www.[place].bandar.org): 110.943.9151 or 671-400-3023.  National Suicide Prevention Line (www.mentalhealthmn.org): 611-977-TCRI (6860)    General Medication Instructions:   See your medication sheet(s) for instructions.   Take all medicines as directed.  Make no changes unless your doctor suggests them.   Go to all your doctor visits.  Be sure to have all your required lab tests. This way, your medicines can be refilled on time.  Do not use any drugs not prescribed by your doctor.  Avoid alcohol.

## 2017-12-28 ENCOUNTER — TELEPHONE (OUTPATIENT)
Dept: FAMILY MEDICINE | Facility: CLINIC | Age: 47
End: 2017-12-28

## 2017-12-28 NOTE — PLAN OF CARE
Problem: General Plan of Care (Inpatient Behavioral)  Goal: Discharge Planning  Patient attended Process group on 12/26/17. Participation complete and appropriate.

## 2017-12-28 NOTE — TELEPHONE ENCOUNTER
ED / Discharge Outreach Protocol    Patient Contact    Attempt # 1    Was call answered?  No.  Left message on voicemail with information to call me back.  KALI RoN, RN

## 2017-12-29 NOTE — TELEPHONE ENCOUNTER
"Hospital/TCU/ED for chronic condition Discharge Protocol    \"Hi, my name is Lamont Drake, a registered nurse, and I am calling from [facility].  I am calling to follow up and see how things are going for you after your recent emergency visit/hospital/TCU stay.\"    Tell me how you are doing now that you are home?\" pt is doing fine. She sees a psychiatrist and psychologist.      Discharge Instructions    \"Let's review your discharge instructions.  What is/are the follow-up recommendations?  Pt. Response: nothing needed from pcp.    \"Has an appointment with your primary care provider been scheduled?\"   None needed with pcp.    \"When you see the provider, I would recommend that you bring your medications with you.\"    Medications    \"Tell me what changed about your medicines when you discharged?\"    Changes to chronic meds?    2 or more - Epic MTM referral needed    \"What questions do you have about your medications?\"    None     New diagnoses of heart failure, COPD, diabetes, or MI?    No                  Medication reconciliation completed? Yes  Was MTM referral placed (*Make sure to put transitions as reason for referral)?   No    Call Summary    \"What questions or concerns do you have about your recent visit and your follow-up care?\"     none    \"If you have questions or things don't continue to improve, we encourage you contact us through the main clinic number (give number).  Even if the clinic is not open, triage nurses are available 24/7 to help you.     We would like you to know that our clinic has extended hours (provide information).  We also have urgent care (provide details on closest location and hours/contact info)\"      \"Thank you for your time and take care!\"  Routing to [facility].  SHARON Craig         "

## 2018-08-11 ENCOUNTER — HOSPITAL ENCOUNTER (EMERGENCY)
Facility: CLINIC | Age: 48
Discharge: HOME | End: 2018-08-11
Attending: EMERGENCY MEDICINE | Admitting: EMERGENCY MEDICINE
Payer: COMMERCIAL

## 2018-08-11 VITALS
RESPIRATION RATE: 18 BRPM | WEIGHT: 190 LBS | TEMPERATURE: 97.1 F | HEIGHT: 66 IN | SYSTOLIC BLOOD PRESSURE: 124 MMHG | BODY MASS INDEX: 30.53 KG/M2 | OXYGEN SATURATION: 99 % | DIASTOLIC BLOOD PRESSURE: 74 MMHG

## 2018-08-11 DIAGNOSIS — R44.3 HALLUCINATIONS: ICD-10-CM

## 2018-08-11 DIAGNOSIS — G47.9 SLEEP DIFFICULTIES: ICD-10-CM

## 2018-08-11 DIAGNOSIS — F41.8 DEPRESSION WITH ANXIETY: ICD-10-CM

## 2018-08-11 PROCEDURE — 90791 PSYCH DIAGNOSTIC EVALUATION: CPT

## 2018-08-11 PROCEDURE — 99285 EMERGENCY DEPT VISIT HI MDM: CPT | Mod: 25

## 2018-08-11 RX ORDER — TRAZODONE HYDROCHLORIDE 50 MG/1
50 TABLET, FILM COATED ORAL AT BEDTIME
Qty: 3 TABLET | Refills: 0 | Status: SHIPPED | OUTPATIENT
Start: 2018-08-11 | End: 2023-07-03

## 2018-08-11 NOTE — DISCHARGE INSTRUCTIONS
Please return to the ED if you notice increasing suicidal ideation or thoughts, thoughts of hurting yourself or others, hallucinations, difficulty taking your medications or other acute concerns.  Please follow up with your PCP/psych in the next 2-3 days.      Discharge Instructions  Mental Health Concerns    You were seen today for mental health concerns, such as depression, anxiety, or suicidal thinking. Your provider feels that you do not require hospitalization at this time. However, your symptoms may become worse, and you may need to return to the Emergency Department. Most treatments of depression and suicidal thoughts are a process rather than a single intervention.  Medications and counseling can take several weeks or more to help.    Generally, every Emergency Department visit should have a follow-up clinic visit with either a primary or a specialty clinic/provider. Please follow-up as instructed by your emergency provider today.    By accepting these discharge instructions:    You promise to not harm yourself or others.    You agree that if you feel you are becoming unable to keep that promise, you will do something to help yourself before you do anything to harm yourself or others.     You agree to keep any safety plan arranged on your visit here today.    You agree to take any medication prescribed or recommended by your provider.    If you are getting worse, you can contact a friend or a family member, contact your counselor or family provider, contact a crisis line, or other options discussed with the provider or therapist today.    At any time, you can call 911 and return to the Emergency Department for more help.    You understand that follow-up is essential to your treatment, and you will make and keep appointments recommended on your visit today.    How to improve your mental health and prevent suicide:    Involve others by letting family, friends, counselors know.  Do not isolate  yourself.    Avoid alcohol or drugs. Remove weapons, poisons from your home.    Try to stick to routines for eating, sleeping and getting regular exercise.      Try to get into sunlight. Bright natural light not only treats seasonal affective disorder but also depression.    Increase safe activities that you enjoy.    If you feel worse, contact 3-938-VBGADDT ([phone]), or call 911, or your primary provider/counselor for additional assistance.    If you were given a prescription for medicine here today, be sure to read all of the information (including the package insert) that comes with your prescription.  This will include important information about the medicine, its side effects, and any warnings that you need to know about.  The pharmacist who fills the prescription can provide more information and answer questions you may have about the medicine.  If you have questions or concerns that the pharmacist cannot address, please call or return to the Emergency Department.   Remember that you can always come back to the Emergency Department if you are not able to see your regular provider in the amount of time listed above, if you get any new symptoms, or if there is anything that worries you.

## 2018-08-11 NOTE — ED AVS SNAPSHOT
Emergency Department    [address] [place] [address]    Phone:  [phone]    Fax:  [phone]                                       Abi Mcnair   MRN: [id number]    Department:   Emergency Department   Date of Visit:  8/11/2018           After Visit Summary Signature Page     I have received my discharge instructions, and my questions have been answered. I have discussed any challenges I see with this plan with the nurse or doctor.    ..........................................................................................................................................  Patient/Patient Representative Signature      ..........................................................................................................................................  Patient Representative Print Name and Relationship to Patient    ..................................................               ................................................  Date                                            Time    ..........................................................................................................................................  Reviewed by Signature/Title    ...................................................              ..............................................  Date                                                            Time

## 2018-08-11 NOTE — ED PROVIDER NOTES
History     Chief Complaint:  Psychiatric Evaluation    The history is provided by the patient.      Abi Mcnair is a 48 year old female with a history of auditory hallucinations, depression and psychosis who presents to the emergency department for psychiatric evaluation. Of note, the patient has a history of hospital admission in December for auditory hallucinations. Since her discharge the patient reports that her auditory hallucinations have been wave-like in intensity ranging from a scream to whispering. Today the patient reports that the auditory hallucinations were screaming in her head along with new onset of visual hallucinations of people crawling towards her that she could feel touching her. The patient reports taking her typical medications prior to bed at 2330 but the continuation of the hallucinations kept her up, prompting her to go for a drive to the ED to seek further evaluation. Here, the patient complains of the hallucinations that have been keeping her up. She additionally states that she feels increased energy as she is wide awake in the middle of the night and reports that her sleep has been cycling from little sleep to too much sleep over the past few weeks. The patient denies any medication changes recently as well as denying any suicidal or homicidal ideation personally or secondary to the voices.  Patient also reports that she is going to her job.  She is functioning normally.  She is eating and drinking normally.  Additionally, she denies any tobacco, alcohol or recreational drug use tonight that may have prompted the voices. Of note, the patient typically follows with her psychiatrist, Dr. Garrison Rodriguez.    Allergies:  Diphenhydramine HCl    Medications:    Clonazepam  Quetiapine  Venlafaxine  Cholecalciferol    Past Medical History:    Anxiety  Auditory Hallucinations  Depression  Insomnia  Menorrhagia  Psychosis    Past Surgical History:    Ganglion Cyst Excision:  "Wrist    Family History:    Varicose Veins  Osteopenia  Arthritis  Lipids  Depression  Alcohol/Drug Dependence  CAD: Paternal Cousin  Diabetes  Hypertension  Colorectal Cancer  Seizures  Thyroid Disease    Social History:  Marital Status:  Single [1]  Tobacco Use: No  Alcohol Use: No    Review of Systems   All other systems reviewed and are negative.      Physical Exam     Patient Vitals for the past 24 hrs:   BP Temp Temp src Heart Rate Resp SpO2 Height Weight   18 0308 132/83 97.1  F (36.2  C) Temporal 76 16 100 % 1.676 m (5' 6\") 86.2 kg (190 lb)       Physical Exam  General: Resting on the bed.  Ears, Nose, Throat:  External ears normal.  Nose normal.    Eyes:  Conjunctivae clear.  Pupils are equal, round, and reactive.   CV: Regular rate and rhythm.  No murmurs.      Respiratory: Effort normal and breath sounds normal.  No wheezing or crackles.   Gastrointestinal: Soft.  No distension. There is no tenderness.  There is no rigidity, no rebound and no guarding.   Skin: Skin is warm and dry.  No rash noted.   Psych: Normal mood and affect. Behavior is normal.   Patient endorses auditory visual hallucinations.  Patient denies any suicidal or homicidal ideation.  Engaged eye contact.  Normal speech.    Emergency Department Course     Emergency Department Course:  0325 Nursing notes and vitals reviewed. I performed an exam of the patient as documented above.     0430 I consulted with DEC concerning the patient's presentation and subsequent plan for treatment.     0443 I rechecked the patient and discussed the results of her workup thus far.     Findings and plan explained to the patient. Patient discharged home with instructions regarding supportive care, medications, and reasons to return. The importance of close follow-up was reviewed.     I personally answered all related questions prior to discharge.      Impression & Plan      Medical Decision Makin-year-old female with a history of bipolar, " depression with psychosis, hallucinations presenting with hallucinations.  Vital signs are unremarkable.  Broad differential pursued including not limited to decompensation, medication changes, medication noncompliance, trauma, drug or alcohol abuse, etc.  Overall patient's well-appearing nontoxic.  She is appropriately interactive and concerned about hallucinations.  She has a history of hallucinations therefore this is not acute or changed condition.  There is no signs of trauma on examination.  Patient denies any alcohol or street drug use.  She is calm and cooperative.  She reports compliance with her medications.  There is been no recent medication changes.  She appears to be able to care for herself.  She is concerned about her job tomorrow.  She is a safe place to go, and can go to her parents house for added safety.  She denies any suicidal or homicidal ideation.  Hallucinations are not telling her to commit suicide either.  Patient was seen and evaluated by DEC. after discussion with the DEC provider we both agree that patient appears to be appropriate for discharge home.  Patient would like something to sleep as that is her base concern at this point that the hallucinations are keeping her from sleeping.  She was offered hospitalization but declines.  At this point she appears to be caring for herself.  She does not appear to be decompensated or immediate threat to self or others.  She is able to contract for safety.  She plans to call her provider on Saturday or Monday to arrange follow-up and medication adjustments.  She reports in the past the trazodone has helped her sleep therefore she is given a 3 day supply of trazodone for sleep aid.  She reports tolerating trazodone in the past without issues as well.  Patient is calm cooperative and appears to be appropriate for discharge home.  Strict return precautions were discussed and she was discharged in stable condition.      Diagnosis:    ICD-10-CM    1.  Depression with anxiety F41.8    2. Hallucinations R44.3    3. Sleep difficulties G47.9        Disposition:  discharged to home    Discharge Medications:  New Prescriptions    TRAZODONE (DESYREL) 50 MG TABLET    Take 1 tablet (50 mg) by mouth At Bedtime     Scribe Disclosure:  I, Yobani Krueger, am serving as a scribe on 8/11/2018 at 3:25 AM to personally document services performed by Roxane Mustafa MD based on my observations and the provider's statements to me.     Yobani Krueger  8/11/2018    EMERGENCY DEPARTMENT       Roxane Mustafa MD  08/11/18 0549

## 2018-08-11 NOTE — ED AVS SNAPSHOT
Emergency Department    [address] [place] [address]    Phone:  [phone]    Fax:  [phone]                                       Abi Mcnair   MRN: [id number]    Department:   Emergency Department   Date of Visit:  8/11/2018           Patient Information     Date Of Birth          1970        Your diagnoses for this visit were:     Depression with anxiety     Hallucinations     Sleep difficulties        You were seen by Roxane Mustafa MD.      Follow-up Information     Follow up with Garrison Rodriguez MD. Schedule an appointment as soon as possible for a visit in 2 days.    Specialty:  Psychiatry    Contact information:    [facility]  [address] [address] [phone]          Discharge Instructions       Please return to the ED if you notice increasing suicidal ideation or thoughts, thoughts of hurting yourself or others, hallucinations, difficulty taking your medications or other acute concerns.  Please follow up with your PCP/psych in the next 2-3 days.      Discharge Instructions  Mental Health Concerns    You were seen today for mental health concerns, such as depression, anxiety, or suicidal thinking. Your provider feels that you do not require hospitalization at this time. However, your symptoms may become worse, and you may need to return to the Emergency Department. Most treatments of depression and suicidal thoughts are a process rather than a single intervention.  Medications and counseling can take several weeks or more to help.    Generally, every Emergency Department visit should have a follow-up clinic visit with either a primary or a specialty clinic/provider. Please follow-up as instructed by your emergency provider today.    By accepting these discharge instructions:    You promise to not harm yourself or others.    You agree that if you feel you are becoming unable to keep that promise, you will do  something to help yourself before you do anything to harm yourself or others.     You agree to keep any safety plan arranged on your visit here today.    You agree to take any medication prescribed or recommended by your provider.    If you are getting worse, you can contact a friend or a family member, contact your counselor or family provider, contact a crisis line, or other options discussed with the provider or therapist today.    At any time, you can call 911 and return to the Emergency Department for more help.    You understand that follow-up is essential to your treatment, and you will make and keep appointments recommended on your visit today.    How to improve your mental health and prevent suicide:    Involve others by letting family, friends, counselors know.  Do not isolate yourself.    Avoid alcohol or drugs. Remove weapons, poisons from your home.    Try to stick to routines for eating, sleeping and getting regular exercise.      Try to get into sunlight. Bright natural light not only treats seasonal affective disorder but also depression.    Increase safe activities that you enjoy.    If you feel worse, contact [phone] ([phone]), or call 911, or your primary provider/counselor for additional assistance.    If you were given a prescription for medicine here today, be sure to read all of the information (including the package insert) that comes with your prescription.  This will include important information about the medicine, its side effects, and any warnings that you need to know about.  The pharmacist who fills the prescription can provide more information and answer questions you may have about the medicine.  If you have questions or concerns that the pharmacist cannot address, please call or return to the Emergency Department.   Remember that you can always come back to the Emergency Department if you are not able to see your regular provider in the amount of time listed above, if  you get any new symptoms, or if there is anything that worries you.      24 Hour Appointment Hotline       To make an appointment at [facility], call [phone] ([phone]). If you don't have a family doctor or clinic, we will help you find one. [facility] are conveniently located to serve the needs of you and your family.             Review of your medicines      START taking        Dose / Directions Last dose taken    traZODone 50 MG tablet   Commonly known as:  DESYREL   Dose:  50 mg   Quantity:  3 tablet        Take 1 tablet (50 mg) by mouth At Bedtime   Refills:  0          Our records show that you are taking the medicines listed below. If these are incorrect, please call your family doctor or clinic.        Dose / Directions Last dose taken    CLONAZEPAM PO   Dose:  1 mg        Take 1 mg by mouth 3 times daily   Refills:  0        venlafaxine 150 MG Tb24 24 hr tablet   Commonly known as:  EFFEXOR-ER   Dose:  150 mg        Take 150 mg by mouth daily (with breakfast)   Refills:  0        VITAMIN D (CHOLECALCIFEROL) PO   Dose:  1000 Units        Take 1,000 Units by mouth daily   Refills:  0        VRAYLAR 3 MG Caps capsule   Dose:  3 mg   Generic drug:  cariprazine        Take 3 mg by mouth daily   Refills:  0                Prescriptions were sent or printed at these locations (1 Prescription)                   Other Prescriptions                Printed at Department/Unit printer (1 of 1)         traZODone (DESYREL) 50 MG tablet                Orders Needing Specimen Collection     None      Pending Results     No orders found from 8/9/2018 to 8/12/2018.            Pending Culture Results     No orders found from 8/9/2018 to 8/12/2018.            Pending Results Instructions     If you had any lab results that were not finalized at the time of your Discharge, you can call the ED Lab Result RN at [phone]. You will be contacted by this team for any positive Lab results or changes in  treatment. The nurses are available 7 days a week from 10A to 6:30P.  You can leave a message 24 hours per day and they will return your call.        Test Results From Your Hospital Stay               Clinical Quality Measure: Blood Pressure Screening     Your blood pressure was checked while you were in the emergency department today. The last reading we obtained was  BP: 132/83 . Please read the guidelines below about what these numbers mean and what you should do about them.  If your systolic blood pressure (the top number) is less than 120 and your diastolic blood pressure (the bottom number) is less than 80, then your blood pressure is normal. There is nothing more that you need to do about it.  If your systolic blood pressure (the top number) is 120-139 or your diastolic blood pressure (the bottom number) is 80-89, your blood pressure may be higher than it should be. You should have your blood pressure rechecked within a year by a primary care provider.  If your systolic blood pressure (the top number) is 140 or greater or your diastolic blood pressure (the bottom number) is 90 or greater, you may have high blood pressure. High blood pressure is treatable, but if left untreated over time it can put you at risk for heart attack, stroke, or kidney failure. You should have your blood pressure rechecked by a primary care provider within the next 4 weeks.  If your provider in the emergency department today gave you specific instructions to follow-up with your doctor or provider even sooner than that, you should follow that instruction and not wait for up to 4 weeks for your follow-up visit.        Thank you for choosing [facility]       Thank you for choosing [facility] for your care. Our goal is always to provide you with excellent care. Hearing back from our patients is one way we can continue to improve our services. Please take a few minutes to complete the written survey that you may receive in the mail after you  visit with us. Thank you!        [employer] Information     [employer]birds gives you secure access to your electronic health record. If you see a primary care provider, you can also send messages to your care team and make appointments. If you have questions, please call your primary care clinic.  If you do not have a primary care provider, please call [phone] and they will assist you.        Care EveryWhere ID     This is your Care EveryWhere ID. This could be used by other organizations to access your [facility] medical records  [id number]        Equal Access to Services     ALEX PATEL : Olivia croft Soanjum, wakimberlyn watkins, michael kaaldaniela gaytan, angélica ricardo. So [facility] [phone].    ATENCIÓN: Si habla español, tiene a rodrigues disposición servicios gratuitos de asistencia lingüística. Llame al [phone].    We comply with applicable federal civil rights laws and [place] laws. We do not discriminate on the basis of race, color, national origin, age, disability, sex, sexual orientation, or gender identity.            After Visit Summary       This is your record. Keep this with you and show to your community pharmacist(s) and doctor(s) at your next visit.

## 2019-09-29 ENCOUNTER — HEALTH MAINTENANCE LETTER (OUTPATIENT)
Age: 49
End: 2019-09-29

## 2020-03-15 ENCOUNTER — HEALTH MAINTENANCE LETTER (OUTPATIENT)
Age: 50
End: 2020-03-15

## 2021-01-14 ENCOUNTER — HEALTH MAINTENANCE LETTER (OUTPATIENT)
Age: 51
End: 2021-01-14

## 2021-03-14 ENCOUNTER — HEALTH MAINTENANCE LETTER (OUTPATIENT)
Age: 51
End: 2021-03-14

## 2021-03-17 ENCOUNTER — IMMUNIZATION (OUTPATIENT)
Dept: NURSING | Facility: CLINIC | Age: 51
End: 2021-03-17
Payer: COMMERCIAL

## 2021-03-17 PROCEDURE — 0001A PR COVID VAC PFIZER DIL RECON 30 MCG/0.3 ML IM: CPT

## 2021-03-17 PROCEDURE — 91300 PR COVID VAC PFIZER DIL RECON 30 MCG/0.3 ML IM: CPT

## 2021-04-07 ENCOUNTER — IMMUNIZATION (OUTPATIENT)
Dept: NURSING | Facility: CLINIC | Age: 51
End: 2021-04-07
Attending: INTERNAL MEDICINE
Payer: COMMERCIAL

## 2021-04-07 PROCEDURE — 91300 PR COVID VAC PFIZER DIL RECON 30 MCG/0.3 ML IM: CPT

## 2021-04-07 PROCEDURE — 0002A PR COVID VAC PFIZER DIL RECON 30 MCG/0.3 ML IM: CPT

## 2021-10-24 ENCOUNTER — HEALTH MAINTENANCE LETTER (OUTPATIENT)
Age: 51
End: 2021-10-24

## 2022-02-13 ENCOUNTER — HEALTH MAINTENANCE LETTER (OUTPATIENT)
Age: 52
End: 2022-02-13

## 2022-04-10 ENCOUNTER — HEALTH MAINTENANCE LETTER (OUTPATIENT)
Age: 52
End: 2022-04-10

## 2022-10-15 ENCOUNTER — HEALTH MAINTENANCE LETTER (OUTPATIENT)
Age: 52
End: 2022-10-15

## 2023-03-26 ENCOUNTER — HEALTH MAINTENANCE LETTER (OUTPATIENT)
Age: 53
End: 2023-03-26

## 2023-07-01 ENCOUNTER — HOSPITAL ENCOUNTER (OUTPATIENT)
Facility: CLINIC | Age: 53
Setting detail: OBSERVATION
Discharge: HOME | End: 2023-07-03
Attending: EMERGENCY MEDICINE | Admitting: NURSE PRACTITIONER
Payer: COMMERCIAL

## 2023-07-01 DIAGNOSIS — R44.0 AUDITORY HALLUCINATIONS: ICD-10-CM

## 2023-07-01 DIAGNOSIS — F25.9 SCHIZOAFFECTIVE DISORDER, UNSPECIFIED TYPE (H): ICD-10-CM

## 2023-07-01 PROCEDURE — G0378 HOSPITAL OBSERVATION PER HR: HCPCS

## 2023-07-01 PROCEDURE — 250N000013 HC RX MED GY IP 250 OP 250 PS 637: Performed by: NURSE PRACTITIONER

## 2023-07-01 PROCEDURE — 99285 EMERGENCY DEPT VISIT HI MDM: CPT | Mod: 25

## 2023-07-01 PROCEDURE — 99222 1ST HOSP IP/OBS MODERATE 55: CPT | Mod: AI | Performed by: NURSE PRACTITIONER

## 2023-07-01 PROCEDURE — 90791 PSYCH DIAGNOSTIC EVALUATION: CPT

## 2023-07-01 RX ORDER — VENLAFAXINE HYDROCHLORIDE 75 MG/1
75 CAPSULE, EXTENDED RELEASE ORAL
Status: DISCONTINUED | OUTPATIENT
Start: 2023-07-02 | End: 2023-07-03 | Stop reason: HOSPADM

## 2023-07-01 RX ORDER — OLANZAPINE 2.5 MG/1
2.5 TABLET, FILM COATED ORAL 2 TIMES DAILY
Status: DISCONTINUED | OUTPATIENT
Start: 2023-07-01 | End: 2023-07-01

## 2023-07-01 RX ORDER — TRAZODONE HYDROCHLORIDE 100 MG/1
100 TABLET ORAL AT BEDTIME
Status: DISCONTINUED | OUTPATIENT
Start: 2023-07-01 | End: 2023-07-03 | Stop reason: HOSPADM

## 2023-07-01 RX ORDER — OLANZAPINE 5 MG/1
5 TABLET ORAL ONCE
Status: COMPLETED | OUTPATIENT
Start: 2023-07-01 | End: 2023-07-01

## 2023-07-01 RX ORDER — VENLAFAXINE HYDROCHLORIDE 150 MG/1
150 CAPSULE, EXTENDED RELEASE ORAL
Status: DISCONTINUED | OUTPATIENT
Start: 2023-07-02 | End: 2023-07-03 | Stop reason: HOSPADM

## 2023-07-01 RX ORDER — OLANZAPINE 10 MG/1
10 TABLET ORAL 2 TIMES DAILY
Status: DISCONTINUED | OUTPATIENT
Start: 2023-07-01 | End: 2023-07-03 | Stop reason: HOSPADM

## 2023-07-01 RX ORDER — OLANZAPINE 5 MG/1
5 TABLET ORAL 2 TIMES DAILY PRN
Status: DISCONTINUED | OUTPATIENT
Start: 2023-07-01 | End: 2023-07-03 | Stop reason: HOSPADM

## 2023-07-01 RX ADMIN — OLANZAPINE 5 MG: 5 TABLET, FILM COATED ORAL at 19:14

## 2023-07-01 RX ADMIN — OLANZAPINE 10 MG: 10 TABLET, FILM COATED ORAL at 22:29

## 2023-07-01 RX ADMIN — OLANZAPINE 2.5 MG: 2.5 TABLET, FILM COATED ORAL at 13:03

## 2023-07-01 RX ADMIN — OLANZAPINE 5 MG: 5 TABLET, FILM COATED ORAL at 14:12

## 2023-07-01 RX ADMIN — TRAZODONE HYDROCHLORIDE 100 MG: 100 TABLET ORAL at 22:29

## 2023-07-01 ASSESSMENT — ACTIVITIES OF DAILY LIVING (ADL)
ADLS_ACUITY_SCORE: 35

## 2023-07-01 ASSESSMENT — COLUMBIA-SUICIDE SEVERITY RATING SCALE - C-SSRS
1. HAVE YOU WISHED YOU WERE DEAD OR WISHED YOU COULD GO TO SLEEP AND NOT WAKE UP?: NO
2. HAVE YOU ACTUALLY HAD ANY THOUGHTS OF KILLING YOURSELF?: NO

## 2023-07-01 NOTE — PROGRESS NOTES
EmPATH Group Progress Note    Client Name: Abi Mcnair  Date: July 1, 2023  Service Type:  Group Therapy  Session Start Time:  10:50  Session End Time: 11:30  Session Length: 40  Attendees: Patient and other group members  Facilitator:@ Katia     Topic:   Mental health Check in and positive plan for the day.    Intervention:    Group process: support, challenge, affirm, psycho-education.     Response:  Patient did participate in group. Behavior in group was engaged. Patient shared how she is feeling, what she wants to accomplish, what she will practice today, what she is worried about, things she is grateful for and things that made her happy today.       Mauri Iglesias, [facility] Center[facility]

## 2023-07-01 NOTE — DISCHARGE INSTRUCTIONS
"  Please follow up with your current psychiatrist at [facility]     Please follow up with your pending therapy intake      You have been referred to the [facility]. Our staff will contact you to schedule. This outpatient service will provide short-term, VIRTUAL or In-Person sessions until you begin scheduled services with your long-term provider(s). If you need to contact the Transition Clinic, please call [phone].      Aftercare Plan     If I am feeling unsafe or I am in a crisis, I will:   Contact my established care providers   Call the National Suicide Prevention Lifeline: 988  Go to the nearest emergency room   Call 911      Warning signs that I or other people might notice when a crisis is developing for me:   Drastic mood changes     Things I am able to do on my own to cope or help me feel better:   Take a walk, go outside      Things that I am able to do with others to cope or help me better:   Music, coloring, shower, call a friend         Changes I can make to support my mental health and wellness:   Continue with the solid sleep hygiene, reach out for help when needed     People in my life that I can ask for help:   My sisters, parents      Your [facility] has a mental health crisis team you can call 24/7: [facility] Mobile Crisis  [phone]                  Crisis Lines  Crisis Text Line  Text 153464  You will be connected with a trained live crisis counselor to provide support.     Por rodanol, texto  LUZ a 523995 o texto a 442-AYUDAME en WhatsApp     The [employer] (LGBTQ Youth Crisis Line)  5.974.277.9150  text START to 061-350        Community Resources  Fast Tracker  Linking people to mental health and substance use disorder resources  fasttrackermn.org      [place] Mental Health Warm Line  Peer to peer support  Monday thru Saturday, 12 pm to 10 pm  353.333.8134 or 1.496.876.2238  Text \"Support\" to 02208     National [place] on Mental Illness (DON)  " 557.504.3074 or 1.888.DON.HELPS        Mental Health Apps  My3  https://Spire Realtypp.org/     VirtualHopeBox  https://SuperSolver.com/apps/virtual-hope-box/        Additional Information  Today you were seen by a licensed mental health professional through Triage and Transition services, [facility] (P)  for a crisis assessment in the Emergency Department at [facility].  It is recommended that you follow up with your established providers (psychiatrist, mental health therapist, and/or primary care doctor - as relevant) as soon as possible. Coordinators from [facility] will be calling you in the next 24-48 hours to ensure that you have the resources you need.  You can also contact [facility] coordinators directly at [phone]. You may have been scheduled for or offered an appointment with a mental health provider. [facility] maintains an extensive network of licensed behavioral health providers to connect patients with the services they need.  We do not charge providers a fee to participate in our referral network.  We match patients with providers based on a patient's specific needs, insurance coverage, and location.  Our first effort will be to refer you to a provider within your care system, and will utilize providers outside your care system as needed.

## 2023-07-01 NOTE — ED TRIAGE NOTES
Pt. States she is having auditory hallucinations that she has had in the past. Pt. States she is on medications but they are not working and is causing her a lot of stress.

## 2023-07-01 NOTE — PROGRESS NOTES
"Pt presented with her Sister today due to auditory hallucinations that have been getting worse over the last week. Voices are not command, \"just loud and getting more intense.\" Denies visual hallucinations. No SI/HI, denies drug or ETOH use, med-complaints, sees a Psychiatrist twice a year. Pt was Inpt here about 5 years ago for her hallucinations, and has had no problems since. Pt recently had a surgical procedure, denies any triggers. Search was complete, her belongings were placed in a locker.   "

## 2023-07-01 NOTE — ED PROVIDER NOTES
History     Chief Complaint:  Hallucinations       HPI   Abi Mcnair is a 53 year old female with past medical history of auditory hallucinations/psychosis who presents with chief complaint auditory hallucinations.  She states that she has had this before, but it was 5 years ago, and they have largely been absent over the last 5 years.  She is maintained on cariprazine and denies any dosage changes or missing any dosages.  She is also on trazodone and venlafaxine.  She denies any medical concerns including fevers/chills, URI symptoms, chest pain, abdominal pain, shortness of breath.  She denies suicidal or homicidal ideation.  She is here with her sister who does not provide any additional history.  She denies drug or alcohol abuse.      Independent Historian:   None - Patient Only    Review of External Notes:   Reviewed nurse triage note from just prior to arrival.       Medications:    cariprazine (VRAYLAR) 3 MG CAPS capsule  CLONAZEPAM PO  traZODone (DESYREL) 50 MG tablet  venlafaxine (EFFEXOR-ER) 150 MG TB24 24 hr tablet  VITAMIN D, CHOLECALCIFEROL, PO        Past Medical History:    Past Medical History:   Diagnosis Date     Allergic rhinitis      Anxiety      Auditory hallucination      CARDIOVASCULAR SCREENING; LDL GOAL LESS THAN 160 5/9/2010     Depression with anxiety      Insomnia 5/8/2009       Past Surgical History:    Past Surgical History:   Procedure Laterality Date     HC EXCIS PRIMARY GANGLION WRIST      right ganglionic cyst removal-wrist        Physical Exam     Patient Vitals for the past 24 hrs:   BP Temp Pulse Resp SpO2   07/01/23 0716 130/74 97.5  F (36.4  C) 66 16 97 %        Physical Exam    Head:  The scalp, face, and head appear normal  Eyes:  Conjunctivae are normal  ENT:    The nose is normal    Pinnae are normal  Neck:  Trachea midline  CV:  Normal rate  Resp:  No respiratory distress   Musc:  Normal muscular tone  Skin:  No rash or lesions noted  Neuro: Speech is normal and  fluent. Face is symmetric. Moving all extremities well.   Psych:  No SI/HI.  Good insight/judgment.  Does not appear to be responding to internal stimuli.  No delusions.        Emergency Department Course       Emergency Department Course & Assessments:      Assessments:  Met & introduced myself to pt @ 8732      Consultations/Discussion of Management or Tests:     The patient arrived in triage where vitals were measured and recorded.   The patient was then escorted back to the emergency department.   The patient's medical records were reviewed.  Nursing notes and vitals were reviewed.    I performed an exam of the patient as documented above. The patient is in agreement with my plan of care.       Social Determinants of Health affecting care:   None    Disposition:  The patient was transferred to [facility].     Impression & Plan        Medical Decision Making:  Patient presents with chief complaint auditory hallucinations.  She does have history in the past, but has been well managed on her medications over the last 5 years.  Uncertain trigger for recurrence over the last week.  Her vital signs are normal.  She denies any medical complaints.  She denies any alcohol or drug use.  She does not appear intoxicated. I believe she is medically cleared and safe for [facility] for further evaluation of her mental health concerns.  She is in agreement with this plan.    Diagnosis:    ICD-10-CM    1. Auditory hallucinations  R44.0                 Paul Kaminski MD  07/01/23 2345

## 2023-07-01 NOTE — PROGRESS NOTES
Pt was given 2.5 mg of zyprexa po @ 1300- she dose not feel it has improved her symptoms, she continues to hear voices at the same intensity. Roxana is working on a puzzle and trying to stay distracted, but does report anxiety. Provider met with here to re-eval.

## 2023-07-01 NOTE — PROGRESS NOTES
"Pt is coloring at this time, she has been keeping busy to distract for the voices she is hearing. She reports no relief form the Zyprexa doses. \"I feel as if I'm losing my mind.\" The voices and \"being confined, is making  me anxious, but I want to be sure the meds are going to work before I go home.\" Writer will report off to Provider and oncoming staff.   "

## 2023-07-01 NOTE — ED PROVIDER NOTES
[facility] Unit - Initial Psychiatric Observation Note  [facility] Emergency Department  Observation Initiation Date: Jul 1, 2023    Abi Mcnair MRN: [id number]   Age: 53 year old YOB: 1970     History     Chief Complaint   Patient presents with     Hallucinations     HPI  HPI   Abi Mcnair is a 53-year-old female with established history of auditory hallucinations, depression and psychosis. She presented to the ED this morning with increased hallucinations in context of recent stressors; surgery/removal of lump in breast and her elderly parent s moving out of her childhood home into assisted living facility. Patient was evaluated by the ED provider, who medically cleared patient to transfer to [facility] for psychiatric assessment, this is reviewed along with all pertinent labs and tests performed.      Per chart review and collaborated by the patient, she has maintained psychiatry stability on her current medication regimen (Vraylar 6mg daily, Effexor 225mg, Trazodone 100mg daily for the past 5 years.) Patient has established outpatient psychiatry services through [facility] and sees her provider twice yearly.     On meeting with the patient today, she reports that in the past two weeks, her hallucinations have suddenly returned following stressors as noted above. She reports these have been stressful and bothersome. She denies any recent medication changes, denies recent substance use and denies medication non-compliance.  She reports auditory hallucinations are her only concerns and are making her anxiety and stress level worse. She reports that in the past week, the hallucinations have affected her functioning and has led to increase agitation, restlessness, and she has had to take a couple days off work. She reports she is eating and drinking normally. Sleep has been fair although not optimal due to hallucinations. She rates current hallucinations at a 10 out of 10 on a  scale with 10 being the worst. She describes them as  loud chatter, laughing, talking, and screaming  She reports she is unable to make out what the voices are saying. She denies the voices are commanding in nature or urging suicidal or homicidal ideations.  She states her mood is fine, denies major issues with sleep, denies increase energy, or any impulsive behaviors. She reports her medication are working fine but believes she needs a  rescue medication  to help with the hallucinations. [facility] reports  feels [facility] in.  Patient was agreeable to starting a low dose Zyprexa to manage the hallucinations. I discussed R/B/A and patient verbalized understanding. Zyprexa was initially started at 2.5 mg with no relief in hallucination, a 5 mg dose was later administered still with no improvement. Patient was agreeable to a trail dose of Zyprexa 10mg BID and receptive to staying on observation status overnight. She is aware that she will be re-assess in the medication and medication changes are likely if hallucinations are not well controlled with Zyprexa. She denies other acute concerns.        Past Medical History  Past Medical History:   Diagnosis Date     Allergic rhinitis     seasonal     Anxiety      Auditory hallucination      CARDIOVASCULAR SCREENING; LDL GOAL LESS THAN 160 5/9/2010     Depression with anxiety      Insomnia 5/8/2009     Past Surgical History:   Procedure Laterality Date     HC EXCIS PRIMARY GANGLION WRIST      right ganglionic cyst removal-wrist     cariprazine (VRAYLAR) 3 MG CAPS capsule  traZODone (DESYREL) 50 MG tablet  venlafaxine (EFFEXOR-ER) 150 MG TB24 24 hr tablet  CLONAZEPAM PO  VITAMIN D, CHOLECALCIFEROL, PO      Allergies   Allergen Reactions     Diphenhydramine Hcl      Throat started to close up     Family History  Family History   Problem Relation Age of Onset     Circulatory Mother         varicose veins     Osteoporosis Mother          osteopenia     Arthritis Father      Lipids Father      Depression Maternal Grandfather      Alcohol/Drug Paternal Grandfather         alcoholism     C.A.D. Other         paternal cousin, heart attack     Diabetes Paternal Uncle      Hypertension Paternal Uncle      Cancer - colorectal Paternal Aunt      Allergies Other         seasonal allergies, benadryl, mold, dust, self ,improves as she gets older     Lipids Sister      Neurologic Disorder Sister         seizures     Respiratory Maternal Uncle         chf, was on oxygen     Thyroid Disease Other         paternal cousin, thyroid cancer     Cancer No family hx of         skin cancer     Social History   Social History     Tobacco Use     Smoking status: Never     Smokeless tobacco: Never     Tobacco comments:     tried it when she was younger   Substance Use Topics     Alcohol use: Yes     Comment: 0-5 per month     Drug use: No      Past medical history, past surgical history, medications, allergies, family history, and social history were reviewed with the patient. No additional pertinent items.       Review of Systems  A complete review of systems was performed with pertinent positives and negatives noted in the HPI, and all other systems negative.    Physical Examination   BP: 130/74  Pulse: 66  Temp: 97.5  F (36.4  C)  Resp: 16  Height:  (5 6)  Weight: 86.2 kg (190 lb)  SpO2: 97 %    Physical Exam  General: Appears stated age.   Neuro: Alert and fully oriented. Extremities appear to demonstrate normal strength on visual inspection.   Integumentary/Skin: no rash visualized, normal color    Psychiatric Examination   Appearance: awake, alert and adequately groomed  Attitude:  cooperative  Eye Contact:  good  Mood:  anxious, sad  and depressed  Affect:  appropriate and in normal range, intensity is blunted and intensity is flat  Speech:  clear, coherent  Psychomotor Behavior:  no evidence of tardive dyskinesia, dystonia, or tics  Thought Process:  logical,  "linear and goal oriented  Associations:  no loose associations  Thought Content:  no evidence of suicidal ideation or homicidal ideation and auditory hallucinations present  Insight:  fair  Judgement:  fair  Oriented to:  time, person, and place  Attention Span and Concentration:  fair  Recent and Remote Memory:  fair  Language: able to name/identify objects without impairment  Fund of Knowledge: intact with awareness of current and past events    ED Course        Labs Ordered and Resulted from Time of ED Arrival to Time of ED Departure - No data to display    Assessments & Plan (with Medical Decision Making)    Patient presenting with worsening auditory hallucinations in the context of recent stressors; recent surgery and elderly parents moving out of her childhood home. Patient has been psychiatrically stable with well control symptoms in the last 5 years taking Vraylar 6mg daily, Effexor 225mg, and Trazodone 100mg daily. She is currently requesting a \"rescue medication\"  to manage the hallucinations. Patient started on a low dose Zyprexa 2.5 mg with no relief, was also tried on Zyprexa 5 mg in addition to previously administered 2.5 mg with no significant  changes in intensity of hallucination. Patient is agreeable to staying on observation  overnight as Zyprexa is been titrated to 10 mg BID. She is aware that another agent might be tried upon reassessment tomorrow if Zyprexa fails to control hallucinations. Nursing notes reviewed noting no acute issues.       I have reviewed the assessment completed by the [facility].     During the observation period, the patient did not require medications for agitation, and did not require restraints/seclusion for patient and/or provider safety.     The patient was found to have a psychiatric condition that would benefit from an observation stay in the emergency department for further psychiatric stabilization and/or coordination of a safe disposition. The observation plan includes " serial assessments of psychiatric condition, potential administration of medications if indicated, further disposition pending the patient's psychiatric course during the monitoring period.     Preliminary diagnosis:    ICD-10-CM    1. Auditory hallucinations  R44.0            Treatment Plan:  --[place] to observation status for further monitoring and medication management.     -Start Zyprexa 10 mg BID to address hallucinations/ augment Vraylar.   -Continue all PTA medication with no changes   -Re-assess in the morning with consideration of a different agent if Zyprexa at 10 mg BID is ineffective in managing hallucinations.        --  NESSA Kaur CNP   [facility] EMERGENCY DEPT  EmPATH Unit     Gabby Manzano APRN CNP  07/01/23 [id number]

## 2023-07-01 NOTE — PLAN OF CARE
Abi Gaby Mcnair  July 1, 2023    Plan of Care Hand-off Note     Patient Care Path: Observation    Plan for Care:   The Pt has has added stressors in the last month and has set up a new therapist appointment to support her mental health. The Pt is interested in adding therapy appts at the [facility] to bridge until her therapy appt on 7/30. Pt has increased sensory issues and some new agitation related to her auditory hallucinations. The pt is close to her siblings and her parents.   The Pt has crisis resources and is hoping for an added medication as a rescue med to reduce voices. Pt will then consult her long term psychiatrist early next week for any other medication changes.     Therapist discussed with provider Gabby Manzano CNP. Gabby is adding Zyprexa and staff will monitor to see if the pt finds relief. At that time the Pt will discharge home with family. Pt's sister may  or if pt is feeling ready she can discontinue in a cab.    Critical Safety Issues: None    Overview:  This patient is a child/adolescent: No    This patient has additional special visitor precautions: No    Legal Status: Voluntary    Contacts:   Renee Mcnair (Sister)   [phone]    Psychiatry Consult:  Gabby Manzano CNP    Updated RN and Consulting Psychiatric Provider regarding plan of care.    JESSIKA Brannon

## 2023-07-01 NOTE — CONSULTS
"Diagnostic Evaluation Consultation  Crisis Assessment    Patient was assessed: In Person  Patient location: declocations: [facility] Emergency Department  Was a release of information signed: Yes. Providers included on the release: NA      Referral Data and Chief Complaint  Abi is a 53 year old, who uses she/her pronouns, and presents to the ED with family/friends. Patient is referred to the ED by self. Patient is presenting to the ED for the following concerns: Auditory Hallucinations.      Informed Consent and Assessment Methods     Patient is her own guardian. Writer met with patient and explained the crisis assessment process, including applicable information disclosures and limits to confidentiality, assessed understanding of the process, and obtained consent to proceed with the assessment. Patient was observed to be able to participate in the assessment as evidenced by Willingness to meet, and engaged in the assessment. Assessment methods included conducting a formal interview with patient, review of medical records, collaboration with medical staff, and obtaining relevant collateral information from family and community providers when available..     Over the course of this crisis assessment provided reassurance, offered validation, engaged patient in problem solving and disposition planning and provided psychoeducation. Patient's response to interventions was relief     Summary of Patient Situation      The Pt is a 53 year old woman with a hx of Bipolar Disorder and anxiety. The Pt has been stable on her current medications for the last 5 years. Pt's last inpatient mental health hospitalization was in 2017.     Pt endorses auditory hallucinations that began on 6/26 presenting as a few songs stuck in her head. The Pt has a history of auditory and visual hallucinations and denies any visual hallucinations at this time. The following day the Pt began to experience \"quiet voices\". These voices became more " "intrusive, interrupting sleep and impacting her functioning at work. By Thursday the voices were so loud and physical symptoms of nausea, and lightheadedness the Pt disclosed to her family members she needed help.         Brief Psychosocial History      The Pt works full time in a [employer] as a  and enjoys her work. The Pt lives alone with her cat in an apartment. Pt is close to her sisters and family, seeing them often. Pt endorses a strong spiritual practice. Pt denies and substance use. Pt denies any legal issues, any financial issues. The Pt reports good sleep hygiene and routine as supportive.     Recent changes in the Pt's life include a breast biopsy and her parents downsizing their home and possessions to move into an assisted living. The Pt has been stable for 5 years, seeing her psychiatrist a few times per year and no medication changes. No current suicidal ideation, homicidal ideation, or suicidal injurious behavior.         Significant Clinical History    The Pt reports her mental health was unstable in her 20's and early 30's. The Pt's most recent inpatient visit was in winter 2017. The pt has a history of Bipolar Disorder, current episode mixed, with psychotic features. Pt has been stable on her current medications for the last 5 years. Pt has been seeing her current psychiatrist for the last four years and has an appointment with a new therapist at [facility] on 7/30/23.         Collateral Information    The following information was received from Renee Mcnair whose relationship to the patient is sister. Information was obtained via phone. Their phone number is [phone] and they last had contact with patient today.     What happened today: \"it sounds like auditory hallucinations but I'm not sure about what. This has happened before in the past.\"       What is different about patient's functioning: the last known experience of this occurrence was five years or longer.     " "    Concern about alcohol/drug use: No     What do you think the patient needs: she takes her mental health very seriously and she's currently on medications. Beyond that, I think she is seeking the treatment that she needs and that's why she's there.       Has patient made comments about wanting to kill themselves/others:  No     If d/c is recommended, can they take part in safety/aftercare planning: Yes sister can and there's another sister who can help with that as well  \"To the best of my knowledge she takes care of her mental health. She has a job she enjoys, and a supportive family.\" She had a medical procedure on the 20th of June (a breast biopsy) and she got through that very well.\"        Risk Assessment    [place] Suicide Severity Rating Scale Full Clinical Version:7/1/23  Suicidal Ideation  1. Wish to be Dead (Lifetime): No  2. Non-Specific Active Suicidal Thoughts (Lifetime): No        Validity of evaluation is impacted by presenting factors during interview auditory hallucinations.   Comments regarding subjective versus objective responses to [place] tool: NA  Environmental or Psychosocial Events: other life stressors and recent life events: Recent breast biopsy. Aging parents are downsizing from family home  Chronic Risk Factors: history of psychiatric hospitalization, chronic and ongoing sleep difficulties and serious, persistent mental illness   Warning Signs: none identified  Protective Factors: strong bond to family unit, community support, or employment, responsibilities and duties to others, including pets and children, lives in a responsibly safe and stable environment, good treatment engagement, sense of importance of health and wellness, able to access care without barriers, supportive ongoing medical and mental health care relationships, help seeking, sense of belonging, sense of self-efficacy and/or positive self-esteem and cultural, spiritual , or Denominational beliefs associated with meaning " and value in life  Interpretation of Risk Scoring, Risk Mitigation Interventions and Safety Plan:  The patient is able to safety plan and has crisis resources       Does the patient have thoughts of harming others? No     Is the patient engaging in sexually inappropriate behavior?  no        Current Substance Abuse     Is there recent substance abuse? no     Was a urine drug screen or blood alcohol level obtained: No       Mental Status Exam     Affect: Labile   Appearance: Appropriate    Attention Span/Concentration: Attentive and Other: Distracted by internal stimuli  Eye Contact: Variable   Fund of Knowledge: Appropriate    Language /Speech Content: Fluent   Language /Speech Volume: Normal    Language /Speech Rate/Productions: Normal    Recent Memory: Intact   Remote Memory: Intact   Mood: Anxious and Irritable    Orientation to Person: Yes    Orientation to Place: Yes   Orientation to Time of Day: Yes    Orientation to Date: Yes    Situation (Do they understand why they are here?): Yes    Psychomotor Behavior: Agitated    Thought Content: Hallucinations   Thought Form: Intact and Other: distracted by internal stimuli      History of commitment: No           Medication    Psychotropic medications: cariprazine (VRAYLAR) 3 MG CAPS capsule  CLONAZEPAM PO  traZODone (DESYREL) 50 MG tablet  venlafaxine (EFFEXOR-ER) 150 MG TB24 24 hr tablet  VITAMIN D, CHOLECALCIFEROL, PO  Medication changes made in the last two weeks: No       Current Care Team    Primary Care Provider: Vickie Stauffer MD    Psychiatrist: Dr. Dorsey @ [facility]  Therapist: No First appt with new therapist on 7/13/23 at [facility]  : No     CTSS or ARMHS: No  ACT Team: No  Other: No      Diagnosis    1. F31. 64  Bipolar Disorder, current episode mixed, with psychotic features.  2. F41. 9 Anxiety, NOS   3. F50. 9 Eating disorder, inactive   4. F60. 3 Personality Disorder with borderline traits.       Clinical Summary and Substantiation of  Recommendations    The Pt has has added stressors in the last month and has set up a new therapist appointment to support her mental health. The Pt is interested in adding therapy appts at the [facility] clinic to bridge until her therapy appt on 7/30. Pt has increased sensory issues and some new agitation related to her auditory hallucinations. The pt is close to her siblings and her parents. The Pt has crisis resources and is hoping for an added medication as a rescue med to reduce voices. Pt will then consult her long term psychiatrist early next week for any other medication changes.   Disposition    Observation while adding zyprexa to stabilize then discharge home    Recommended disposition: Individual Therapy and Medication Management       Reviewed case and recommendations with attending provider. Attending Name: Gabby Manzano CNP     Attending concurs with disposition: Yes       Patient and/or validated legal guardian concurs with disposition: Yes       Final disposition: Individual therapy  and Medication management.       Outpatient Details:  Aftercare plan and appointments placed in the AVS and provided to patient: Completed, will be given to the Pt at time of discharge    Was lethal means counseling provided as a part of aftercare planning? No;       Assessment Details    Patient interview started at: 1015 and completed at: 1115.     Total time spent with the patient or their family: 1.0 hrs      CPT code(s) utilized: 68925 - Psychotherapy for Crisis - 60 (30-74*) min       JESSIKA Brannon,   DEC - Triage & Transition Services  Callback: [phone]     Aftercare Plan  Call your psychiatrist within the next 48 hours to discuss any other medication changes     If I am feeling unsafe or I am in a crisis, I will:   Contact my established care providers   Call the National Suicide Prevention Lifeline: 988  Go to the nearest emergency room   Call 668      Warning signs that I or other people might notice  "when a crisis is developing for me:   Drastic mood changes     Things I am able to do on my own to cope or help me feel better:   Take a walk, go outside      Things that I am able to do with others to cope or help me better:   Music, coloring, shower, call a friend         Changes I can make to support my mental health and wellness:   Continue with the solid sleep hygiene, reach out for help when needed     People in my life that I can ask for help:   My sisters, parents      Your [facility] has a mental health crisis team you can call 24/7: [facility] Mobile Crisis  [phone]                  Crisis Lines  Crisis Text Line  Text 137953  You will be connected with a trained live crisis counselor to provide support.     Por espanol, texto  LUZ a 106125 o texto a 442-AYUDAME en WhatsApp     The [employer] (LGBTQ Youth Crisis Line)  1.893.046.9875  text START to 985-663        Community Resources  Fast Tracker  Linking people to mental health and substance use disorder resources  [employer]      [place] Mental Health Warm Line  Peer to peer support  Monday thru Saturday, 12 pm to 10 pm  817.120.7165 or 3.659.323.6877  Text \"Support\" to 46784     National [place] on Mental Illness (DON)  461.630.5979 or 1.888.DON.HELPS        Mental Health Apps  My3  https://my3app.org/     VirtualHopeBox  https://SpringCM.org/apps/virtual-hope-box/        Additional Information  Today you were seen by a licensed mental health professional through Triage and Transition services, [facility] ([facility])  for a crisis assessment in the Emergency Department at [facility].  It is recommended that you follow up with your established providers (psychiatrist, mental health therapist, and/or primary care doctor - as relevant) as soon as possible. Coordinators from [facility] will be calling you in the next 24-48 hours to ensure that you have the resources you need.  You can also contact [facility] " coordinators directly at [phone]. You may have been scheduled for or offered an appointment with a mental health provider. [facility] maintains an extensive network of licensed behavioral health providers to connect patients with the services they need.  We do not charge providers a fee to participate in our referral network.  We match patients with providers based on a patient's specific needs, insurance coverage, and location.  Our first effort will be to refer you to a provider within your care system, and will utilize providers outside your care system as needed.

## 2023-07-02 PROCEDURE — G0378 HOSPITAL OBSERVATION PER HR: HCPCS

## 2023-07-02 PROCEDURE — 250N000013 HC RX MED GY IP 250 OP 250 PS 637: Performed by: NURSE PRACTITIONER

## 2023-07-02 PROCEDURE — 99232 SBSQ HOSP IP/OBS MODERATE 35: CPT | Performed by: NURSE PRACTITIONER

## 2023-07-02 RX ORDER — QUETIAPINE FUMARATE 50 MG/1
50 TABLET, FILM COATED ORAL 2 TIMES DAILY PRN
Status: DISCONTINUED | OUTPATIENT
Start: 2023-07-02 | End: 2023-07-03 | Stop reason: HOSPADM

## 2023-07-02 RX ADMIN — OLANZAPINE 10 MG: 10 TABLET, FILM COATED ORAL at 07:53

## 2023-07-02 RX ADMIN — VENLAFAXINE HYDROCHLORIDE 75 MG: 75 CAPSULE, EXTENDED RELEASE ORAL at 07:53

## 2023-07-02 RX ADMIN — QUETIAPINE FUMARATE 50 MG: 50 TABLET ORAL at 20:19

## 2023-07-02 RX ADMIN — CARIPRAZINE 6 MG: 6 CAPSULE, GELATIN COATED ORAL at 08:15

## 2023-07-02 RX ADMIN — TRAZODONE HYDROCHLORIDE 100 MG: 100 TABLET ORAL at 21:25

## 2023-07-02 RX ADMIN — QUETIAPINE FUMARATE 50 MG: 50 TABLET ORAL at 18:10

## 2023-07-02 RX ADMIN — VENLAFAXINE HYDROCHLORIDE 150 MG: 150 CAPSULE, EXTENDED RELEASE ORAL at 07:52

## 2023-07-02 RX ADMIN — OLANZAPINE 10 MG: 10 TABLET, FILM COATED ORAL at 21:25

## 2023-07-02 ASSESSMENT — ACTIVITIES OF DAILY LIVING (ADL)
ADLS_ACUITY_SCORE: 35

## 2023-07-02 NOTE — ED NOTES
Patient visible on the unit. Pleasant and cooperative with staff and peers. Eating, drinking and taking medications. After taking 5mg PRN Zyprexa after dinner patient said her auditory hallucinations remained at about 8-9/10. Patient hopes bedtime dose of 10mg Zyprexa will help reduce her auditory hallucinations. Patient willing to try other medications to help with auditory hallucinations if needed.

## 2023-07-02 NOTE — PROGRESS NOTES
EmPATH Group Progress Note    Client Name: Abi Mcnair  Date: July 1, 2023  Service Type:  Group Therapy  Session Start Time:  757pm  Session End Time: 807pm  Session Length: 10  Attendees: Patient and other group members  Facilitator:@     Topic:   Guided meditation    Intervention:    Group process: support, challenge, affirm, psycho-education.     Response:  Patient did participate in group. Behavior in group was appropriate and actively engaged in meditation activities.    MEHRAN ZAPATA

## 2023-07-02 NOTE — PROGRESS NOTES
Pt has met with Provider, she has been staying active on the unit, naps off and on, and has tried to stay distracted from her voices. She is getting more anxious and feeling she wants to be discharged. Will follow up with Provider in re to plan for pt.

## 2023-07-02 NOTE — PROGRESS NOTES
"Pt reported voices \"feel almost worse.\" Describes as loud, intense chatter, non-command. Provider with her at this time.   "

## 2023-07-02 NOTE — ED PROVIDER NOTES
"EmPATH Unit - Psychiatric Consultation  [facility] Emergency Department    Abi Mcnair MRN: [id number]   Age: 53 year old YOB: 1970     History     Chief Complaint   Patient presents with     Hallucinations     HPI  Abi Mcnair is a 53 year old female with history notable for anxiety, depression and psychosis (AH).  She came in complaining of AH - loud chatter with loud \"bass\" music/noise.  She presented to the ED yesterday for AH.  She has received  reports they are worse since she arrived.  She receivied 32.5 mg of Zyprexa tab po since she arrived yesterday beginning at 13:03 (per MAR).  She has had no relief.  She reports feeling very agitated and restless today. She did not appear to be agitated. She rates her depression 0/10, anxiety 7/10, anger 0/10 and SI 0/10 on a scale of 0-10 with 10 being the worst. She did have some brief pauses in conversation before responding to questions.  Discussed holding Trazodone and using Geodon as a prn if able to complete an EKG due to the amount of neuroleptics she had received prn in addition to her scheduled home medication (EHR last EKG 12/2017).  There was a delay in obtaining the EKG.  Patient was agreeable to try Seroquel to treat the worsening AH.  She was willing to stay in observation overnight to try Seroquel to treat her AH and be assessed in the AM for discharge.  She reports her sister will pick her up when she is ready to discharge.     Abi goes to [facility] for psychiatric med mgmt.  Her records are not available in EHR. She reports had been stable on her medication for about 5 year (since her inpatient admission for AH).She started having AH about a week ago.  She has 3 sisters with whom she has a close relationship. Her parents are moving out of her childhood home - which makes her sad but is not stressful.  She recently had a breast biopsy - it was non-malignant. She has a dx of pre-diabetic (managed " w/o medication) and mild seasonal allergies.  She has never been  and has never had children.  She has a cat for a pet. She works as a .     PRNs for AH:   7/1/2023:   13:03 Zyprexa 2.5 mg tab po  14:12 Zyprexa 5 mg tab po  19:13 Zyprexa 5 mg tab po  22:29 Zyprexa 10 mg tab po    7/2/2023:  07:53 Zyprexa 10 mg tab po  18:10 Seroquel 50 mg po  20:19 Seroquel 50 mg po  21:25 Zyprexa 10 mg tab po    Past Medical History  Past Medical History:   Diagnosis Date     Allergic rhinitis     seasonal     Anxiety      Auditory hallucination      CARDIOVASCULAR SCREENING; LDL GOAL LESS THAN 160 5/9/2010     Depression with anxiety      Insomnia 5/8/2009     Past Surgical History:   Procedure Laterality Date     HC EXCIS PRIMARY GANGLION WRIST      right ganglionic cyst removal-wrist     cariprazine (VRAYLAR) 3 MG CAPS capsule  traZODone (DESYREL) 50 MG tablet  venlafaxine (EFFEXOR-ER) 150 MG TB24 24 hr tablet  CLONAZEPAM PO  VITAMIN D, CHOLECALCIFEROL, PO      Allergies   Allergen Reactions     Diphenhydramine Hcl      Throat started to close up     Family History  Family History   Problem Relation Age of Onset     Circulatory Mother         varicose veins     Osteoporosis Mother         osteopenia     Arthritis Father      Lipids Father      Depression Maternal Grandfather      Alcohol/Drug Paternal Grandfather         alcoholism     C.A.D. Other         paternal cousin, heart attack     Diabetes Paternal Uncle      Hypertension Paternal Uncle      Cancer - colorectal Paternal Aunt      Allergies Other         seasonal allergies, benadryl, mold, dust, self ,improves as she gets older     Lipids Sister      Neurologic Disorder Sister         seizures     Respiratory Maternal Uncle         chf, was on oxygen     Thyroid Disease Other         paternal cousin, thyroid cancer     Cancer No family hx of         skin cancer     Social History   Social History     Tobacco Use     Smoking status: Never     Smokeless  "tobacco: Never     Tobacco comments:     tried it when she was younger   Substance Use Topics     Alcohol use: Yes     Comment: 0-5 per month     Drug use: No          Review of Systems  A medically appropriate review of systems was performed with pertinent positives and negatives noted in the HPI, and all other systems negative.    Physical Examination   BP: 130/74  Pulse: 66  Temp: 97.5  F (36.4  C)  Resp: 16  Height:  (5 6)  Weight: 86.2 kg (190 lb)  SpO2: 97 %    Physical Exam  General: Appears stated age.   Neuro: Alert and fully oriented. Extremities appear to demonstrate normal strength on visual inspection.   Integumentary/Skin: no rash visualized, normal color    Psychiatric Examination   Appearance: awake, alert, adequately groomed and casually dressed, overweight  Attitude:  cooperative  Eye Contact:  fair  Mood:  \"agitated\"  Affect:  mood incongruent, did not appear agitated, appeared relaxed and flat  Speech:  clear, coherent and normal prosody  Psychomotor Behavior:  no evidence of tardive dyskinesia, dystonia, or tics  Thought Process:  linear  Associations:  no loose associations  Thought Content:  no evidence of suicidal ideation or homicidal ideation and reporting constant AH of chattering with loud \"base\" in the background  Insight:  fair  Judgement:  fair  Oriented to:  time, person, and place  Attention Span and Concentration:  fair  Recent and Remote Memory:  intact  Language: able to name/identify objects without impairment  Fund of Knowledge: intact with awareness of current and past events    ED Course        Labs Ordered and Resulted from Time of ED Arrival to Time of ED Departure - No data to display    Assessments & Plan (with Medical Decision Making)   Patient presenting with AH that started about a week ago.  She had inpatient admission about 5 years ago for psychosis and has been stable in the outpatient setting taking Effexor, Trazodone and Vraylar. She had received Zyprexa in the ED " yesterday and this morning with minimal benefit. She was reporting worsening of her AH, the voices were louder.  She was agreeable to try Seroquel for treating her AH.  Also discussed Geodon 20 mg po prn for AH.  She was agreeable but an updated EKG was not able to be obtained and therefore was ordered for tomorrow AM. Patient was agreeable to be observed tonight and be re-evaluated for discharge tomorrow. Nursing notes reviewed noting no acute issues.     I have reviewed the assessment completed by the [facility].     Preliminary diagnosis:    ICD-10-CM    1. Auditory hallucinations  R44.0            Treatment Plan:  -1. Continue to observe overnight, reassess in the morning for response to medication and possible discharge.   -2. Start Seroquel 50 mg bid prn for AH  -3. Continue Zyprexa 10 mg bid for AH  -4. Continue Vraylar 6 mg daily for psychosis  -5. Continue Trazodone 100 mg hs for insomnia  -6. Continue Effexor 225 daily for depression and anxiety.  -7. Continue Zyprexa 5 mg bid prn for AH  -8. Obtain EKG in AM to assess for prolonged QTc and further options to treat AH with neuroleptic medications.       --  NESSA Varela CNP   [facility] EMERGENCY DEPT  EmPATH Unit     Nancy Brown APRN CNP  07/03/23 0047

## 2023-07-02 NOTE — PROGRESS NOTES
[facility] Progress Note    Client Name: Abi Mcnair  Date: July 2, 2023  Service Type:  Group Therapy  Session Start Time:  10:00am  Session End Time: 11:00am  Session Length: 60 minutes  Attendees: Patient and other group members  Facilitator: PA and LMHP     Topic:   Coping strategies    Intervention:    Group process: support, challenge, affirm, psycho-education.     Response:  Patient did participate in group. Behavior in group was calm and cooperative. Patient shared she was feeling ok and her goal today was to listen to what she needs.       Lindy Lara, [facility]. [facility] HospitalSW

## 2023-07-02 NOTE — PROGRESS NOTES
"Pt has been up on the unit, having breakfast, received AM meds. She reports sleeping well last night but the voices are \"horrible.\" She reports no relief, the voices are making her anxious. She is pleasant and cooperative, waiting to be reassessed.   "

## 2023-07-02 NOTE — PROGRESS NOTES
"Triage & Transition Services EmPATH     Progress Note    Patient: Abi goes by \"Abi,\" uses she/her pronouns  Date of Service: July 2, 2023  Site of Service:   Patient was seen in-person.     Presenting problem:  Please see initial [facility] Crisis Assessment completed by Rosalinda Payette on 07/01/2023 for complete assessment information. Notable concerns include hallucinations.     Individuals Present: Abi & Corby Hernandez [facility]    Session start: 07:31  Session end: 08:01  Session duration in minutes: 30  [facility] utilized: [id number] - Psychotherapy (with patient) - 30 (16-37*) min        Current Presentation:   The patient reports she is still experiencing AH.  She reports it is louder in nature.  She identifies feeling restless.  She reports the voices are not command in nature.   She rates her anxiety 8/10.  She denies SI/HI.  The patient did get some sleep last night.  She is wanting more relief of her symptoms.   She iss using skills as distraction, coloring and talking to other patients on the unit.       Additional Collateral Information:       Mental Status Exam     Affect: Appropriate   Appearance: Appropriate    Attention Span/Concentration: Attentive  Eye Contact: Engaged   Fund of Knowledge: Appropriate    Language /Speech Content: Fluent   Language /Speech Volume: Normal    Language /Speech Rate/Productions: Normal    Recent Memory: Intact   Remote Memory: Intact   Mood: Anxious    Orientation to Person: Yes    Orientation to Place: Yes   Orientation to Time of Day: Yes    Orientation to Date: Yes    Situation (Do they understand why they are here?): Yes    Psychomotor Behavior: Normal    Thought Content: Hallucinations   Thought Form: Intact     Diagnosis:    Bipolar Disorder, current episode mixed, with psychotic features.  Anxiety, NOS   Eating disorder, inactive   Personality Disorder with borderline traits.    Therapeutic Intervention(s):   Provided active listening, unconditional positive regard, and " validation. Identified and practiced coping skills.    Treatment Objective(s) Addressed:   The focus of this session was on rapport building and identifying and practicing coping strategies .     Progress Towards Goals:   Patient reports  Unchanged  symptoms. Patient is not making progress towards treatment goals as evidenced by as symptoms continue to be significant .     Case Management:      Plan and Clinical Summary and Substantiation of Recommendations:   Observation: Patient to met with psych aprn.  Recommend patient to stay observation again for continued stabilization.     Attending concurs with disposition: Yes         WILLIAN Robles

## 2023-07-03 ENCOUNTER — TELEPHONE (OUTPATIENT)
Dept: BEHAVIORAL HEALTH | Facility: CLINIC | Age: 53
End: 2023-07-03
Payer: COMMERCIAL

## 2023-07-03 VITALS
HEART RATE: 98 BPM | SYSTOLIC BLOOD PRESSURE: 137 MMHG | TEMPERATURE: 98.3 F | RESPIRATION RATE: 16 BRPM | OXYGEN SATURATION: 98 % | BODY MASS INDEX: 28.14 KG/M2 | HEIGHT: 69 IN | DIASTOLIC BLOOD PRESSURE: 91 MMHG | WEIGHT: 190 LBS

## 2023-07-03 PROCEDURE — 250N000013 HC RX MED GY IP 250 OP 250 PS 637: Performed by: NURSE PRACTITIONER

## 2023-07-03 PROCEDURE — 99239 HOSP IP/OBS DSCHRG MGMT >30: CPT | Performed by: NURSE PRACTITIONER

## 2023-07-03 PROCEDURE — G0378 HOSPITAL OBSERVATION PER HR: HCPCS

## 2023-07-03 PROCEDURE — 250N000013 HC RX MED GY IP 250 OP 250 PS 637: Performed by: PSYCHIATRY & NEUROLOGY

## 2023-07-03 RX ORDER — IBUPROFEN 600 MG/1
600 TABLET, FILM COATED ORAL EVERY 6 HOURS PRN
Status: DISCONTINUED | OUTPATIENT
Start: 2023-07-03 | End: 2023-07-03 | Stop reason: HOSPADM

## 2023-07-03 RX ORDER — QUETIAPINE FUMARATE 50 MG/1
50 TABLET, FILM COATED ORAL 2 TIMES DAILY PRN
Qty: 60 TABLET | Refills: 0 | Status: SHIPPED | OUTPATIENT
Start: 2023-07-03

## 2023-07-03 RX ORDER — QUETIAPINE FUMARATE 100 MG/1
100 TABLET, FILM COATED ORAL 2 TIMES DAILY
Qty: 60 TABLET | Refills: 0 | Status: SHIPPED | OUTPATIENT
Start: 2023-07-03

## 2023-07-03 RX ORDER — TRAZODONE HYDROCHLORIDE 50 MG/1
100 TABLET, FILM COATED ORAL AT BEDTIME
COMMUNITY
Start: 2023-07-03

## 2023-07-03 RX ADMIN — IBUPROFEN 600 MG: 600 TABLET ORAL at 01:50

## 2023-07-03 RX ADMIN — OLANZAPINE 10 MG: 10 TABLET, FILM COATED ORAL at 08:19

## 2023-07-03 RX ADMIN — VENLAFAXINE HYDROCHLORIDE 75 MG: 75 CAPSULE, EXTENDED RELEASE ORAL at 08:20

## 2023-07-03 RX ADMIN — CARIPRAZINE 6 MG: 6 CAPSULE, GELATIN COATED ORAL at 08:19

## 2023-07-03 RX ADMIN — VENLAFAXINE HYDROCHLORIDE 150 MG: 150 CAPSULE, EXTENDED RELEASE ORAL at 08:19

## 2023-07-03 ASSESSMENT — COLUMBIA-SUICIDE SEVERITY RATING SCALE - C-SSRS
6. HAVE YOU EVER DONE ANYTHING, STARTED TO DO ANYTHING, OR PREPARED TO DO ANYTHING TO END YOUR LIFE?: NO
ATTEMPT SINCE LAST CONTACT: NO
2. HAVE YOU ACTUALLY HAD ANY THOUGHTS OF KILLING YOURSELF?: NO
SUICIDE, SINCE LAST CONTACT: NO
1. SINCE LAST CONTACT, HAVE YOU WISHED YOU WERE DEAD OR WISHED YOU COULD GO TO SLEEP AND NOT WAKE UP?: NO
TOTAL  NUMBER OF INTERRUPTED ATTEMPTS SINCE LAST CONTACT: NO
TOTAL  NUMBER OF ABORTED OR SELF INTERRUPTED ATTEMPTS SINCE LAST CONTACT: NO

## 2023-07-03 ASSESSMENT — ACTIVITIES OF DAILY LIVING (ADL)
ADLS_ACUITY_SCORE: 35

## 2023-07-03 NOTE — PROGRESS NOTES
EmPATH Group Progress Note    Client Name: Abi Mcnair  Date: July 2, 2023  Service Type:  Group Therapy  Session Start Time:  7:15 PM  Session End Time: 7:45 PM  Session Length: 30 minutes  Attendees: Patient and other group members  Facilitator: JESSIKA Muniz     Topic:   Progressive muscle relaxation    Intervention:    Group process: support, challenge, affirm, psycho-education.     Response:  Patient did not participate in group.        JESSIKA Muniz

## 2023-07-03 NOTE — ED PROVIDER NOTES
"[facility] Unit - Psychiatric Observation Discharge Summary  [facility] Emergency Department  Discharge Date: 7/3/2023    Abi Mcnair MRN: [id number]   Age: 53 year old YOB: 1970     Brief HPI & Initial ED Course     Chief Complaint   Patient presents with     Hallucinations     HPI  Abi Mcnair is a 53 year old female with history notable for schizoaffective disorder, depression, and anxiety. Patient presented to the emergency department for evaluation of auditory hallucinations. Patient was medically evaluated in the emergency department and found to be cleared for transfer to [facility] for further psychiatric assessment.     Here at [facility], patient has been calm and cooperative. She was trialed on olanzapine, titrated upward to 10 mg bid. She has also been utilizing quetiapine prn. On reassessment today, patient reports that she has not found olanzapine to be effective in reducing auditory hallucinations. She has found the most relief from prn quetiapine. We discuss replacing olanzapine with scheduled quetiapine to reduce polypharmacy. Patient in agreement with this plan. She reports waking up around 0600 with more prominent auditory hallucinations, but feels better after taking her morning medications. She describes the auditory hallucinations as chatter in the background, denies any command auditory hallucinations. She denies any suicidal or homicidal ideation. Denies medication side effects. Discussed her recent stressors may have led to an exacerbation of her auditory hallucinations. She has follow-up scheduled with outpatient psychiatrist and will begin outpatient therapy. She is in agreement with discharge today.       Physical Examination   BP: (!) 137/91  Pulse: 98  Temp: 98.3  F (36.8  C)  Resp: 16  Height: 175.3 cm (5' 9\")  Weight: 86.2 kg (190 lb)  SpO2: 98 %    Physical Exam  General: Appears stated age.   Neuro: Alert and fully oriented. Extremities appear to demonstrate normal " strength on visual inspection.   Integumentary/Skin: no rash visualized, normal color    Psychiatric Examination   Appearance: awake, alert, adequately groomed, dressed in hospital scrubs and appeared as age stated  Attitude:  cooperative  Eye Contact:  good  Mood:  better  Affect:  appropriate and in normal range and mood congruent  Speech:  clear, coherent and normal prosody  Psychomotor Behavior:  no evidence of tardive dyskinesia, dystonia, or tics  Thought Process:  linear  Associations:  no loose associations  Thought Content:  no evidence of suicidal ideation or homicidal ideation and auditory hallucinations present; denies command AH. Denies visual hallucinations.   Insight:  fair  Judgement:  intact  Oriented to:  time, person, and place  Attention Span and Concentration:  intact  Recent and Remote Memory:  intact  Language: able to name/identify objects without impairment  Fund of Knowledge: intact with awareness of current and past events    Results        Labs Ordered and Resulted from Time of ED Arrival to Time of ED Departure - No data to display    Observation Course   The patient was found to have a psychiatric condition that would benefit from an observation stay in the emergency department for further psychiatric stabilization and/or coordination of a safe disposition. The plan upon observation admission included serial assessments of psychiatric condition, potential administration of medications if indicated, further disposition pending the patient's psychiatric course during the monitoring period.     Serial assessments of the patient's psychiatric condition were performed. Nursing notes were reviewed. During the observation period, the patient did not require medications for agitation, and did not require restraints/seclusion for patient and/or provider safety.     After a period of working with the treatment team on the EmPATH unit, the patient's mental state improved to allow a safe transition  to outpatient care. After counseling on the diagnosis, work-up, and treatment plan, the patient was discharged. Close follow-up with a psychiatrist and/or therapist was recommended and community psychiatric resources were provided. Patient is to return to the ED if any urgent or potentially life-threatening concerns.     Discharge Diagnoses:   Final diagnoses:   Auditory hallucinations   Schizoaffective disorder, unspecified type (H)       Treatment Plan:  - Discontinue olanzapine due to lack of efficacy  - Will schedule quetiapine 100 mg bid for reduction of auditory hallucinations. Continue quetiapine 50 mg bid prn for breakthrough auditory hallucinations  - Continue Vraylar 6 mg daily for mood stabilization and psychosis  - Patient's symptoms have required 2 antipsychotics to achieve efficacy for auditory hallucinations. Consider transitioning to monotherapy in the outpatient setting.   - Continue venlafaxine  mg daily for depressive symptoms  - Continue trazodone 100 mg at bedtime for insomnia  - EKG was not performed during her stay. Will order outpatient EKG due to being on 2 antipsychotics.  - Patient to follow-up with outpatient psychiatrist. She will be provided with follow-up for individual therapy.   - Patient to discharge home today     At the time of discharge, the patient's acute suicide risk was determined to be low due to the following factors: Reduction in the intensity of mood/anxiety symptoms that preceded the admission, denial of suicidal thoughts, denies feeling helpless or helpless, not currently under the influence of alcohol or illicit substances, denies experiencing command hallucinations, no immediate access to firearms. The patient's acute risk could be higher if noncompliant with their treatment plan, medications, follow-up appointments or using illicit substances or alcohol. Protective factors include: social supports, stable housing    I spent more than 30 minutes on discharge day  activities.    --  Paul Lake CNP  [facility] EMERGENCY DEPT  EmPATH Unit     Paul Lake CNP  07/03/23 1021

## 2023-07-03 NOTE — TELEPHONE ENCOUNTER
First attempt to contact pt.  left a VM with [facility] contact info and encouraged a phone call back to schedule initial therapy appointment. Writer will postpone for tomorrow.    Yaa Rivas  07/03/2023  1034  ----- Message from Sang Bhatti sent at 7/3/2023  9:08 AM CDT -----  Regarding: Referral  Transition Clinic Referral   [place]         Please Check Type of Referral Requested:       __X__THERAPY: The Transition clinic is able to schedule patients without current medical insurance; these patient will be referred to our Social Work Care Coordinator for Medical Insurance              Assistance. We are open for referral for psychotherapy. Patient is referred from:  [facility]      ____MEDICATION:  Referrals for Medication are ONLY accepted from the following areas (select): [facility] - HIGH PRIORITY                                       Suboxone and Opioid Management Referrals are automatically denied. [facility] cannot see patient without active medical insurance.   [facility] Psychiatry cannot accept patient with next level of care scheduled with PCP      GUARDIAN: If your patient is not their own Guardian, please provide the following:    Guardian Name:  Guardian Contact Information (Phone & Email) :  Guardian Address:     FOSTER CARE PROVIDER: If your patient lives at a Licensed Foster Care, please provide the following:    Foster Provider:  Foster Provider Contact Information (Phone & Email):  Foster Provider Address:         Referring Provider Contact Name: Marlena Ford; Phone Number: [phone]    Reason for Transition Clinic Referral: Pt needing support prior to starting with new therapist in the end of July    Next Level of Care Patient Will Be Transitioned To: Therapy with new therapist end of July      What Would Be Helpful from the Transition Clinic: Therapy     Needs: NO    Does Patient Have Access to Technology: Yes    Patient E-mail Address: [email or web address]    Current Patient  Phone Number: [phone];     Clinician Gender Preference (if applicable): NO    Patient location preference: Either works!    Sang Bhatti

## 2023-07-03 NOTE — PLAN OF CARE
Patient agreeable to discharge plan. Discharge instructions reviewed with patient including follow-up care plan. Medications: provided and reviewed. Reviewed safety plan and outpatient resources. Denies SI and HI. All belongings that were brought into the hospital have been returned to patient. Escorted off the unit at 11:25am accompanied by [facility] staff. Discharged to home via car.

## 2023-07-03 NOTE — PROGRESS NOTES
"[facility] Crisis Reassessment      Abi Mcnair was reassessed due to boarding status on [facility]. Pt was first seen on 7/1 by Rosalinda Rae; see the initial assessment note for details.      Patient Presentation    Initial ED presentation details: The Pt is a 53 year old woman with a hx of Bipolar Disorder and anxiety. The Pt has been stable on her current medications for the last 5 years. Pt's last inpatient mental health hospitalization was in 2017.      Pt endorses auditory hallucinations that began on 6/26 presenting as a few songs stuck in her head. The Pt has a history of auditory and visual hallucinations and denies any visual hallucinations at this time. The following day the Pt began to experience \"quiet voices\". These voices became more intrusive, interrupting sleep and impacting her functioning at work. By Thursday the voices were so loud and physical symptoms of nausea, and lightheadedness the Pt disclosed to her family members she needed help.     Current patient presentation: Pt presents in the milieu.  Pt is wearing headphones eating breakfast.  Pt is agreeable to met and requesting discharge.  Pt reports that she feels like Seroquel and Zyprexa have been helpful for her, but she is requesting \"bigger\" doses as the auditory hallucinations are still present.  She reports overall improvement and that her hallucinations have returned to background noises.  Pt denies any command hallucinations.  Pt denies any current safety concerns including suicidal ideation, intent, or planning.  Pt denies any self harm or homicidal ideation.    Changes observed since initial assessment: Improvement.      Risk of Harm    [place] Suicide Severity Rating Scale Full Clinical Version:  7/1/23  Suicidal Ideation  1. Wish to be Dead (Lifetime): No  2. Non-Specific Active Suicidal Thoughts (Lifetime): No    [place] Suicide Severity Rating Scale Since Last Contact: 7/3/2023  Suicidal Ideation (Since Last Contact)  1. Wish to be " Dead (Since Last Contact): No  2. Non-Specific Active Suicidal Thoughts (Since Last Contact): No  Suicidal Behavior (Since Last Contact)  Actual Attempt (Since Last Contact): No  Has subject engaged in non-suicidal self-injurious behavior? (Since Last Contact): No  Interrupted Attempts (Since Last Contact): No  Aborted or Self-Interrupted Attempt (Since Last Contact): No  Preparatory Acts or Behavior (Since Last Contact): No  Suicide (Since Last Contact): No     C-SSRS Risk (Since Last Contact)  Calculated C-SSRS Risk Score (Since Last Contact): No Risk Indicated    Validity of evaluation is not impacted by presenting factors during interview n/a.   Comments regarding subjective versus objective responses to [place] tool: n/a  Environmental or Psychosocial Events: other life stressors and recent life events: Recent breast biopsy. Aging parents are downsizing from family home  Chronic Risk Factors: history of psychiatric hospitalization, chronic and ongoing sleep difficulties and serious, persistent mental illness   Warning Signs: none identified  Protective Factors: strong bond to family unit, community support, or employment, responsibilities and duties to others, including pets and children, lives in a responsibly safe and stable environment, good treatment engagement, sense of importance of health and wellness, able to access care without barriers, supportive ongoing medical and mental health care relationships, help seeking, sense of belonging, sense of self-efficacy and/or positive self-esteem and cultural, spiritual , or Spiritism beliefs associated with meaning and value in life  Interpretation of Risk Scoring, Risk Mitigation Interventions and Safety Plan:  Pt denies any safety concerns at this time her risk is indicated with n/a.    Does the patient have thoughts of harming others? No    Mental Status Exam   Affect: Appropriate   Appearance: Appropriate    Attention Span/Concentration: Attentive?    Eye  Contact: Engaged   Fund of Knowledge: Appropriate    Language /Speech Content: Fluent   Language /Speech Volume: Normal    Language /Speech Rate/Productions: Articulate    Recent Memory: Intact   Remote Memory: Intact   Mood: Normal    Orientation to Person: Yes    Orientation to Place: Yes   Orientation to Time of Day: Yes    Orientation to Date: Yes    Situation (Do they understand why they are here?): Yes    Psychomotor Behavior: Normal    Thought Content: Clear   Thought Form: Intact       Additional Collateral Information   n/a      Therapeutic Intervention  The following therapeutic methodologies were employed when working with the patient: Establishing rapport, Assess dimensions of crisis, Apply solution-focused therapy to address current crisis, Identify additional supports and alternative coping skills and Establish a discharge plan. Patient response to intervention: engaged.    Diagnosis:   1.  Bipolar Disorder, current episode mixed, with psychotic features.  2. Anxiety, NOS   3. Eating disorder, inactive   4. Personality Disorder with borderline traits.    Clinical Substantiation of Recommendations  PT denies any current suicidal ideation, intent or planning.  PT denies any self harm. Pt denies any current homicidal ideation, intent or planning.  PT is able to engage in safety planning. Pt appears future and goal oriented.  PT is able to engage in a discussion about their protective factors.  PT does not currently appear to be in need of acute stabilization for overt psychosis, tre, delusional thinking or paranoia.  PT is appropriate to transition into outpatient community services at this time.     At the time of discharge, the patient's acute suicide risk was determined to be low due to the following factors: Reduction in the intensity of mood/anxiety symptoms that preceded the admission, denial of suicidal thoughts, denies feeling helpless or helpless, not currently under the influence of alcohol or  illicit substances, denies experiencing command hallucinations, no immediate access to firearms. The patient's acute risk could be higher if noncompliant with their treatment plan, medications, follow-up appointments or using illicit substances or alcohol. Protective factors include: social supports, stable housing      Plan:    Disposition  Recommended disposition: Individual Therapy and Medication Management      Reviewed case and recommendations with attending provider. Attending Name: Ricky YANG      Attending concurs with disposition: Yes      Patient and/or verified legal guardian concurs with disposition: Yes      Final disposition: Individual therapy  and Medication management.         Assessment Details  Total duration spent on the patient case in minutes: .25 hrs     CPT code(s) utilized: 53389 - Psychotherapy (with patient) - 30 (16-37*) min       Marlena Ford, [facility], [facility]  Callback: [phone]      Please follow up with your current psychiatrist at [facility]     Please follow up with your pending therapy intake      You have been referred to the [facility] Transition [facility]. Our staff will contact you to schedule. This outpatient service will provide short-term, VIRTUAL or In-Person sessions until you begin scheduled services with your long-term provider(s). If you need to contact the Transition Clinic, please call [phone].      Aftercare Plan  Call your psychiatrist within the next 48 hours to discuss any other medication changes     If I am feeling unsafe or I am in a crisis, I will:   Contact my established care providers   Call the National Suicide Prevention Lifeline: 988  Go to the nearest emergency room   Call 051      Warning signs that I or other people might notice when a crisis is developing for me:   Drastic mood changes     Things I am able to do on my own to cope or help me feel better:   Take a walk, go outside      Things that I am able to do with others to cope or help me  "better:   Music, coloring, shower, call a friend         Changes I can make to support my mental health and wellness:   Continue with the solid sleep hygiene, reach out for help when needed     People in my life that I can ask for help:   My sisters, parents      Your [facility] has a mental health crisis team you can call 24/7: [facility] Mobile Crisis  [phone]                  Crisis Lines  Crisis Text Line  Text 510944  You will be connected with a trained live crisis counselor to provide support.     Por espanol, texto  LUZ a 117794 o texto a 442-AYUDAME en WhatsApp     The [employer] (LGBTQ Youth Crisis Line)  8.182.517.4367  text START to 685-002        Community Resources  Fast Tracker  Linking people to mental health and substance use disorder resources  [employer]      [place] Mental Health Warm Line  Peer to peer support  Monday thru Saturday, 12 pm to 10 pm  105.207.8189 or 9.170.804.6408  Text \"Support\" to 06808     National Three Bridges on Mental Illness (DON)  295.148.6911 or 1.888.DON.HELPS        Mental Health Apps  My3  https://myMetronom Healthpp.org/     VirtualHopeBox  https://Open Energi.org/apps/virtual-hope-box/        Additional Information  Today you were seen by a licensed mental health professional through Triage and Transition services, [facility] (P)  for a crisis assessment in the Emergency Department at [facility].  It is recommended that you follow up with your established providers (psychiatrist, mental health therapist, and/or primary care doctor - as relevant) as soon as possible. Coordinators from [facility] will be calling you in the next 24-48 hours to ensure that you have the resources you need.  You can also contact [facility] coordinators directly at [phone]. You may have been scheduled for or offered an appointment with a mental health provider. [facility] maintains an extensive network of licensed behavioral health providers to connect " patients with the services they need.  We do not charge providers a fee to participate in our referral network.  We match patients with providers based on a patient's specific needs, insurance coverage, and location.  Our first effort will be to refer you to a provider within your care system, and will utilize providers outside your care system as needed.

## 2023-07-03 NOTE — PROGRESS NOTES
Pt calm and cooperative. Reports decrease in volume of auditory hallucinations and states that they feel like they are in the back of her head, which is an improvement.

## 2023-07-03 NOTE — ED NOTES
"Patient visible this shift. Pleasant and cooperative with staff and peers. Eating, drinking and taking medications. After trying PRN Seroquel 50mg at 1810 patient said the auditory hallucinations reduced from a 9/10 to 8/10. Patient said her hallucinations today include musical instruments. After trying PRN Seroquel for the first time patient said some of the \"bass sounds\" and \"cymbols\" were reduced. Patient denies suicidal ideation.    Around 2019 patient took her second dose of PRN Seroquel. She is hopeful this second dose will further reduce her auditory hallucinations.    At 2120 patient reported no further relief after taking a second dose of PRN Seroquel 50mg. Patient requested her bedtime medications and went to bed. Tomorrow patient would like to discuss with provider either a higher dose of PRN Seroquel or trying another medication to help with her auditory hallucinations.   "

## 2023-07-04 ENCOUNTER — TELEPHONE (OUTPATIENT)
Dept: BEHAVIORAL HEALTH | Facility: CLINIC | Age: 53
End: 2023-07-04
Payer: COMMERCIAL

## 2023-07-04 NOTE — TELEPHONE ENCOUNTER
Writer spoke with pt and scheduled initial TC therapy appointment on 07/11/2023 @  2:30 pm. Writer sent intake documents via "[employer] Writer will reply to referral source.Tracker completed.    Yaa Rivas  07/04/2023  [phone]    ----- Message from Sang Bhatti sent at 7/3/2023  9:08 AM CDT -----  Regarding: Referral  Transition Clinic Referral   [place]         Please Check Type of Referral Requested:       __X__THERAPY: The Transition clinic is able to schedule patients without current medical insurance; these patient will be referred to our Social Work Care Coordinator for Medical Insurance              Assistance. We are open for referral for psychotherapy. Patient is referred from:  [facility]      ____MEDICATION:  Referrals for Medication are ONLY accepted from the following areas (select): [facility] - HIGH PRIORITY                                       Suboxone and Opioid Management Referrals are automatically denied. TC Psychiatry cannot see patient without active medical insurance.   TC Psychiatry cannot accept patient with next level of care scheduled with PCP      GUARDIAN: If your patient is not their own Guardian, please provide the following:    Guardian Name:  Guardian Contact Information (Phone & Email) :  Guardian Address:     FOSTER CARE PROVIDER: If your patient lives at a Licensed Foster Care, please provide the following:    Foster Provider:  Foster Provider Contact Information (Phone & Email):  Foster Provider Address:         Referring Provider Contact Name: Marlena Ford; Phone Number: [phone]    Reason for Transition Clinic Referral: Pt needing support prior to starting with new therapist in the end of July    Next Level of Care Patient Will Be Transitioned To: Therapy with new therapist end of July      What Would Be Helpful from the Transition Clinic: Therapy     Needs: NO    Does Patient Have Access to Technology: Yes    Patient E-mail Address:  [email or web address]    Current Patient Phone Number: [phone];     Clinician Gender Preference (if applicable): NO    Patient location preference: Either works!    Sang Bhatti

## 2023-07-10 ENCOUNTER — TELEPHONE (OUTPATIENT)
Dept: BEHAVIORAL HEALTH | Facility: CLINIC | Age: 53
End: 2023-07-10
Payer: COMMERCIAL

## 2023-07-10 NOTE — TELEPHONE ENCOUNTER
Philr spoke with patient on que and switched appointment to an in person appointment on 07/13/2023 @ 7:30 am.    Yaa Rivas  07/10/2023  737

## 2023-07-12 ENCOUNTER — OFFICE VISIT (OUTPATIENT)
Dept: BEHAVIORAL HEALTH | Facility: CLINIC | Age: 53
End: 2023-07-12
Payer: COMMERCIAL

## 2023-07-12 DIAGNOSIS — F31.64 BIPOLAR AFFECTIVE DISORDER, MIXED, SEVERE, WITH PSYCHOTIC BEHAVIOR (H): Primary | ICD-10-CM

## 2023-07-12 ASSESSMENT — ANXIETY QUESTIONNAIRES
4. TROUBLE RELAXING: NOT AT ALL
IF YOU CHECKED OFF ANY PROBLEMS ON THIS QUESTIONNAIRE, HOW DIFFICULT HAVE THESE PROBLEMS MADE IT FOR YOU TO DO YOUR WORK, TAKE CARE OF THINGS AT HOME, OR GET ALONG WITH OTHER PEOPLE: SOMEWHAT DIFFICULT
5. BEING SO RESTLESS THAT IT IS HARD TO SIT STILL: MORE THAN HALF THE DAYS
GAD7 TOTAL SCORE: 6
7. FEELING AFRAID AS IF SOMETHING AWFUL MIGHT HAPPEN: NOT AT ALL
6. BECOMING EASILY ANNOYED OR IRRITABLE: NOT AT ALL
GAD7 TOTAL SCORE: 6
1. FEELING NERVOUS, ANXIOUS, OR ON EDGE: NEARLY EVERY DAY
2. NOT BEING ABLE TO STOP OR CONTROL WORRYING: NOT AT ALL
3. WORRYING TOO MUCH ABOUT DIFFERENT THINGS: SEVERAL DAYS

## 2023-07-12 ASSESSMENT — COLUMBIA-SUICIDE SEVERITY RATING SCALE - C-SSRS
2. HAVE YOU ACTUALLY HAD ANY THOUGHTS OF KILLING YOURSELF?: NO
TOTAL  NUMBER OF ABORTED OR SELF INTERRUPTED ATTEMPTS SINCE LAST CONTACT: NO
1. SINCE LAST CONTACT, HAVE YOU WISHED YOU WERE DEAD OR WISHED YOU COULD GO TO SLEEP AND NOT WAKE UP?: NO
TOTAL  NUMBER OF INTERRUPTED ATTEMPTS SINCE LAST CONTACT: NO
ATTEMPT SINCE LAST CONTACT: NO
6. HAVE YOU EVER DONE ANYTHING, STARTED TO DO ANYTHING, OR PREPARED TO DO ANYTHING TO END YOUR LIFE?: NO
SUICIDE, SINCE LAST CONTACT: NO

## 2023-07-12 ASSESSMENT — PATIENT HEALTH QUESTIONNAIRE - PHQ9: SUM OF ALL RESPONSES TO PHQ QUESTIONS 1-9: 1

## 2023-07-12 NOTE — PROGRESS NOTES
Transition Clinic Progress Note   Discharge Summary    Discharge Summary Date:  July 14, 2023  Single Session    Client Name:  Abi Mcnair MRN#: [id number] YOB: 1970    Service Type: Individual     Visit Start Time: 0732 Visit End Time: 0822    Session Length:   TC Session Length: 45 (38-52 Minutes)     Visit #:  1             Attendees:  TC Attendees: Client alone    Service Modality:  Service Modality: In-Person    Informed Consent and Assessment Methods  Patient is under the guardianship of self Writer met with patient and guardian and discussed applicable screens, disclosures and limits to confidentiality including mandated reporting.  Assessed patient, guardian understanding of the process, and obtained guardian consent to proceed with the assessment. Patient was observed to be able to participate in the assessment as evidenced by their orientation to person, place, time, and situation.  Assessment methods included conducting a formal interview with patient, review of medical records, collaboration with medical staff, and obtaining relevant collateral information from family and community providers when available. Patient and guardian understand Transition Clinic services are brief usually until a referral can be made and bridging to long term therapy can occur.      Progress Since Last Session (Related to Symptoms / Goals / Homework):   Symptoms: Improving Pt reported no unwanted sxs since ED visit on 7.08.2023  Homework: N/A initial visit    Patient Presentation:  Pt is 53-year-old  female identified individual who was referred to TC for bridging therapy until next LoC is reached (individual therapy) at the end of July 2023. Pt reported a hx of bipolar disorder with AH/VH, which had been stable with medications for the past five years. Beginning on 6.26.2023, pt began to experience AH, which culminated with [facility] sxs severity and severe functional  impairment. Pt remained at [facility] from 7.01-7.03.2023 and discharged once stabilized.     Pt reported added stressors during the past month (health scare and moving aging parents to assisted living) as the only factors she can think of that may have contributed to remittance of . Since discharge from hospital, pt endorsed increased sensory issues and some new agitation upon waking each day. She doesn't believe this has been effecting her ability to function as needed. Pt reported a health support system including her sisters and positive work environment. In addition, pt seems to take her mental health seriously and is help-seeking as needed. She has scheduled a long-term therapy appointment for herself on 7.30.2023, met with long-term psychiatrist last week, has rescue medication should she begin to experience positive (AH/VH) sxs again, and has crisis resources available.     No additional appointments are needed at this time and pt has TC contact information should she need additional support prior to next LoC.        Focus of Treatment Objective(s):  Client's presenting concerns included: initial visit after being in ED for auditory hallucinations; identify supports and appropriate interventions  Stage of Change at time of Discharge: ACTION (Actively working towards change)    Intervention:   Brief Psychotherapy - discussed and prioritizing the most efficient treatment., Motivational Interviewing - helping to find the motivation to make positive behavior changes., Person-Centered Therapy - Actualizes potential and moves towards increased awareness, spontaneity, trust in self and inner directedness. and Problem-Solving Therapy (PST) - Identify specific problems; brainstorming, evaluation, implementing and reviewing solutions.    Medication Adherence:  Yes to include dose changes due to recent     Chemical Use:  NA    The following assessments were completed by patient for this visit:  PHQ9:       11/15/2012      2:30 PM 11/21/2012     4:25 PM 1/8/2013     4:30 PM 9/23/2014     2:40 PM 3/13/2017     3:02 PM 7/12/2023     8:00 AM   PHQ-9 SCORE   PHQ-9 Total Score 17 16 14 3     PHQ-9 Total Score     0 1     GAD7:       11/17/2012     1:57 PM 11/23/2012     1:06 PM 3/13/2017     3:02 PM 7/12/2023     8:00 AM   ALEXIS-7 SCORE   Total Score 21 17     Total Score   8 6     CAGE-AID:       7/12/2023     8:00 AM   CAGE-AID Total Score   Total Score 0     PROMIS 10-Global Health (only subscores and total score):       7/12/2023     8:00 AM   PROMIS-10 Scores Only   Global Mental Health Score 15   Global Physical Health Score 17   PROMIS TOTAL - SUBSCORES 32     [place] Suicide Severity Rating Scale (Short Version)      7/1/2023     7:23 AM 7/1/2023     9:40 AM 7/1/2023     6:00 PM 7/2/2023     8:00 PM 7/3/2023     9:00 AM 7/3/2023    10:09 AM 7/12/2023     8:00 AM   [place]rian Suicide Severity Rating (Short Version)   Over the past 2 weeks have you felt down, depressed, or hopeless? no         Over the past 2 weeks have you had thoughts of killing yourself? no         Have you ever attempted to kill yourself? no         Q1 Wished to be Dead (Past Month)  no    no    Q2 Suicidal Thoughts (Past Month)  no    no    Q3 Suicidal Thought Method  no no no  no    Q4 Suicidal Intent without Specific Plan  no no no  no    Q5 Suicide Intent with Specific Plan  no no no  no    Q6 Suicide Behavior (Lifetime)  no    no    Level of Risk per Screen  low risk    low risk    Interventions  DEC consulted;Monitored via video        1. Wish to be Dead (Since Last Contact)     N  N   2. Non-Specific Active Suicidal Thoughts (Since Last Contact)     N  N   Actual Attempt (Since Last Contact)     N  N   Has subject engaged in non-suicidal self-injurious behavior? (Since Last Contact)     N  N   Interrupted Attempts (Since Last Contact)     N  N   Aborted or Self-Interrupted Attempt (Since Last Contact)     N  N   Preparatory Acts or Behavior (Since Last  Contact)     N  N   Suicide (Since Last Contact)     N  N   Calculated C-SSRS Risk Score (Since Last Contact)     No Risk Indicated  No Risk Indicated        Assessment: Current Emotional / Mental Status (status of significant symptoms):  Risk status (Self / Other harm or suicidal ideation)  Client denies current fears or concerns for personal safety.  Client denies current or recent suicidal ideation or behaviors.  Client denies current or recent homicidal ideation or behaviors.  Client denies current or recent self injurious behavior or ideation.  Client denies other safety concerns.  A safety and risk management plan has not been developed at this time, however client was given the after-hours number should there be a change in any of these risk factors.    Appearance:   Appropriate   Eye Contact:   Good   Psychomotor Behavior: Normal   Attitude:   Cooperative   Orientation:   All  Speech   Rate / Production: Normal    Volume:  Normal   Mood:    Normal  Affect:    Appropriate   Thought Content:  Clear   Thought Form:  Coherent  Logical   Insight:   Good     DSM5 Diagnoses: (Sustained by DSM5 Criteria Listed Above)  Diagnoses: 296.50 (F31.64) Bipolar I Disorder Current or Most Recent Episode Mixed, with psychotic features  Psychosocial & Contextual Factors: 53-year-old  female, living alone, employed fulltime as , close relationships with family (sisters), hx of bipolar dx with auditory hallucinations, well controlled with medications.     Reason for Discharge:  Referred to long-term therapy      Aftercare Plan:  Client will participate in long-term therapy if she chooses and med-management as she has been      Procedure Code: Psychotherapy (with patient) - 45 [CPT 06706]    MARGARETTE Asif, [facility] [place]  July 18, 2023

## 2023-07-14 PROBLEM — F31.64 BIPOLAR AFFECTIVE DISORDER, MIXED, SEVERE, WITH PSYCHOTIC BEHAVIOR (H): Status: ACTIVE | Noted: 2023-07-14

## 2024-05-26 ENCOUNTER — HEALTH MAINTENANCE LETTER (OUTPATIENT)
Age: 54
End: 2024-05-26

## 2025-06-14 ENCOUNTER — HEALTH MAINTENANCE LETTER (OUTPATIENT)
Age: 55
End: 2025-06-14